# Patient Record
Sex: MALE | Race: WHITE | NOT HISPANIC OR LATINO
[De-identification: names, ages, dates, MRNs, and addresses within clinical notes are randomized per-mention and may not be internally consistent; named-entity substitution may affect disease eponyms.]

---

## 2018-02-16 ENCOUNTER — TRANSCRIPTION ENCOUNTER (OUTPATIENT)
Age: 28
End: 2018-02-16

## 2018-09-28 ENCOUNTER — TRANSCRIPTION ENCOUNTER (OUTPATIENT)
Age: 28
End: 2018-09-28

## 2019-04-01 ENCOUNTER — TRANSCRIPTION ENCOUNTER (OUTPATIENT)
Age: 29
End: 2019-04-01

## 2019-08-11 ENCOUNTER — TRANSCRIPTION ENCOUNTER (OUTPATIENT)
Age: 29
End: 2019-08-11

## 2020-01-12 ENCOUNTER — TRANSCRIPTION ENCOUNTER (OUTPATIENT)
Age: 30
End: 2020-01-12

## 2020-03-08 ENCOUNTER — TRANSCRIPTION ENCOUNTER (OUTPATIENT)
Age: 30
End: 2020-03-08

## 2020-03-25 ENCOUNTER — OUTPATIENT (OUTPATIENT)
Dept: OUTPATIENT SERVICES | Facility: HOSPITAL | Age: 30
LOS: 1 days | End: 2020-03-25
Payer: COMMERCIAL

## 2020-03-25 ENCOUNTER — TRANSCRIPTION ENCOUNTER (OUTPATIENT)
Age: 30
End: 2020-03-25

## 2020-03-25 ENCOUNTER — APPOINTMENT (OUTPATIENT)
Dept: CT IMAGING | Facility: CLINIC | Age: 30
End: 2020-03-25
Payer: COMMERCIAL

## 2020-03-25 DIAGNOSIS — S06.0X1A CONCUSSION WITH LOSS OF CONSCIOUSNESS OF 30 MINUTES OR LESS, INITIAL ENCOUNTER: ICD-10-CM

## 2020-03-25 DIAGNOSIS — S09.90XA UNSPECIFIED INJURY OF HEAD, INITIAL ENCOUNTER: ICD-10-CM

## 2020-03-25 PROCEDURE — 70450 CT HEAD/BRAIN W/O DYE: CPT | Mod: 26

## 2020-03-25 PROCEDURE — 70450 CT HEAD/BRAIN W/O DYE: CPT

## 2020-05-12 ENCOUNTER — TRANSCRIPTION ENCOUNTER (OUTPATIENT)
Age: 30
End: 2020-05-12

## 2020-09-27 ENCOUNTER — TRANSCRIPTION ENCOUNTER (OUTPATIENT)
Age: 30
End: 2020-09-27

## 2021-01-05 ENCOUNTER — INPATIENT (INPATIENT)
Facility: HOSPITAL | Age: 31
LOS: 1 days | Discharge: HOME | DRG: 419 | End: 2021-01-07
Attending: SURGERY | Admitting: SURGERY
Payer: COMMERCIAL

## 2021-01-05 ENCOUNTER — TRANSCRIPTION ENCOUNTER (OUTPATIENT)
Age: 31
End: 2021-01-05

## 2021-01-05 VITALS — WEIGHT: 119.93 LBS | HEIGHT: 70 IN

## 2021-01-05 DIAGNOSIS — K81.0 ACUTE CHOLECYSTITIS: ICD-10-CM

## 2021-01-05 DIAGNOSIS — Z87.891 PERSONAL HISTORY OF NICOTINE DEPENDENCE: ICD-10-CM

## 2021-01-05 DIAGNOSIS — F12.90 CANNABIS USE, UNSPECIFIED, UNCOMPLICATED: ICD-10-CM

## 2021-01-05 LAB
ALBUMIN SERPL ELPH-MCNC: 3.9 G/DL — SIGNIFICANT CHANGE UP (ref 3.3–5)
ALP SERPL-CCNC: 56 U/L — SIGNIFICANT CHANGE UP (ref 40–120)
ALT FLD-CCNC: 44 U/L — SIGNIFICANT CHANGE UP (ref 12–78)
ANION GAP SERPL CALC-SCNC: 4 MMOL/L — LOW (ref 5–17)
AST SERPL-CCNC: 26 U/L — SIGNIFICANT CHANGE UP (ref 15–37)
BASOPHILS # BLD AUTO: 0.02 K/UL — SIGNIFICANT CHANGE UP (ref 0–0.2)
BASOPHILS NFR BLD AUTO: 0.2 % — SIGNIFICANT CHANGE UP (ref 0–2)
BILIRUB SERPL-MCNC: 0.4 MG/DL — SIGNIFICANT CHANGE UP (ref 0.2–1.2)
BUN SERPL-MCNC: 12 MG/DL — SIGNIFICANT CHANGE UP (ref 7–23)
CALCIUM SERPL-MCNC: 8.9 MG/DL — SIGNIFICANT CHANGE UP (ref 8.5–10.1)
CHLORIDE SERPL-SCNC: 104 MMOL/L — SIGNIFICANT CHANGE UP (ref 96–108)
CO2 SERPL-SCNC: 31 MMOL/L — SIGNIFICANT CHANGE UP (ref 22–31)
CREAT SERPL-MCNC: 0.99 MG/DL — SIGNIFICANT CHANGE UP (ref 0.5–1.3)
EOSINOPHIL # BLD AUTO: 0.05 K/UL — SIGNIFICANT CHANGE UP (ref 0–0.5)
EOSINOPHIL NFR BLD AUTO: 0.4 % — SIGNIFICANT CHANGE UP (ref 0–6)
GLUCOSE SERPL-MCNC: 103 MG/DL — HIGH (ref 70–99)
HCT VFR BLD CALC: 41 % — SIGNIFICANT CHANGE UP (ref 39–50)
HGB BLD-MCNC: 13.7 G/DL — SIGNIFICANT CHANGE UP (ref 13–17)
IMM GRANULOCYTES NFR BLD AUTO: 0.2 % — SIGNIFICANT CHANGE UP (ref 0–1.5)
LACTATE SERPL-SCNC: 1.1 MMOL/L — SIGNIFICANT CHANGE UP (ref 0.7–2)
LIDOCAIN IGE QN: 167 U/L — SIGNIFICANT CHANGE UP (ref 73–393)
LYMPHOCYTES # BLD AUTO: 1.89 K/UL — SIGNIFICANT CHANGE UP (ref 1–3.3)
LYMPHOCYTES # BLD AUTO: 15.3 % — SIGNIFICANT CHANGE UP (ref 13–44)
MCHC RBC-ENTMCNC: 28.5 PG — SIGNIFICANT CHANGE UP (ref 27–34)
MCHC RBC-ENTMCNC: 33.4 GM/DL — SIGNIFICANT CHANGE UP (ref 32–36)
MCV RBC AUTO: 85.4 FL — SIGNIFICANT CHANGE UP (ref 80–100)
MONOCYTES # BLD AUTO: 1.1 K/UL — HIGH (ref 0–0.9)
MONOCYTES NFR BLD AUTO: 8.9 % — SIGNIFICANT CHANGE UP (ref 2–14)
NEUTROPHILS # BLD AUTO: 9.3 K/UL — HIGH (ref 1.8–7.4)
NEUTROPHILS NFR BLD AUTO: 75 % — SIGNIFICANT CHANGE UP (ref 43–77)
PLATELET # BLD AUTO: 244 K/UL — SIGNIFICANT CHANGE UP (ref 150–400)
POTASSIUM SERPL-MCNC: 3.7 MMOL/L — SIGNIFICANT CHANGE UP (ref 3.5–5.3)
POTASSIUM SERPL-SCNC: 3.7 MMOL/L — SIGNIFICANT CHANGE UP (ref 3.5–5.3)
PROT SERPL-MCNC: 8.1 GM/DL — SIGNIFICANT CHANGE UP (ref 6–8.3)
RBC # BLD: 4.8 M/UL — SIGNIFICANT CHANGE UP (ref 4.2–5.8)
RBC # FLD: 13 % — SIGNIFICANT CHANGE UP (ref 10.3–14.5)
SODIUM SERPL-SCNC: 139 MMOL/L — SIGNIFICANT CHANGE UP (ref 135–145)
WBC # BLD: 12.38 K/UL — HIGH (ref 3.8–10.5)
WBC # FLD AUTO: 12.38 K/UL — HIGH (ref 3.8–10.5)

## 2021-01-05 PROCEDURE — 74177 CT ABD & PELVIS W/CONTRAST: CPT | Mod: 26

## 2021-01-05 PROCEDURE — 99222 1ST HOSP IP/OBS MODERATE 55: CPT

## 2021-01-05 PROCEDURE — 81003 URINALYSIS AUTO W/O SCOPE: CPT

## 2021-01-05 PROCEDURE — 85610 PROTHROMBIN TIME: CPT

## 2021-01-05 PROCEDURE — 80053 COMPREHEN METABOLIC PANEL: CPT

## 2021-01-05 PROCEDURE — 88304 TISSUE EXAM BY PATHOLOGIST: CPT

## 2021-01-05 PROCEDURE — 84100 ASSAY OF PHOSPHORUS: CPT

## 2021-01-05 PROCEDURE — 76705 ECHO EXAM OF ABDOMEN: CPT | Mod: 26

## 2021-01-05 PROCEDURE — 36415 COLL VENOUS BLD VENIPUNCTURE: CPT

## 2021-01-05 PROCEDURE — C1889: CPT

## 2021-01-05 PROCEDURE — C9113: CPT

## 2021-01-05 PROCEDURE — 85730 THROMBOPLASTIN TIME PARTIAL: CPT

## 2021-01-05 PROCEDURE — 82248 BILIRUBIN DIRECT: CPT

## 2021-01-05 PROCEDURE — 93005 ELECTROCARDIOGRAM TRACING: CPT

## 2021-01-05 PROCEDURE — 83690 ASSAY OF LIPASE: CPT

## 2021-01-05 PROCEDURE — 71045 X-RAY EXAM CHEST 1 VIEW: CPT | Mod: 26

## 2021-01-05 PROCEDURE — 86769 SARS-COV-2 COVID-19 ANTIBODY: CPT

## 2021-01-05 PROCEDURE — 83735 ASSAY OF MAGNESIUM: CPT

## 2021-01-05 PROCEDURE — 78226 HEPATOBILIARY SYSTEM IMAGING: CPT

## 2021-01-05 PROCEDURE — 85027 COMPLETE CBC AUTOMATED: CPT

## 2021-01-05 PROCEDURE — A9537: CPT

## 2021-01-05 RX ORDER — ONDANSETRON 8 MG/1
4 TABLET, FILM COATED ORAL ONCE
Refills: 0 | Status: COMPLETED | OUTPATIENT
Start: 2021-01-05 | End: 2021-01-05

## 2021-01-05 RX ORDER — HEPARIN SODIUM 5000 [USP'U]/ML
5000 INJECTION INTRAVENOUS; SUBCUTANEOUS EVERY 8 HOURS
Refills: 0 | Status: DISCONTINUED | OUTPATIENT
Start: 2021-01-05 | End: 2021-01-07

## 2021-01-05 RX ORDER — MORPHINE SULFATE 50 MG/1
4 CAPSULE, EXTENDED RELEASE ORAL ONCE
Refills: 0 | Status: DISCONTINUED | OUTPATIENT
Start: 2021-01-05 | End: 2021-01-05

## 2021-01-05 RX ORDER — PANTOPRAZOLE SODIUM 20 MG/1
40 TABLET, DELAYED RELEASE ORAL DAILY
Refills: 0 | Status: DISCONTINUED | OUTPATIENT
Start: 2021-01-05 | End: 2021-01-07

## 2021-01-05 RX ORDER — HYDROMORPHONE HYDROCHLORIDE 2 MG/ML
0.5 INJECTION INTRAMUSCULAR; INTRAVENOUS; SUBCUTANEOUS EVERY 4 HOURS
Refills: 0 | Status: DISCONTINUED | OUTPATIENT
Start: 2021-01-05 | End: 2021-01-07

## 2021-01-05 RX ORDER — HYDROMORPHONE HYDROCHLORIDE 2 MG/ML
0.5 INJECTION INTRAMUSCULAR; INTRAVENOUS; SUBCUTANEOUS ONCE
Refills: 0 | Status: DISCONTINUED | OUTPATIENT
Start: 2021-01-05 | End: 2021-01-05

## 2021-01-05 RX ORDER — PANTOPRAZOLE SODIUM 20 MG/1
40 TABLET, DELAYED RELEASE ORAL DAILY
Refills: 0 | Status: DISCONTINUED | OUTPATIENT
Start: 2021-01-05 | End: 2021-01-06

## 2021-01-05 RX ORDER — ACETAMINOPHEN 500 MG
650 TABLET ORAL EVERY 6 HOURS
Refills: 0 | Status: DISCONTINUED | OUTPATIENT
Start: 2021-01-05 | End: 2021-01-07

## 2021-01-05 RX ORDER — SODIUM CHLORIDE 9 MG/ML
1000 INJECTION INTRAMUSCULAR; INTRAVENOUS; SUBCUTANEOUS ONCE
Refills: 0 | Status: COMPLETED | OUTPATIENT
Start: 2021-01-05 | End: 2021-01-05

## 2021-01-05 RX ORDER — SODIUM CHLORIDE 9 MG/ML
1000 INJECTION, SOLUTION INTRAVENOUS
Refills: 0 | Status: DISCONTINUED | OUTPATIENT
Start: 2021-01-05 | End: 2021-01-06

## 2021-01-05 RX ORDER — SODIUM CHLORIDE 9 MG/ML
2000 INJECTION INTRAMUSCULAR; INTRAVENOUS; SUBCUTANEOUS ONCE
Refills: 0 | Status: COMPLETED | OUTPATIENT
Start: 2021-01-05 | End: 2021-01-05

## 2021-01-05 RX ORDER — HYDROMORPHONE HYDROCHLORIDE 2 MG/ML
1 INJECTION INTRAMUSCULAR; INTRAVENOUS; SUBCUTANEOUS EVERY 4 HOURS
Refills: 0 | Status: DISCONTINUED | OUTPATIENT
Start: 2021-01-05 | End: 2021-01-07

## 2021-01-05 RX ORDER — HYDROMORPHONE HYDROCHLORIDE 2 MG/ML
1 INJECTION INTRAMUSCULAR; INTRAVENOUS; SUBCUTANEOUS ONCE
Refills: 0 | Status: DISCONTINUED | OUTPATIENT
Start: 2021-01-05 | End: 2021-01-05

## 2021-01-05 RX ORDER — FAMOTIDINE 10 MG/ML
20 INJECTION INTRAVENOUS ONCE
Refills: 0 | Status: COMPLETED | OUTPATIENT
Start: 2021-01-05 | End: 2021-01-05

## 2021-01-05 RX ADMIN — MORPHINE SULFATE 4 MILLIGRAM(S): 50 CAPSULE, EXTENDED RELEASE ORAL at 20:48

## 2021-01-05 RX ADMIN — MORPHINE SULFATE 4 MILLIGRAM(S): 50 CAPSULE, EXTENDED RELEASE ORAL at 20:18

## 2021-01-05 RX ADMIN — HYDROMORPHONE HYDROCHLORIDE 1 MILLIGRAM(S): 2 INJECTION INTRAMUSCULAR; INTRAVENOUS; SUBCUTANEOUS at 23:17

## 2021-01-05 RX ADMIN — HYDROMORPHONE HYDROCHLORIDE 1 MILLIGRAM(S): 2 INJECTION INTRAMUSCULAR; INTRAVENOUS; SUBCUTANEOUS at 22:47

## 2021-01-05 RX ADMIN — SODIUM CHLORIDE 1000 MILLILITER(S): 9 INJECTION INTRAMUSCULAR; INTRAVENOUS; SUBCUTANEOUS at 23:46

## 2021-01-05 RX ADMIN — MORPHINE SULFATE 4 MILLIGRAM(S): 50 CAPSULE, EXTENDED RELEASE ORAL at 21:37

## 2021-01-05 RX ADMIN — FAMOTIDINE 20 MILLIGRAM(S): 10 INJECTION INTRAVENOUS at 20:18

## 2021-01-05 RX ADMIN — SODIUM CHLORIDE 2000 MILLILITER(S): 9 INJECTION INTRAMUSCULAR; INTRAVENOUS; SUBCUTANEOUS at 20:18

## 2021-01-05 RX ADMIN — SODIUM CHLORIDE 2000 MILLILITER(S): 9 INJECTION INTRAMUSCULAR; INTRAVENOUS; SUBCUTANEOUS at 21:18

## 2021-01-05 RX ADMIN — ONDANSETRON 4 MILLIGRAM(S): 8 TABLET, FILM COATED ORAL at 22:47

## 2021-01-05 RX ADMIN — ONDANSETRON 4 MILLIGRAM(S): 8 TABLET, FILM COATED ORAL at 20:18

## 2021-01-05 RX ADMIN — MORPHINE SULFATE 4 MILLIGRAM(S): 50 CAPSULE, EXTENDED RELEASE ORAL at 22:07

## 2021-01-05 RX ADMIN — SODIUM CHLORIDE 1000 MILLILITER(S): 9 INJECTION INTRAMUSCULAR; INTRAVENOUS; SUBCUTANEOUS at 22:46

## 2021-01-05 NOTE — H&P ADULT - HISTORY OF PRESENT ILLNESS
31yo M w/ hx of esophagitis/ulcer (2019), ADHD and anxiety presents with abdominal pain that started 2 days ago. Patient does not relate the pain to any inciting factor, and reports that it was random and sudden. First time for this much abdominal pain. Patient describes pain as sharp stabbing pain mostly at epigastric area to RUQ. Pain used to be intermittent, but today has been constant and severe, rating 10/10. Had nausea and vomited today. Passing flatus/BM. Patient initially went to urgent care and his GI physician (Dr. Snyder) and was told to come to ED. Denies fever/chills, shortness of breath, chest pain

## 2021-01-05 NOTE — ED STATDOCS - OBJECTIVE STATEMENT
Pertinent HPI/PMH/PSH/FHx/SHx and Review of Systems contained within  HPI:  Patient p/w CC epigastric-abd pain, x3 days, new onset. Pt states he had an ulcer x2 years ago. In August, pt had an endoscopy for poor acid production. Has not taken anything for pain; states this abd pain is unlike anything he had experienced before.   PMH/PSH relevant for: stomach ulcer (2019), esophagitis, ADHD, anxiety   ROS negative for: fever, Chest pain, SOB, Nausea, vomiting, diarrhea, dysuria    SocialHx: former smoker and marijuana user (last 1 month ago)  FamilyHx not otherwise contributory

## 2021-01-05 NOTE — H&P ADULT - NSHPPHYSICALEXAM_GEN_ALL_CORE
PHYSICAL EXAM:  GENERAL: NAD, AOx3, well developed, well nourished  HEAD:  Atraumatic, Normocephalic  EYES: EOMI, PERRLA, conjunctiva and sclera clear  ENT: Moist mucous membranes  NECK: Supple, No JVD  CHEST/LUNG: Clear to auscultation bilaterally; No rales, rhonchi, wheezing, or rubs. Unlabored respirations  HEART: Regular rate and rhythm; No murmurs, rubs, or gallops  ABDOMEN: soft, nondistended, moderate-severe tenderness to palpation at epigastric and RUQ area. no rebound tenderness. no peritoneal sign  EXTREMITIES:  2+ Peripheral Pulses, brisk capillary refill. No clubbing, cyanosis, or edema  NERVOUS SYSTEM:  Alert & Oriented X3, speech clear. No deficits   MSK: full ROM, no deformities  SKIN: warm to touch. No rashes or lesions

## 2021-01-05 NOTE — H&P ADULT - NSHPLABSRESULTS_GEN_ALL_CORE
Complete Blood Count + Automated Diff (01.05.21 @ 19:23)   WBC Count: 12.38 K/uL   RBC Count: 4.80 M/uL   Hemoglobin: 13.7 g/dL   Hematocrit: 41.0 %   Mean Cell Volume: 85.4 fl   Mean Cell Hemoglobin: 28.5 pg   Mean Cell Hemoglobin Conc: 33.4 gm/dL   Red Cell Distrib Width: 13.0 %   Platelet Count - Automated: 244 K/uL Comprehensive Metabolic Panel (01.05.21 @ 19:23)   Sodium, Serum: 139 mmol/L   Potassium, Serum: 3.7 mmol/L   Chloride, Serum: 104 mmol/L   Carbon Dioxide, Serum: 31 mmol/L   Anion Gap, Serum: 4 mmol/L   Blood Urea Nitrogen, Serum: 12 mg/dL   Creatinine, Serum: 0.99 mg/dL   Glucose, Serum: 103 mg/dL   Calcium, Total Serum: 8.9 mg/dL   Protein Total, Serum: 8.1 gm/dL   Albumin, Serum: 3.9 g/dL   Bilirubin Total, Serum: 0.4 mg/dL   Alkaline Phosphatase, Serum: 56 U/L   Aspartate Aminotransferase (AST/SGOT): 26 U/L   Alanine Aminotransferase (ALT/SGPT): 44 U/L   eGFR if Non : 102: Interpretative comment Lactate, Blood (01.05.21 @ 19:23)   Lactate, Blood: 1.1 mmol/L Lipase, Serum (01.05.21 @ 19:23)   Lipase, Serum: 167 U/L     < from: US Abdomen Limited (01.05.21 @ 21:55) >    EXAM:  US ABDOMEN LIMITED                            PROCEDURE DATE:  01/05/2021          INTERPRETATION:  CLINICAL INFORMATION: Abdominal pain and nausea    < end of copied text >    < from: US Abdomen Limited (01.05.21 @ 21:55) >    IMPRESSION:    Distended gallbladder without specific sonographic stigmata of acute cholecystitis. Consider either medicine hepatobiliary exam if clinically warranted.        < end of copied text >    < from: CT Abdomen and Pelvis w/ IV Cont (01.05.21 @ 20:05) >      EXAM:  CT ABDOMEN AND PELVIS IC                            PROCEDURE DATE:  01/05/2021        < end of copied text >    < from: CT Abdomen and Pelvis w/ IV Cont (01.05.21 @ 20:05) >    IMPRESSION:  Distended gallbladder; no CT evidence for gallstone, thickened gallbladder wall or pericholecystic fluid. Gallbladder ultrasound is recommended for further evaluation.    Nonspecific mild periportal edema.    Nonspecific mildly enlarged 1.2 cm left periaortic lymph node.    Small 5 mm subpleural right lower lobe lung nodule; if the patient is in the high risk category (i.e. smoker), follow-up chest CT may be pursued in 12 months to ensure stability.      < end of copied text >

## 2021-01-05 NOTE — H&P ADULT - NSICDXPASTMEDICALHX_GEN_ALL_CORE_FT
PAST MEDICAL HISTORY:  Anxiety     Attention deficit hyperkinetic disorder     H/O esophageal ulcer

## 2021-01-05 NOTE — H&P ADULT - ASSESSMENT
31yo M presents severe abdominal pain, US demonstrated cholelithiasis w/ distended gallbladder, but no signs of acute cholecystitis    Plan:  - pain control prn  - monitor VS  - diet: NPO after midnight  - antiemetic control prn  - HIDA in AM  - continue IVF  - continue IV ABx  - DVT/GI PPX  - encourage IS/OOB    Plan discussed with surgery team and attending, Dr. Aragon

## 2021-01-05 NOTE — ED STATDOCS - PHYSICAL EXAMINATION
*GEN: No acute distress, well appearing   *HEAD: Normocephalic, Atraumatic  *EYES/NOSE: b/l Pupils symmetric & Reactive to ligth, EOMI b/l  *THROAT: airway patent, moist mucous membranes  *NECK: Neck supple  *PULMONARY: No Respiratory distress, symmetric b/l chest rise  *CARDIAC: s1s2, regular rhythm   *ABDOMEN: +significant tenderness to upper abd, Non Distended, soft, no guarding, no rebound, no masses   *BACK: no CVA tenderness, No midline vertebral tenderness to palpation   *EXTREMITIES: symmetric pulses, 2+ DP & radial pulses, no cyanosis, no edema   *SKIN: no rash, no bruising   *NEUROLOGIC: alert,  full active & passive ROM in all 4 extremities,   *PSYCH: appropriate concern about symptoms, pleasant

## 2021-01-05 NOTE — ED STATDOCS - NS ED ROS FT
Review of Systems:  	•	CONSTITUTIONAL: no fever  	•	SKIN: no rash  	•	RESPIRATORY: no shortness of breath  	•	CARDIAC: no chest pain  	•	GI:  +abd pain, no nausea, no vomiting, no diarrhea  	•	GENITO-URINARY:  no dysuria  	•	MUSCULOSKELETAL:  no back pain  	•	NEUROLOGIC: no weakness  	•	ALLERGY: no rhinorrhea  	•	PSYSCHIATRIC: appropriate concern about symptoms

## 2021-01-05 NOTE — ED ADULT TRIAGE NOTE - CHIEF COMPLAINT QUOTE
Patient presents to ED complaining of abdominal pain with associated fever x 3-4 days. Pt also endorses nausea and vomiting.

## 2021-01-05 NOTE — ED STATDOCS - PROGRESS NOTE DETAILS
31 y/o Male with stomach ulcer (2019), esophagitis, ADHD, anxiety presents to ED with c/o epigastric-abd pain, x3 days, new onset. Pt states he had an ulcer x2 years ago. In August, pt had an endoscopy for poor acid production. Has not taken anything for pain; states this abd pain is unlike anything he had experienced before.  Will F?U Labs/ CT/ CXR and re-eval.  Karyna Mathews PA-C Pt reports he is still in pain.  Got relief for short period of time and pain returned.  (+_) Tender epigastrum and RUQ.  CT showed distended gallbladder.  Will get RUQ US.  Give additional pain meds.  No free air evident on CXR.  Lipase,LFTs  WNL.  CBC elevated to 12,000.  Karyna Mathews PA-C Pt reports he is still in pain.  Got relief for short period of time and pain returned.  (+_) Tender epigastrum and RUQ.  CT showed distended gallbladder.  Will get RUQ US.  Give additional pain meds.  No free air evident on CXR.  Lipase, LFTs  WNL.  CBC elevated to 12,000.  Will endorse to [facility] Gomez.  Karyna Mathews PA-C PA: Care received from RUSS Munoz. CT abd/pel suspicious for GB wall thickening, rec sono to r/o acute cholecystitis. SONO done, results pending. Medicated for pain. ~Jamaal Gomez PA-C PA: SONO results show Distended gallbladder without specific sonographic stigmata of acute cholecystitis. ~Jamaal Gomez PA-C   Spoke with surgical resident Dr. Valentin, patient likely needs HIDA. Dr. Valentin will contact Dr. Aragon and get back to me whether to admit or transfer. ~Jamaal Gomez PA-C PA: Patient seen by surg res Dr. Valentin, will admit patient under SERJIO Grimm in am. ~Jamaal Gomez PA-C

## 2021-01-05 NOTE — ED STATDOCS - CLINICAL SUMMARY MEDICAL DECISION MAKING FREE TEXT BOX
Significant upper abd pain, will evaluate for pancreatitis, perforated ulcer, reassess for dispo. Significant upper abd pain, will evaluate for pancreatitis, perforated ulcer, reassess for dispo.      PA: Patient presented with RUQ pain, n/v. CT suspicious for GB wall thickening. SONO results show Distended gallbladder without specific sonographic stigmata of acute cholecystitis. Spoke with surgical resident Dr. Valentin, patient likely needs HIDA. admitted under Dr. Aragon. ~Jamaal Gomez PA-C Significant upper abd pain, will evaluate for pancreatitis, perforated ulcer, cholecystitis reassess for dispo.      PA: Patient presented with RUQ pain, n/v. CT suspicious for GB wall thickening. SONO results show Distended gallbladder without specific sonographic stigmata of acute cholecystitis. Spoke with surgical resident Dr. Valentin, patient likely needs HIDA. admitted under Dr. Aragon. ~Jamaal Gomez PA-C

## 2021-01-05 NOTE — ED STATDOCS - NOTES
Spoke with surgical resident Dr. Valentin, patient likely needs HIDA. Dr. Valentin will contact Dr. Aragon and get back to me whether to admit or transfer. ~Jamaal Gomez PA-C

## 2021-01-05 NOTE — ED STATDOCS - ATTENDING CONTRIBUTION TO CARE
I, Lazaro Kline MD,  performed the initial face to face bedside interview with this patient regarding history of present illness, review of symptoms and relevant past medical, social and family history.  I completed an independent physical examination.  I was the initial provider who evaluated this patient. I have signed out the follow up of any pending tests (i.e. labs, radiological studies) to the ACP.  The ACP will complete the work up, interpret tests, administer medications if necessary and reassess the patient for an appropriate disposition.  If questions arise the ACP is expected to contact me for consultation. In the current work-flow I usually don't get to speak to nor reassess patients for final disposition once I sign the case out to the ACP (Unless otherwise specifically documented by me).

## 2021-01-05 NOTE — ED ADULT NURSE NOTE - NSIMPLEMENTINTERV_GEN_ALL_ED
Implemented All Universal Safety Interventions:  [place] to call system. Call bell, personal items and telephone within reach. Instruct patient to call for assistance. Room bathroom lighting operational. Non-slip footwear when patient is off stretcher. Physically safe environment: no spills, clutter or unnecessary equipment. Stretcher in lowest position, wheels locked, appropriate side rails in place.

## 2021-01-05 NOTE — ED ADULT NURSE NOTE - OBJECTIVE STATEMENT
Patient complains of  epigastric-abd pain, x3 days, new onset. Pt states he had an ulcer x2 years ago. In August, pt had an endoscopy for poor acid production. Has not taken anything for pain; states this abd pain is unlike anything he had experienced before.

## 2021-01-06 ENCOUNTER — RESULT REVIEW (OUTPATIENT)
Age: 31
End: 2021-01-06

## 2021-01-06 DIAGNOSIS — K81.0 ACUTE CHOLECYSTITIS: ICD-10-CM

## 2021-01-06 LAB
ALBUMIN SERPL ELPH-MCNC: 2.7 G/DL — LOW (ref 3.3–5)
ALP SERPL-CCNC: 79 U/L — SIGNIFICANT CHANGE UP (ref 40–120)
ALT FLD-CCNC: 157 U/L — HIGH (ref 12–78)
ANION GAP SERPL CALC-SCNC: 4 MMOL/L — LOW (ref 5–17)
APPEARANCE UR: CLEAR — SIGNIFICANT CHANGE UP
APTT BLD: 29.2 SEC — SIGNIFICANT CHANGE UP (ref 27.5–35.5)
AST SERPL-CCNC: 103 U/L — HIGH (ref 15–37)
BILIRUB SERPL-MCNC: 1.7 MG/DL — HIGH (ref 0.2–1.2)
BILIRUB UR-MCNC: NEGATIVE — SIGNIFICANT CHANGE UP
BUN SERPL-MCNC: 11 MG/DL — SIGNIFICANT CHANGE UP (ref 7–23)
CALCIUM SERPL-MCNC: 8.3 MG/DL — LOW (ref 8.5–10.1)
CHLORIDE SERPL-SCNC: 106 MMOL/L — SIGNIFICANT CHANGE UP (ref 96–108)
CO2 SERPL-SCNC: 27 MMOL/L — SIGNIFICANT CHANGE UP (ref 22–31)
COLOR SPEC: YELLOW — SIGNIFICANT CHANGE UP
CREAT SERPL-MCNC: 0.85 MG/DL — SIGNIFICANT CHANGE UP (ref 0.5–1.3)
DIFF PNL FLD: NEGATIVE — SIGNIFICANT CHANGE UP
GLUCOSE SERPL-MCNC: 102 MG/DL — HIGH (ref 70–99)
GLUCOSE UR QL: NEGATIVE MG/DL — SIGNIFICANT CHANGE UP
HCT VFR BLD CALC: 35.4 % — LOW (ref 39–50)
HGB BLD-MCNC: 12 G/DL — LOW (ref 13–17)
INR BLD: 1.25 RATIO — HIGH (ref 0.88–1.16)
KETONES UR-MCNC: ABNORMAL
LEUKOCYTE ESTERASE UR-ACNC: NEGATIVE — SIGNIFICANT CHANGE UP
LIDOCAIN IGE QN: 60 U/L — LOW (ref 73–393)
MCHC RBC-ENTMCNC: 28.8 PG — SIGNIFICANT CHANGE UP (ref 27–34)
MCHC RBC-ENTMCNC: 33.9 GM/DL — SIGNIFICANT CHANGE UP (ref 32–36)
MCV RBC AUTO: 85.1 FL — SIGNIFICANT CHANGE UP (ref 80–100)
NITRITE UR-MCNC: NEGATIVE — SIGNIFICANT CHANGE UP
PH UR: 8 — SIGNIFICANT CHANGE UP (ref 5–8)
PLATELET # BLD AUTO: 198 K/UL — SIGNIFICANT CHANGE UP (ref 150–400)
POTASSIUM SERPL-MCNC: 4.3 MMOL/L — SIGNIFICANT CHANGE UP (ref 3.5–5.3)
POTASSIUM SERPL-SCNC: 4.3 MMOL/L — SIGNIFICANT CHANGE UP (ref 3.5–5.3)
PROT SERPL-MCNC: 6.6 GM/DL — SIGNIFICANT CHANGE UP (ref 6–8.3)
PROT UR-MCNC: NEGATIVE MG/DL — SIGNIFICANT CHANGE UP
PROTHROM AB SERPL-ACNC: 14.5 SEC — HIGH (ref 10.6–13.6)
RBC # BLD: 4.16 M/UL — LOW (ref 4.2–5.8)
RBC # FLD: 12.9 % — SIGNIFICANT CHANGE UP (ref 10.3–14.5)
SARS-COV-2 RNA SPEC QL NAA+PROBE: SIGNIFICANT CHANGE UP
SODIUM SERPL-SCNC: 137 MMOL/L — SIGNIFICANT CHANGE UP (ref 135–145)
SP GR SPEC: 1.01 — SIGNIFICANT CHANGE UP (ref 1.01–1.02)
UROBILINOGEN FLD QL: NEGATIVE MG/DL — SIGNIFICANT CHANGE UP
WBC # BLD: 10.15 K/UL — SIGNIFICANT CHANGE UP (ref 3.8–10.5)
WBC # FLD AUTO: 10.15 K/UL — SIGNIFICANT CHANGE UP (ref 3.8–10.5)

## 2021-01-06 PROCEDURE — 78226 HEPATOBILIARY SYSTEM IMAGING: CPT | Mod: 26

## 2021-01-06 PROCEDURE — 93010 ELECTROCARDIOGRAM REPORT: CPT

## 2021-01-06 PROCEDURE — 47562 LAPAROSCOPIC CHOLECYSTECTOMY: CPT

## 2021-01-06 PROCEDURE — 88304 TISSUE EXAM BY PATHOLOGIST: CPT | Mod: 26

## 2021-01-06 RX ORDER — MEPERIDINE HYDROCHLORIDE 50 MG/ML
12.5 INJECTION INTRAMUSCULAR; INTRAVENOUS; SUBCUTANEOUS
Refills: 0 | Status: DISCONTINUED | OUTPATIENT
Start: 2021-01-06 | End: 2021-01-06

## 2021-01-06 RX ORDER — ONDANSETRON 8 MG/1
4 TABLET, FILM COATED ORAL ONCE
Refills: 0 | Status: DISCONTINUED | OUTPATIENT
Start: 2021-01-06 | End: 2021-01-06

## 2021-01-06 RX ORDER — KETOROLAC TROMETHAMINE 30 MG/ML
15 SYRINGE (ML) INJECTION EVERY 6 HOURS
Refills: 0 | Status: DISCONTINUED | OUTPATIENT
Start: 2021-01-06 | End: 2021-01-07

## 2021-01-06 RX ORDER — KETOROLAC TROMETHAMINE 30 MG/ML
30 SYRINGE (ML) INJECTION ONCE
Refills: 0 | Status: DISCONTINUED | OUTPATIENT
Start: 2021-01-06 | End: 2021-01-06

## 2021-01-06 RX ORDER — PIPERACILLIN AND TAZOBACTAM 4; .5 G/20ML; G/20ML
3.38 INJECTION, POWDER, LYOPHILIZED, FOR SOLUTION INTRAVENOUS ONCE
Refills: 0 | Status: COMPLETED | OUTPATIENT
Start: 2021-01-06 | End: 2021-01-06

## 2021-01-06 RX ORDER — OXYCODONE HYDROCHLORIDE 5 MG/1
10 TABLET ORAL ONCE
Refills: 0 | Status: DISCONTINUED | OUTPATIENT
Start: 2021-01-06 | End: 2021-01-06

## 2021-01-06 RX ORDER — SODIUM CHLORIDE 9 MG/ML
1000 INJECTION, SOLUTION INTRAVENOUS
Refills: 0 | Status: DISCONTINUED | OUTPATIENT
Start: 2021-01-06 | End: 2021-01-06

## 2021-01-06 RX ORDER — PIPERACILLIN AND TAZOBACTAM 4; .5 G/20ML; G/20ML
3.38 INJECTION, POWDER, LYOPHILIZED, FOR SOLUTION INTRAVENOUS EVERY 8 HOURS
Refills: 0 | Status: DISCONTINUED | OUTPATIENT
Start: 2021-01-06 | End: 2021-01-07

## 2021-01-06 RX ORDER — HYDROMORPHONE HYDROCHLORIDE 2 MG/ML
0.5 INJECTION INTRAMUSCULAR; INTRAVENOUS; SUBCUTANEOUS
Refills: 0 | Status: DISCONTINUED | OUTPATIENT
Start: 2021-01-06 | End: 2021-01-06

## 2021-01-06 RX ORDER — SODIUM CHLORIDE 9 MG/ML
1000 INJECTION INTRAMUSCULAR; INTRAVENOUS; SUBCUTANEOUS
Refills: 0 | Status: DISCONTINUED | OUTPATIENT
Start: 2021-01-06 | End: 2021-01-06

## 2021-01-06 RX ORDER — FENTANYL CITRATE 50 UG/ML
50 INJECTION INTRAVENOUS
Refills: 0 | Status: DISCONTINUED | OUTPATIENT
Start: 2021-01-06 | End: 2021-01-06

## 2021-01-06 RX ORDER — MORPHINE SULFATE 50 MG/1
2 CAPSULE, EXTENDED RELEASE ORAL ONCE
Refills: 0 | Status: DISCONTINUED | OUTPATIENT
Start: 2021-01-06 | End: 2021-01-06

## 2021-01-06 RX ADMIN — SODIUM CHLORIDE 120 MILLILITER(S): 9 INJECTION, SOLUTION INTRAVENOUS at 07:34

## 2021-01-06 RX ADMIN — HEPARIN SODIUM 5000 UNIT(S): 5000 INJECTION INTRAVENOUS; SUBCUTANEOUS at 05:19

## 2021-01-06 RX ADMIN — FENTANYL CITRATE 50 MICROGRAM(S): 50 INJECTION INTRAVENOUS at 21:45

## 2021-01-06 RX ADMIN — HYDROMORPHONE HYDROCHLORIDE 0.5 MILLIGRAM(S): 2 INJECTION INTRAMUSCULAR; INTRAVENOUS; SUBCUTANEOUS at 20:30

## 2021-01-06 RX ADMIN — HEPARIN SODIUM 5000 UNIT(S): 5000 INJECTION INTRAVENOUS; SUBCUTANEOUS at 22:40

## 2021-01-06 RX ADMIN — Medication 30 MILLIGRAM(S): at 03:08

## 2021-01-06 RX ADMIN — PIPERACILLIN AND TAZOBACTAM 25 GRAM(S): 4; .5 INJECTION, POWDER, LYOPHILIZED, FOR SOLUTION INTRAVENOUS at 22:28

## 2021-01-06 RX ADMIN — Medication 650 MILLIGRAM(S): at 12:53

## 2021-01-06 RX ADMIN — HYDROMORPHONE HYDROCHLORIDE 1 MILLIGRAM(S): 2 INJECTION INTRAMUSCULAR; INTRAVENOUS; SUBCUTANEOUS at 07:22

## 2021-01-06 RX ADMIN — HYDROMORPHONE HYDROCHLORIDE 1 MILLIGRAM(S): 2 INJECTION INTRAMUSCULAR; INTRAVENOUS; SUBCUTANEOUS at 14:07

## 2021-01-06 RX ADMIN — SODIUM CHLORIDE 75 MILLILITER(S): 9 INJECTION, SOLUTION INTRAVENOUS at 20:47

## 2021-01-06 RX ADMIN — PIPERACILLIN AND TAZOBACTAM 200 GRAM(S): 4; .5 INJECTION, POWDER, LYOPHILIZED, FOR SOLUTION INTRAVENOUS at 13:03

## 2021-01-06 RX ADMIN — HYDROMORPHONE HYDROCHLORIDE 0.5 MILLIGRAM(S): 2 INJECTION INTRAMUSCULAR; INTRAVENOUS; SUBCUTANEOUS at 00:48

## 2021-01-06 RX ADMIN — HYDROMORPHONE HYDROCHLORIDE 0.5 MILLIGRAM(S): 2 INJECTION INTRAMUSCULAR; INTRAVENOUS; SUBCUTANEOUS at 21:00

## 2021-01-06 RX ADMIN — FENTANYL CITRATE 50 MICROGRAM(S): 50 INJECTION INTRAVENOUS at 21:30

## 2021-01-06 RX ADMIN — Medication 30 MILLIGRAM(S): at 02:51

## 2021-01-06 RX ADMIN — FENTANYL CITRATE 50 MICROGRAM(S): 50 INJECTION INTRAVENOUS at 20:20

## 2021-01-06 RX ADMIN — SODIUM CHLORIDE 120 MILLILITER(S): 9 INJECTION INTRAMUSCULAR; INTRAVENOUS; SUBCUTANEOUS at 13:11

## 2021-01-06 RX ADMIN — MORPHINE SULFATE 2 MILLIGRAM(S): 50 CAPSULE, EXTENDED RELEASE ORAL at 11:15

## 2021-01-06 RX ADMIN — HEPARIN SODIUM 5000 UNIT(S): 5000 INJECTION INTRAVENOUS; SUBCUTANEOUS at 14:53

## 2021-01-06 RX ADMIN — OXYCODONE HYDROCHLORIDE 10 MILLIGRAM(S): 5 TABLET ORAL at 20:45

## 2021-01-06 RX ADMIN — HYDROMORPHONE HYDROCHLORIDE 0.5 MILLIGRAM(S): 2 INJECTION INTRAMUSCULAR; INTRAVENOUS; SUBCUTANEOUS at 20:00

## 2021-01-06 RX ADMIN — HYDROMORPHONE HYDROCHLORIDE 0.5 MILLIGRAM(S): 2 INJECTION INTRAMUSCULAR; INTRAVENOUS; SUBCUTANEOUS at 20:15

## 2021-01-06 RX ADMIN — FENTANYL CITRATE 50 MICROGRAM(S): 50 INJECTION INTRAVENOUS at 20:25

## 2021-01-06 RX ADMIN — Medication 15 MILLIGRAM(S): at 21:10

## 2021-01-06 RX ADMIN — SODIUM CHLORIDE 120 MILLILITER(S): 9 INJECTION, SOLUTION INTRAVENOUS at 02:16

## 2021-01-06 RX ADMIN — PANTOPRAZOLE SODIUM 40 MILLIGRAM(S): 20 TABLET, DELAYED RELEASE ORAL at 07:32

## 2021-01-06 RX ADMIN — Medication 15 MILLIGRAM(S): at 14:51

## 2021-01-06 NOTE — PROGRESS NOTE ADULT - ATTENDING COMMENTS
30 Y old male with acute coleocystitis, HIDA positive, febrile, now becoming tachycardiac  NPO  IV antibiotics  IV hydration   DVt prophylaxis  Added on for lap cholecystectomy possible open  D/W pt and Nursing staff. 30 Y old male with acute coleocystitis, HIDA positive, febrile, now becoming tachycardiac  NPO  IV antibiotics  IV hydration   DVt prophylaxis  Added on for lap cholecystectomy possible open  D/W pt and Nursing staff.  Pt explained the surgical procedures in both medical terminology and in lay terms that the patient understood, laparascopic possible open cholecystectomy. Pt explained in both medical terminology and in lay terms that the patient understood, the benefits and alternatives of surgery including non-operative management. Pt explained at length in both medical terminology and in lay terms that the patient understood, the associated risks of surgery including but not limited to infection, bleeding, nerve/organ injury, postoperative pain, poor wound healing, scar formation both internally and externally, risk of seroma/hematoma/abcess/biloma formation, risk of hernia development, risk of injury to liver and/or biliary tree/ductal system, risk of need for further GI/IR/Surgery intervention as required. Pt explained in both medical terminology and in lay terms that the patient understood, the associated pradeep and post operative risks of cardiac/cns/respiratory insult or injury, risk of death. Pt understood all of the above. All questions were answered. Pt gave informed consent for surgery.

## 2021-01-06 NOTE — PROVIDER CONTACT NOTE (MEDICATION) - ACTION/TREATMENT ORDERED:
Toradol 15mg IVP q6hrs PRN pain ordered. Please change IVF's to NS rate 120ml/hr per [facility]- Surgery.

## 2021-01-06 NOTE — CONSULT NOTE ADULT - ASSESSMENT
Patient was seen this morning  Epigastric and right upper quadrant pain.  Positive HIDA scan  Likely gallbladder disease.  Appropriate to consider patient for surgical evaluation for cholecystectomy.

## 2021-01-06 NOTE — PRE-OP CHECKLIST - SELECT TESTS ORDERED
CBC/CMP/PT/PTT/INR/Type and Screen/EKG/CXR/COVID CBC/CMP/PT/PTT/INR/Type and Screen/Urinalysis/EKG/CXR/COVID

## 2021-01-06 NOTE — BRIEF OPERATIVE NOTE - NSICDXBRIEFPROCEDURE_GEN_ALL_CORE_FT
PROCEDURES:  Laparoscopic cholecystectomy in patient older than 10 years of age 06-Jan-2021 20:08:42  CUAUHTEMOC CARRANZA

## 2021-01-06 NOTE — CONSULT NOTE ADULT - SUBJECTIVE AND OBJECTIVE BOX
Patient is a 30y old  Male who presents with a chief complaint of abdominal pain (06 Jan 2021 07:56)      HPI:  29yo M w/ hx of esophagitis/ulcer (2019), ADHD and anxiety presents with abdominal pain that started 2 days ago. Patient does not relate the pain to any inciting factor, and reports that it was random and sudden. First time for this much abdominal pain. Patient describes pain as sharp stabbing pain mostly at epigastric area to RUQ. Pain used to be intermittent, but today has been constant and severe, rating 10/10. Had nausea and vomited today. Passing flatus/BM. Patient initially went to urgent care and his GI physician (Dr. Snyder) and was told to come to ED. Denies fever/chills, shortness of breath, chest pain (05 Jan 2021 23:49)      Patient states that he had been feeling well up until around this weekend and then started having upper abdominal pain, predominantly right upper quadrant epigastric region that was very sharp and knifelike for him.  It did not change with eating but he did have nauseousness with it.  Denies any anti-inflammatories.  Does relate a history of gastric ulcer in the remote past.  Had seen his primary gastroenterologist and sent to the hospital    PAST MEDICAL & SURGICAL HISTORY:  Anxiety    Attention deficit hyperkinetic disorder    H/O esophageal ulcer    No significant past surgical history        MEDICATIONS  (STANDING):  heparin   Injectable 5000 Unit(s) SubCutaneous every 8 hours  pantoprazole  Injectable 40 milliGRAM(s) IV Push daily  piperacillin/tazobactam IVPB.. 3.375 Gram(s) IV Intermittent every 8 hours  sodium chloride 0.9%. 1000 milliLiter(s) (120 mL/Hr) IV Continuous <Continuous>    MEDICATIONS  (PRN):  acetaminophen   Tablet .. 650 milliGRAM(s) Oral every 6 hours PRN Temp greater or equal to 38C (100.4F), Mild Pain (1 - 3)  HYDROmorphone  Injectable 1 milliGRAM(s) IV Push every 4 hours PRN Severe Pain (7 - 10)  HYDROmorphone  Injectable 0.5 milliGRAM(s) IV Push every 4 hours PRN Moderate Pain (4 - 6)  ketorolac   Injectable 15 milliGRAM(s) IV Push every 6 hours PRN Mild Pain (1 - 3)      Allergies    Prozac (Vomiting)  Red wine allergy (Vomiting)    Intolerances        SOCIAL HISTORY:neg drugs    FAMILY HISTORY:NC      REVIEW OF SYSTEMS:    CONSTITUTIONAL: No weakness, fevers or chills  EYES/ENT: No visual changes;  No vertigo or throat pain   NECK: No pain or stiffness  RESPIRATORY: No cough, wheezing, hemoptysis; No shortness of breath  CARDIOVASCULAR: No chest pain or palpitations  GENITOURINARY: No dysuria, frequency or hematuria  NEUROLOGICAL: No numbness or weakness  SKIN: No itching, burning, rashes, or lesions   All other review of systems is negative unless indicated above.    Vital Signs Last 24 Hrs  T(C): 37.8 (06 Jan 2021 13:53), Max: 39.3 (06 Jan 2021 12:44)  T(F): 100.1 (06 Jan 2021 13:53), Max: 102.8 (06 Jan 2021 12:44)  HR: 76 (06 Jan 2021 16:07) (76 - 105)  BP: 112/58 (06 Jan 2021 16:07) (112/58 - 146/81)  BP(mean): 95 (06 Jan 2021 12:44) (94 - 104)  RR: 20 (06 Jan 2021 16:07) (18 - 28)  SpO2: 99% (06 Jan 2021 16:07) (97% - 100%)    PHYSICAL EXAM:    Constitutional: NAD, well-developed  HEENT: EOMI, throat clear  Neck: No LAD, supple  Respiratory: CTA and P  Cardiovascular: S1 and S2, RRR, no M  Gastrointestinal: BS+, soft, RUQ tend/ND, neg HSM,  Extremities: No peripheral edema, neg clubing, cyanosis  Vascular: 2+ peripheral pulses  Neurological: A/O x 3, no focal deficits  Psychiatric: Normal mood, normal affect  Skin: No rashes    LABS:  CBC Full  -  ( 06 Jan 2021 08:01 )  WBC Count : 10.15 K/uL  RBC Count : 4.16 M/uL  Hemoglobin : 12.0 g/dL  Hematocrit : 35.4 %  Platelet Count - Automated : 198 K/uL  Mean Cell Volume : 85.1 fl  Mean Cell Hemoglobin : 28.8 pg  Mean Cell Hemoglobin Concentration : 33.9 gm/dL  Auto Neutrophil # : x  Auto Lymphocyte # : x  Auto Monocyte # : x  Auto Eosinophil # : x  Auto Basophil # : x  Auto Neutrophil % : x  Auto Lymphocyte % : x  Auto Monocyte % : x  Auto Eosinophil % : x  Auto Basophil % : x    01-06    137  |  106  |  11  ----------------------------<  102<H>  4.3   |  27  |  0.85    Ca    8.3<L>      06 Jan 2021 08:01    TPro  6.6  /  Alb  2.7<L>  /  TBili  1.7<H>  /  DBili  x   /  AST  103<H>  /  ALT  157<H>  /  AlkPhos  79  01-06    PT/INR - ( 06 Jan 2021 08:01 )   PT: 14.5 sec;   INR: 1.25 ratio         PTT - ( 06 Jan 2021 08:01 )  PTT:29.2 sec        RADIOLOGY & ADDITIONAL STUDIES:  < from: NM Hepatobiliary Imaging (01.06.21 @ 12:32) >  EXAM:  NM HEPATOBILIARY IMG                            PROCEDURE DATE:  01/06/2021          INTERPRETATION:  CLINICAL INFORMATION: 30 year-old male with abdominal pain, nausea and distended gallbladder, referred to evaluate for acute cholecystitis.    RADIOPHARMACEUTICAL: 3.2 mCi and 3.0 mCi Tc-99m-Mebrofenin, I.V.; 2 doses    TECHNIQUE: Dynamic imaging of the anterior abdomen was performed for 2 hours following injection of radiotracer. Morphine 2 mg I.V. and a second dose of radiotracer were administered at 1 hour.  Dynamic imaging was continued for 1 hour followed by static images of the abdomen in the anterior, right lateral and right anterior oblique projections.    COMPARISON: No previous hepatobiliary scan for comparison    FINDINGS:There is prompt, homogeneous uptake of radiotracer by the hepatocytes. Activity is first seen in the bowel at 55 minutes. The gallbladder is not visualized at anytime during the study despite administration of Morphine. There is good clearance of activity from the liver at the end of the study.    IMPRESSION:  Abnormal morphine-augmented hepatobiliary scan compatible with acute cholecystitis.      Dr. Aragon was notified of these results by Janny via telephone on 1/6/2020 at 1:15 PM.              VU TRAVIS MD; Attending Nuclear Medicine  This document has been electronically signed. Jan 6 2021  1:23PM    < end of copied text >

## 2021-01-07 ENCOUNTER — TRANSCRIPTION ENCOUNTER (OUTPATIENT)
Age: 31
End: 2021-01-07

## 2021-01-07 VITALS
OXYGEN SATURATION: 100 % | TEMPERATURE: 98 F | DIASTOLIC BLOOD PRESSURE: 84 MMHG | HEART RATE: 93 BPM | SYSTOLIC BLOOD PRESSURE: 132 MMHG | RESPIRATION RATE: 20 BRPM

## 2021-01-07 LAB
ALBUMIN SERPL ELPH-MCNC: 2.7 G/DL — LOW (ref 3.3–5)
ALP SERPL-CCNC: 75 U/L — SIGNIFICANT CHANGE UP (ref 40–120)
ALT FLD-CCNC: 127 U/L — HIGH (ref 12–78)
ANION GAP SERPL CALC-SCNC: 4 MMOL/L — LOW (ref 5–17)
AST SERPL-CCNC: 53 U/L — HIGH (ref 15–37)
BILIRUB DIRECT SERPL-MCNC: 0.2 MG/DL — SIGNIFICANT CHANGE UP (ref 0–0.2)
BILIRUB SERPL-MCNC: 0.5 MG/DL — SIGNIFICANT CHANGE UP (ref 0.2–1.2)
BUN SERPL-MCNC: 10 MG/DL — SIGNIFICANT CHANGE UP (ref 7–23)
CALCIUM SERPL-MCNC: 8.6 MG/DL — SIGNIFICANT CHANGE UP (ref 8.5–10.1)
CHLORIDE SERPL-SCNC: 105 MMOL/L — SIGNIFICANT CHANGE UP (ref 96–108)
CO2 SERPL-SCNC: 29 MMOL/L — SIGNIFICANT CHANGE UP (ref 22–31)
CREAT SERPL-MCNC: 0.92 MG/DL — SIGNIFICANT CHANGE UP (ref 0.5–1.3)
GLUCOSE SERPL-MCNC: 127 MG/DL — HIGH (ref 70–99)
HCT VFR BLD CALC: 34.9 % — LOW (ref 39–50)
HGB BLD-MCNC: 11.5 G/DL — LOW (ref 13–17)
MAGNESIUM SERPL-MCNC: 2.1 MG/DL — SIGNIFICANT CHANGE UP (ref 1.6–2.6)
MCHC RBC-ENTMCNC: 28.5 PG — SIGNIFICANT CHANGE UP (ref 27–34)
MCHC RBC-ENTMCNC: 33 GM/DL — SIGNIFICANT CHANGE UP (ref 32–36)
MCV RBC AUTO: 86.4 FL — SIGNIFICANT CHANGE UP (ref 80–100)
PHOSPHATE SERPL-MCNC: 2.7 MG/DL — SIGNIFICANT CHANGE UP (ref 2.5–4.5)
PLATELET # BLD AUTO: 209 K/UL — SIGNIFICANT CHANGE UP (ref 150–400)
POTASSIUM SERPL-MCNC: 4.2 MMOL/L — SIGNIFICANT CHANGE UP (ref 3.5–5.3)
POTASSIUM SERPL-SCNC: 4.2 MMOL/L — SIGNIFICANT CHANGE UP (ref 3.5–5.3)
PROT SERPL-MCNC: 6.7 GM/DL — SIGNIFICANT CHANGE UP (ref 6–8.3)
RBC # BLD: 4.04 M/UL — LOW (ref 4.2–5.8)
RBC # FLD: 12.7 % — SIGNIFICANT CHANGE UP (ref 10.3–14.5)
SODIUM SERPL-SCNC: 138 MMOL/L — SIGNIFICANT CHANGE UP (ref 135–145)
WBC # BLD: 7.14 K/UL — SIGNIFICANT CHANGE UP (ref 3.8–10.5)
WBC # FLD AUTO: 7.14 K/UL — SIGNIFICANT CHANGE UP (ref 3.8–10.5)

## 2021-01-07 RX ORDER — OXYCODONE AND ACETAMINOPHEN 5; 325 MG/1; MG/1
1 TABLET ORAL EVERY 6 HOURS
Refills: 0 | Status: DISCONTINUED | OUTPATIENT
Start: 2021-01-07 | End: 2021-01-07

## 2021-01-07 RX ORDER — OXYCODONE HYDROCHLORIDE 5 MG/1
1 TABLET ORAL
Qty: 20 | Refills: 0
Start: 2021-01-07 | End: 2021-01-11

## 2021-01-07 RX ORDER — OXYCODONE HYDROCHLORIDE 5 MG/1
5 TABLET ORAL EVERY 6 HOURS
Refills: 0 | Status: DISCONTINUED | OUTPATIENT
Start: 2021-01-07 | End: 2021-01-07

## 2021-01-07 RX ADMIN — HEPARIN SODIUM 5000 UNIT(S): 5000 INJECTION INTRAVENOUS; SUBCUTANEOUS at 06:50

## 2021-01-07 RX ADMIN — OXYCODONE HYDROCHLORIDE 5 MILLIGRAM(S): 5 TABLET ORAL at 08:53

## 2021-01-07 RX ADMIN — HYDROMORPHONE HYDROCHLORIDE 1 MILLIGRAM(S): 2 INJECTION INTRAMUSCULAR; INTRAVENOUS; SUBCUTANEOUS at 01:40

## 2021-01-07 RX ADMIN — Medication 650 MILLIGRAM(S): at 00:23

## 2021-01-07 RX ADMIN — Medication 15 MILLIGRAM(S): at 06:51

## 2021-01-07 RX ADMIN — PIPERACILLIN AND TAZOBACTAM 25 GRAM(S): 4; .5 INJECTION, POWDER, LYOPHILIZED, FOR SOLUTION INTRAVENOUS at 06:46

## 2021-01-07 RX ADMIN — PANTOPRAZOLE SODIUM 40 MILLIGRAM(S): 20 TABLET, DELAYED RELEASE ORAL at 08:53

## 2021-01-07 NOTE — PROGRESS NOTE ADULT - ASSESSMENT
29yo M presents severe abdominal pain, US demonstrated cholelithiasis w/ distended gallbladder, but no signs of acute cholecystitis s/p laparoscopic cholecystectomy POD 1    Plan:  - pain control prn  - monitor VS  - diet: CLD, ADTA   - f/u am labs   - antiemetic control prn  - continue IVF  - continue IV ABx  - DVT/GI PPX  - encourage IS/OOB    Plan discussed with surgery team and attending, Dr. Aragon 
31yo M presents severe abdominal pain, US demonstrated cholelithiasis w/ distended gallbladder, but no signs of acute cholecystitis    Plan:  - pain control prn  - monitor VS  - diet: NPO  - antiemetic control prn  - HIDA in AM  - possible OR for laparoscopic cholecystectomy based on HIDA result  - continue IVF  - continue IV ABx  - DVT/GI PPX  - encourage IS/OOB    Plan discussed with surgery team and attending, Dr. Martinez
SP CCX and advance ditand consider DC      DW Dr Snyder

## 2021-01-07 NOTE — DISCHARGE NOTE PROVIDER - NSDCCPCAREPLAN_GEN_ALL_CORE_FT
PRINCIPAL DISCHARGE DIAGNOSIS  Diagnosis: Cholecystitis, acute  Assessment and Plan of Treatment:

## 2021-01-07 NOTE — DISCHARGE NOTE NURSING/CASE MANAGEMENT/SOCIAL WORK - PATIENT PORTAL LINK FT
You can access the FollowMyHealth Patient Portal offered by [facility] by registering at the following website: [email or web address]. By joining [facility]’s FollowMyHealth portal, you will also be able to view your health information using other applications (apps) compatible with our system.

## 2021-01-07 NOTE — DISCHARGE NOTE PROVIDER - NSDCMRMEDTOKEN_GEN_ALL_CORE_FT
Adderall 15 mg oral tablet: 15 milligram(s) orally once a day  KlonoPIN 1 mg oral tablet: 1 milligram(s) orally , As Needed   Adderall 15 mg oral tablet: 15 milligram(s) orally once a day  KlonoPIN 1 mg oral tablet: 1 milligram(s) orally , As Needed  oxyCODONE 5 mg oral tablet: 1 tab(s) orally every 6 hours, As Needed -for moderate pain MDD:4 tabs

## 2021-01-07 NOTE — PROGRESS NOTE ADULT - SUBJECTIVE AND OBJECTIVE BOX
CC:Patient is a 30y old  Male who presents with a chief complaint of abdominal pain (06 Jan 2021 18:22)      Subjective:  Pt seen and examined at bedside with chaperone. Pt is AAOx3, pt in no acute distress. Patient reports TTP around the incisions. Patient denies fever, chills, chest pain, SOB, abd pain, N/V/D, extremity pain or dysfunction, hemoptysis, hematemesis, hematuria, hematochexia, headache, diplopia, vertigo, dizzyness.     ROS:      Vital Signs Last 24 Hrs  T(C): 36.6 (07 Jan 2021 06:22), Max: 39.3 (06 Jan 2021 12:44)  T(F): 97.8 (07 Jan 2021 06:22), Max: 102.8 (06 Jan 2021 12:44)  HR: 84 (07 Jan 2021 06:22) (68 - 107)  BP: 127/81 (07 Jan 2021 06:22) (112/58 - 150/83)  BP(mean): 95 (06 Jan 2021 12:44) (95 - 95)  RR: 20 (07 Jan 2021 06:22) (12 - 20)  SpO2: 99% (07 Jan 2021 06:22) (95% - 100%)    Labs:                                12.0   10.15 )-----------( 198      ( 06 Jan 2021 08:01 )             35.4     CBC Full  -  ( 06 Jan 2021 08:01 )  WBC Count : 10.15 K/uL  RBC Count : 4.16 M/uL  Hemoglobin : 12.0 g/dL  Hematocrit : 35.4 %  Platelet Count - Automated : 198 K/uL  Mean Cell Volume : 85.1 fl  Mean Cell Hemoglobin : 28.8 pg  Mean Cell Hemoglobin Concentration : 33.9 gm/dL  Auto Neutrophil # : x  Auto Lymphocyte # : x  Auto Monocyte # : x  Auto Eosinophil # : x  Auto Basophil # : x  Auto Neutrophil % : x  Auto Lymphocyte % : x  Auto Monocyte % : x  Auto Eosinophil % : x  Auto Basophil % : x    01-06    137  |  106  |  11  ----------------------------<  102<H>  4.3   |  27  |  0.85    Ca    8.3<L>      06 Jan 2021 08:01    TPro  6.6  /  Alb  2.7<L>  /  TBili  1.7<H>  /  DBili  x   /  AST  103<H>  /  ALT  157<H>  /  AlkPhos  79  01-06    LIVER FUNCTIONS - ( 06 Jan 2021 08:01 )  Alb: 2.7 g/dL / Pro: 6.6 gm/dL / ALK PHOS: 79 U/L / ALT: 157 U/L / AST: 103 U/L / GGT: x           PT/INR - ( 06 Jan 2021 08:01 )   PT: 14.5 sec;   INR: 1.25 ratio         PTT - ( 06 Jan 2021 08:01 )  PTT:29.2 sec      Meds:  acetaminophen   Tablet .. 650 milliGRAM(s) Oral every 6 hours PRN  heparin   Injectable 5000 Unit(s) SubCutaneous every 8 hours  HYDROmorphone  Injectable 1 milliGRAM(s) IV Push every 4 hours PRN  HYDROmorphone  Injectable 0.5 milliGRAM(s) IV Push every 4 hours PRN  ketorolac   Injectable 15 milliGRAM(s) IV Push every 6 hours PRN  pantoprazole  Injectable 40 milliGRAM(s) IV Push daily  piperacillin/tazobactam IVPB.. 3.375 Gram(s) IV Intermittent every 8 hours      Radiology:      Physical exam:  Pt is aaox3  Pt in no acute distress  Psych: normal affect  Resp: CTAB  CVS: S1S2(+)  ABD: bowel sounds (+), soft, non distended, no rebound, no guarding, no rigidity, incisions c/d/i  EXT: no calf tenderness or edema to exam b/l, on VTE prophylaxis  Skin: no adverse skin changes to exam      
SURGERY DAILY PROGRESS NOTE:     Subjective:  Patient seen and examined this AM at bedside. Patient's pain has been consistently severe overnight at epigastric and RUQ area. No evidence of vomiting. Denies fever/chills, shortness of breath, chest pain. VS reviewed    Objective:    MEDICATIONS  (STANDING):  heparin   Injectable 5000 Unit(s) SubCutaneous every 8 hours  lactated ringers. 1000 milliLiter(s) (120 mL/Hr) IV Continuous <Continuous>  pantoprazole  Injectable 40 milliGRAM(s) IV Push daily    MEDICATIONS  (PRN):  acetaminophen   Tablet .. 650 milliGRAM(s) Oral every 6 hours PRN Temp greater or equal to 38C (100.4F), Mild Pain (1 - 3)  HYDROmorphone  Injectable 1 milliGRAM(s) IV Push every 4 hours PRN Severe Pain (7 - 10)  HYDROmorphone  Injectable 0.5 milliGRAM(s) IV Push every 4 hours PRN Moderate Pain (4 - 6)      Vital Signs Last 24 Hrs  T(C): 37.8 (06 Jan 2021 07:51), Max: 37.8 (06 Jan 2021 07:51)  T(F): 100.1 (06 Jan 2021 07:51), Max: 100.1 (06 Jan 2021 07:51)  HR: 104 (06 Jan 2021 07:51) (92 - 104)  BP: 126/72 (06 Jan 2021 07:51) (126/72 - 146/81)  BP(mean): 104 (06 Jan 2021 07:51) (94 - 104)  RR: 24 (06 Jan 2021 07:51) (18 - 28)  SpO2: 97% (06 Jan 2021 07:51) (97% - 100%)      PHYSICAL EXAM   GENERAL: NAD, AOx3, well developed, well nourished  HEAD:  Atraumatic, Normocephalic  EYES: EOMI, PERRLA, conjunctiva and sclera clear  ENT: Moist mucous membranes  NECK: Supple, No JVD  CHEST/LUNG: Clear to auscultation bilaterally; No rales, rhonchi, wheezing, or rubs. Unlabored respirations  HEART: Regular rate and rhythm; No murmurs, rubs, or gallops  ABDOMEN: soft, nondistended, moderate-severe tenderness to palpation at epigastric and RUQ area. no rebound tenderness. no peritoneal sign  EXTREMITIES:  2+ Peripheral Pulses, brisk capillary refill. No clubbing, cyanosis, or edema  NERVOUS SYSTEM:  Alert & Oriented X3, speech clear. No deficits   MSK: full ROM, no deformities  SKIN: warm to touch. No rashes or lesions      I&O's Detail    05 Jan 2021 07:01  -  06 Jan 2021 07:00  --------------------------------------------------------  IN:    Lactated Ringers: 600 mL  Total IN: 600 mL    OUT:    Voided (mL): 600 mL  Total OUT: 600 mL    Total NET: 0 mL      06 Jan 2021 07:01  -  06 Jan 2021 07:56  --------------------------------------------------------  IN:    Lactated Ringers: 120 mL  Total IN: 120 mL    OUT:  Total OUT: 0 mL    Total NET: 120 mL          Daily Height in cm: 177.8 (05 Jan 2021 18:23)    Daily     LABS:                        13.7   12.38 )-----------( 244      ( 05 Jan 2021 19:23 )             41.0     01-05    139  |  104  |  12  ----------------------------<  103<H>  3.7   |  31  |  0.99    Ca    8.9      05 Jan 2021 19:23    TPro  8.1  /  Alb  3.9  /  TBili  0.4  /  DBili  x   /  AST  26  /  ALT  44  /  AlkPhos  56  01-05          RADIOLOGY & ADDITIONAL STUDIES:        
Patient is a 30y old  Male who presents with a chief complaint of abdominal pain (07 Jan 2021 09:00)      HPI:  31yo M w/ hx of esophagitis/ulcer (2019), ADHD and anxiety presents with abdominal pain that started 2 days ago. Patient does not relate the pain to any inciting factor, and reports that it was random and sudden. First time for this much abdominal pain. Patient describes pain as sharp stabbing pain mostly at epigastric area to RUQ. Pain used to be intermittent, but today has been constant and severe, rating 10/10. Had nausea and vomited today. Passing flatus/BM. Patient initially went to urgent care and his GI physician (Dr. Snyder) and was told to come to ED. Denies fever/chills, shortness of breath, chest pain (05 Jan 2021 23:49)    Status post cholecystectomy.  Has some mild incisional pain but better than before      PAST MEDICAL & SURGICAL HISTORY:  Anxiety    Attention deficit hyperkinetic disorder    H/O esophageal ulcer    No significant past surgical history        MEDICATIONS  (STANDING):  heparin   Injectable 5000 Unit(s) SubCutaneous every 8 hours  pantoprazole  Injectable 40 milliGRAM(s) IV Push daily  piperacillin/tazobactam IVPB.. 3.375 Gram(s) IV Intermittent every 8 hours    MEDICATIONS  (PRN):  acetaminophen   Tablet .. 650 milliGRAM(s) Oral every 6 hours PRN Temp greater or equal to 38C (100.4F), Mild Pain (1 - 3)  ketorolac   Injectable 15 milliGRAM(s) IV Push every 6 hours PRN Mild Pain (1 - 3)  oxyCODONE    IR 5 milliGRAM(s) Oral every 6 hours PRN Severe Pain (7 - 10)      Allergies    Prozac (Vomiting)  Red wine allergy (Vomiting)    Intolerances        SOCIAL HISTORY:NC    FAMILY HISTORY:NC      REVIEW OF SYSTEMS:    CONSTITUTIONAL: No weakness, fevers or chills  EYES/ENT: No visual changes;  No vertigo or throat pain   NECK: No pain or stiffness  RESPIRATORY: No cough, wheezing, hemoptysis; No shortness of breath  CARDIOVASCULAR: No chest pain or palpitations  GENITOURINARY: No dysuria, frequency or hematuria  NEUROLOGICAL: No numbness or weakness  SKIN: No itching, burning, rashes, or lesions   All other review of systems is negative unless indicated above.    Vital Signs Last 24 Hrs  T(C): 36.7 (07 Jan 2021 12:34), Max: 37.2 (06 Jan 2021 19:55)  T(F): 98 (07 Jan 2021 12:34), Max: 98.9 (06 Jan 2021 19:55)  HR: 93 (07 Jan 2021 12:34) (68 - 107)  BP: 132/84 (07 Jan 2021 12:34) (126/67 - 150/83)  BP(mean): --  RR: 20 (07 Jan 2021 12:34) (12 - 20)  SpO2: 100% (07 Jan 2021 12:34) (95% - 100%)    PHYSICAL EXAM:    Constitutional: NAD, well-developed  HEENT: EOMI, throat clear  Neck: No LAD, supple  Respiratory: CTA and P  Cardiovascular: S1 and S2, RRR, no M  Gastrointestinal: BS+, soft, mild incisional tend/ND, neg HSM,  Extremities: No peripheral edema, neg clubing, cyanosis  Vascular: 2+ peripheral pulses  Neurological: A/O x 3, no focal deficits  Psychiatric: Normal mood, normal affect  Skin: No rashes    LABS:  CBC Full  -  ( 07 Jan 2021 07:33 )  WBC Count : 7.14 K/uL  RBC Count : 4.04 M/uL  Hemoglobin : 11.5 g/dL  Hematocrit : 34.9 %  Platelet Count - Automated : 209 K/uL  Mean Cell Volume : 86.4 fl  Mean Cell Hemoglobin : 28.5 pg  Mean Cell Hemoglobin Concentration : 33.0 gm/dL  Auto Neutrophil # : x  Auto Lymphocyte # : x  Auto Monocyte # : x  Auto Eosinophil # : x  Auto Basophil # : x  Auto Neutrophil % : x  Auto Lymphocyte % : x  Auto Monocyte % : x  Auto Eosinophil % : x  Auto Basophil % : x    01-07    138  |  105  |  10  ----------------------------<  127<H>  4.2   |  29  |  0.92    Ca    8.6      07 Jan 2021 07:33  Phos  2.7     01-07  Mg     2.1     01-07    TPro  6.7  /  Alb  2.7<L>  /  TBili  0.5  /  DBili  0.2  /  AST  53<H>  /  ALT  127<H>  /  AlkPhos  75  01-07    PT/INR - ( 06 Jan 2021 08:01 )   PT: 14.5 sec;   INR: 1.25 ratio         PTT - ( 06 Jan 2021 08:01 )  PTT:29.2 sec        RADIOLOGY & ADDITIONAL STUDIES:

## 2021-01-07 NOTE — DISCHARGE NOTE PROVIDER - NSDCFUADDINST_GEN_ALL_CORE_FT
Please seek immediate medical attention for fever>100.4, chest pain, shortness of breath, skin/eye color changes, worsening abdominal pain, any adverse changes to health

## 2021-01-07 NOTE — DISCHARGE NOTE PROVIDER - HOSPITAL COURSE
29yo M presented w/ acute cholecystitis confirmed with HIDA, s/p laparoscopic cholecystectomy POD#1. Pain has improved significantly, tolerating diet, passing BM/flatus. Denies fever/chills, shortness of breath, chest pain

## 2021-01-07 NOTE — DISCHARGE NOTE PROVIDER - CARE PROVIDER_API CALL
Leela Martinez  SURGERY  [address] [facility], [address] [place] [address]  Phone: [phone]  Fax: [phone]  Follow Up Time: 2 weeks

## 2021-01-11 LAB
CULTURE RESULTS: SIGNIFICANT CHANGE UP
SPECIMEN SOURCE: SIGNIFICANT CHANGE UP

## 2021-01-13 ENCOUNTER — TRANSCRIPTION ENCOUNTER (OUTPATIENT)
Age: 31
End: 2021-01-13

## 2021-01-13 PROBLEM — F90.9 ATTENTION-DEFICIT HYPERACTIVITY DISORDER, UNSPECIFIED TYPE: Chronic | Status: ACTIVE | Noted: 2021-01-05

## 2021-01-13 PROBLEM — F90.9 ATTENTION-DEFICIT HYPERACTIVITY DISORDER, UNSPECIFIED TYPE: Chronic | Status: ACTIVE | Noted: 2021-01-10

## 2021-01-13 PROBLEM — K25.9 GASTRIC ULCER, UNSPECIFIED AS ACUTE OR CHRONIC, WITHOUT HEMORRHAGE OR PERFORATION: Chronic | Status: ACTIVE | Noted: 2021-01-10

## 2021-01-13 PROBLEM — F41.9 ANXIETY DISORDER, UNSPECIFIED: Chronic | Status: ACTIVE | Noted: 2021-01-05

## 2021-01-13 PROBLEM — K20.90 ESOPHAGITIS, UNSPECIFIED WITHOUT BLEEDING: Chronic | Status: ACTIVE | Noted: 2021-01-10

## 2021-01-13 PROBLEM — Z87.19 PERSONAL HISTORY OF OTHER DISEASES OF THE DIGESTIVE SYSTEM: Chronic | Status: ACTIVE | Noted: 2021-01-05

## 2021-01-17 ENCOUNTER — INPATIENT (INPATIENT)
Facility: HOSPITAL | Age: 31
LOS: 0 days | Discharge: HOME | DRG: 446 | End: 2021-01-18
Attending: SURGERY | Admitting: SURGERY
Payer: COMMERCIAL

## 2021-01-17 VITALS
TEMPERATURE: 99 F | OXYGEN SATURATION: 98 % | DIASTOLIC BLOOD PRESSURE: 90 MMHG | SYSTOLIC BLOOD PRESSURE: 141 MMHG | HEART RATE: 114 BPM | RESPIRATION RATE: 20 BRPM

## 2021-01-17 DIAGNOSIS — Z90.49 ACQUIRED ABSENCE OF OTHER SPECIFIED PARTS OF DIGESTIVE TRACT: Chronic | ICD-10-CM

## 2021-01-17 DIAGNOSIS — R10.13 EPIGASTRIC PAIN: ICD-10-CM

## 2021-01-17 LAB
ALBUMIN SERPL ELPH-MCNC: 4.1 G/DL — SIGNIFICANT CHANGE UP (ref 3.3–5)
ALP SERPL-CCNC: 196 U/L — HIGH (ref 40–120)
ALT FLD-CCNC: 199 U/L — HIGH (ref 12–78)
ANION GAP SERPL CALC-SCNC: 4 MMOL/L — LOW (ref 5–17)
APPEARANCE UR: CLEAR — SIGNIFICANT CHANGE UP
APTT BLD: 32.6 SEC — SIGNIFICANT CHANGE UP (ref 27.5–35.5)
AST SERPL-CCNC: 124 U/L — HIGH (ref 15–37)
BACTERIA # UR AUTO: ABNORMAL
BASOPHILS # BLD AUTO: 0.05 K/UL — SIGNIFICANT CHANGE UP (ref 0–0.2)
BASOPHILS NFR BLD AUTO: 0.3 % — SIGNIFICANT CHANGE UP (ref 0–2)
BILIRUB SERPL-MCNC: 1.2 MG/DL — SIGNIFICANT CHANGE UP (ref 0.2–1.2)
BILIRUB UR-MCNC: ABNORMAL
BLD GP AB SCN SERPL QL: SIGNIFICANT CHANGE UP
BUN SERPL-MCNC: 18 MG/DL — SIGNIFICANT CHANGE UP (ref 7–23)
CALCIUM SERPL-MCNC: 9.8 MG/DL — SIGNIFICANT CHANGE UP (ref 8.5–10.1)
CHLORIDE SERPL-SCNC: 105 MMOL/L — SIGNIFICANT CHANGE UP (ref 96–108)
CO2 SERPL-SCNC: 29 MMOL/L — SIGNIFICANT CHANGE UP (ref 22–31)
COLOR SPEC: YELLOW — SIGNIFICANT CHANGE UP
COMMENT - URINE: SIGNIFICANT CHANGE UP
CREAT SERPL-MCNC: 1.2 MG/DL — SIGNIFICANT CHANGE UP (ref 0.5–1.3)
DIFF PNL FLD: ABNORMAL
EOSINOPHIL # BLD AUTO: 0.04 K/UL — SIGNIFICANT CHANGE UP (ref 0–0.5)
EOSINOPHIL NFR BLD AUTO: 0.3 % — SIGNIFICANT CHANGE UP (ref 0–6)
EPI CELLS # UR: NEGATIVE — SIGNIFICANT CHANGE UP
GLUCOSE SERPL-MCNC: 97 MG/DL — SIGNIFICANT CHANGE UP (ref 70–99)
GLUCOSE UR QL: NEGATIVE MG/DL — SIGNIFICANT CHANGE UP
HCT VFR BLD CALC: 42.5 % — SIGNIFICANT CHANGE UP (ref 39–50)
HGB BLD-MCNC: 14.2 G/DL — SIGNIFICANT CHANGE UP (ref 13–17)
IMM GRANULOCYTES NFR BLD AUTO: 0.3 % — SIGNIFICANT CHANGE UP (ref 0–1.5)
INR BLD: 1.17 RATIO — HIGH (ref 0.88–1.16)
KETONES UR-MCNC: ABNORMAL
LACTATE SERPL-SCNC: 1 MMOL/L — SIGNIFICANT CHANGE UP (ref 0.7–2)
LEUKOCYTE ESTERASE UR-ACNC: ABNORMAL
LIDOCAIN IGE QN: 59 U/L — LOW (ref 73–393)
LYMPHOCYTES # BLD AUTO: 1.68 K/UL — SIGNIFICANT CHANGE UP (ref 1–3.3)
LYMPHOCYTES # BLD AUTO: 11.4 % — LOW (ref 13–44)
MCHC RBC-ENTMCNC: 28.4 PG — SIGNIFICANT CHANGE UP (ref 27–34)
MCHC RBC-ENTMCNC: 33.4 GM/DL — SIGNIFICANT CHANGE UP (ref 32–36)
MCV RBC AUTO: 85 FL — SIGNIFICANT CHANGE UP (ref 80–100)
MONOCYTES # BLD AUTO: 0.71 K/UL — SIGNIFICANT CHANGE UP (ref 0–0.9)
MONOCYTES NFR BLD AUTO: 4.8 % — SIGNIFICANT CHANGE UP (ref 2–14)
NEUTROPHILS # BLD AUTO: 12.24 K/UL — HIGH (ref 1.8–7.4)
NEUTROPHILS NFR BLD AUTO: 82.9 % — HIGH (ref 43–77)
NITRITE UR-MCNC: POSITIVE
PH UR: 7 — SIGNIFICANT CHANGE UP (ref 5–8)
PLATELET # BLD AUTO: 482 K/UL — HIGH (ref 150–400)
POTASSIUM SERPL-MCNC: 4.1 MMOL/L — SIGNIFICANT CHANGE UP (ref 3.5–5.3)
POTASSIUM SERPL-SCNC: 4.1 MMOL/L — SIGNIFICANT CHANGE UP (ref 3.5–5.3)
PROT SERPL-MCNC: 8.6 GM/DL — HIGH (ref 6–8.3)
PROT UR-MCNC: 100 MG/DL
PROTHROM AB SERPL-ACNC: 13.6 SEC — SIGNIFICANT CHANGE UP (ref 10.6–13.6)
RBC # BLD: 5 M/UL — SIGNIFICANT CHANGE UP (ref 4.2–5.8)
RBC # FLD: 12.8 % — SIGNIFICANT CHANGE UP (ref 10.3–14.5)
RBC CASTS # UR COMP ASSIST: ABNORMAL /HPF (ref 0–4)
SARS-COV-2 RNA SPEC QL NAA+PROBE: SIGNIFICANT CHANGE UP
SODIUM SERPL-SCNC: 138 MMOL/L — SIGNIFICANT CHANGE UP (ref 135–145)
SP GR SPEC: 1.01 — SIGNIFICANT CHANGE UP (ref 1.01–1.02)
UROBILINOGEN FLD QL: 8 MG/DL
WBC # BLD: 14.77 K/UL — HIGH (ref 3.8–10.5)
WBC # FLD AUTO: 14.77 K/UL — HIGH (ref 3.8–10.5)
WBC UR QL: SIGNIFICANT CHANGE UP

## 2021-01-17 PROCEDURE — 85027 COMPLETE CBC AUTOMATED: CPT

## 2021-01-17 PROCEDURE — 36415 COLL VENOUS BLD VENIPUNCTURE: CPT

## 2021-01-17 PROCEDURE — A9579: CPT

## 2021-01-17 PROCEDURE — C1769: CPT

## 2021-01-17 PROCEDURE — 87040 BLOOD CULTURE FOR BACTERIA: CPT

## 2021-01-17 PROCEDURE — 74183 MRI ABD W/O CNTR FLWD CNTR: CPT

## 2021-01-17 PROCEDURE — 80053 COMPREHEN METABOLIC PANEL: CPT

## 2021-01-17 PROCEDURE — C9113: CPT

## 2021-01-17 PROCEDURE — 74177 CT ABD & PELVIS W/CONTRAST: CPT | Mod: 26

## 2021-01-17 PROCEDURE — 86769 SARS-COV-2 COVID-19 ANTIBODY: CPT

## 2021-01-17 RX ORDER — CEFTRIAXONE 500 MG/1
1000 INJECTION, POWDER, FOR SOLUTION INTRAMUSCULAR; INTRAVENOUS EVERY 12 HOURS
Refills: 0 | Status: DISCONTINUED | OUTPATIENT
Start: 2021-01-17 | End: 2021-01-17

## 2021-01-17 RX ORDER — KETOROLAC TROMETHAMINE 30 MG/ML
30 SYRINGE (ML) INJECTION ONCE
Refills: 0 | Status: DISCONTINUED | OUTPATIENT
Start: 2021-01-17 | End: 2021-01-17

## 2021-01-17 RX ORDER — ENOXAPARIN SODIUM 100 MG/ML
40 INJECTION SUBCUTANEOUS ONCE
Refills: 0 | Status: COMPLETED | OUTPATIENT
Start: 2021-01-17 | End: 2021-01-18

## 2021-01-17 RX ORDER — CEFTRIAXONE 500 MG/1
1000 INJECTION, POWDER, FOR SOLUTION INTRAMUSCULAR; INTRAVENOUS EVERY 12 HOURS
Refills: 0 | Status: DISCONTINUED | OUTPATIENT
Start: 2021-01-17 | End: 2021-01-18

## 2021-01-17 RX ORDER — ACETAMINOPHEN 500 MG
1000 TABLET ORAL ONCE
Refills: 0 | Status: COMPLETED | OUTPATIENT
Start: 2021-01-17 | End: 2021-01-18

## 2021-01-17 RX ORDER — METRONIDAZOLE 500 MG
500 TABLET ORAL EVERY 8 HOURS
Refills: 0 | Status: DISCONTINUED | OUTPATIENT
Start: 2021-01-17 | End: 2021-01-18

## 2021-01-17 RX ORDER — MORPHINE SULFATE 50 MG/1
4 CAPSULE, EXTENDED RELEASE ORAL ONCE
Refills: 0 | Status: DISCONTINUED | OUTPATIENT
Start: 2021-01-17 | End: 2021-01-17

## 2021-01-17 RX ORDER — SODIUM CHLORIDE 9 MG/ML
1000 INJECTION INTRAMUSCULAR; INTRAVENOUS; SUBCUTANEOUS ONCE
Refills: 0 | Status: COMPLETED | OUTPATIENT
Start: 2021-01-17 | End: 2021-01-17

## 2021-01-17 RX ORDER — HYDROMORPHONE HYDROCHLORIDE 2 MG/ML
1 INJECTION INTRAMUSCULAR; INTRAVENOUS; SUBCUTANEOUS EVERY 4 HOURS
Refills: 0 | Status: DISCONTINUED | OUTPATIENT
Start: 2021-01-17 | End: 2021-01-18

## 2021-01-17 RX ORDER — ONDANSETRON 8 MG/1
4 TABLET, FILM COATED ORAL ONCE
Refills: 0 | Status: COMPLETED | OUTPATIENT
Start: 2021-01-17 | End: 2021-01-17

## 2021-01-17 RX ORDER — NICOTINE POLACRILEX 2 MG
1 GUM BUCCAL DAILY
Refills: 0 | Status: DISCONTINUED | OUTPATIENT
Start: 2021-01-17 | End: 2021-01-18

## 2021-01-17 RX ORDER — SODIUM CHLORIDE 9 MG/ML
1000 INJECTION INTRAMUSCULAR; INTRAVENOUS; SUBCUTANEOUS
Refills: 0 | Status: DISCONTINUED | OUTPATIENT
Start: 2021-01-17 | End: 2021-01-18

## 2021-01-17 RX ADMIN — CEFTRIAXONE 1000 MILLIGRAM(S): 500 INJECTION, POWDER, FOR SOLUTION INTRAMUSCULAR; INTRAVENOUS at 22:58

## 2021-01-17 RX ADMIN — Medication 1 PATCH: at 20:55

## 2021-01-17 RX ADMIN — ONDANSETRON 4 MILLIGRAM(S): 8 TABLET, FILM COATED ORAL at 16:24

## 2021-01-17 RX ADMIN — MORPHINE SULFATE 4 MILLIGRAM(S): 50 CAPSULE, EXTENDED RELEASE ORAL at 18:24

## 2021-01-17 RX ADMIN — Medication 30 MILLIGRAM(S): at 21:00

## 2021-01-17 RX ADMIN — SODIUM CHLORIDE 1000 MILLILITER(S): 9 INJECTION INTRAMUSCULAR; INTRAVENOUS; SUBCUTANEOUS at 16:42

## 2021-01-17 RX ADMIN — SODIUM CHLORIDE 110 MILLILITER(S): 9 INJECTION INTRAMUSCULAR; INTRAVENOUS; SUBCUTANEOUS at 21:35

## 2021-01-17 RX ADMIN — Medication 100 MILLIGRAM(S): at 22:59

## 2021-01-17 RX ADMIN — MORPHINE SULFATE 4 MILLIGRAM(S): 50 CAPSULE, EXTENDED RELEASE ORAL at 16:24

## 2021-01-17 NOTE — ED STATDOCS - OBJECTIVE STATEMENT
29 y/o male with PMHx of ADHD, anxiety presents to the ED s/p emergent gallbladder removal 10 days go. Is now c/o severe stabbing abdominal pain, states he woke up from pain at about 2:30 in the morning, is now off medications. States pain is located in mid-abdominal region . Pain associated with nausea/vomiting/diarrhea. Has been passing gas. Denies fever. On Klonopin for sleep paralysis, Oxycodone. Was discharged from hospital last Wednesday and was feeling okay before today when he was on pain medications. 29 y/o male with PMHx of ADHD, anxiety presents to the ED s/p emergent gallbladder removal 10 days go. Is now c/o severe stabbing abdominal pain, states he woke up from pain at about 2:30 in the morning, is now off medications. States pain is located in mid-abdominal region . Pain associated with nausea/vomiting/diarrhea. Has been passing gas. Denies fever. On Klonopin for sleep paralysis. Also was prescribed Oxycodone. Was discharged from hospital last Wednesday and was feeling okay before today when he was on pain medications.

## 2021-01-17 NOTE — ED STATDOCS - ATTENDING CONTRIBUTION TO CARE
I, Korey Lafleur DO, personally saw the patient with ACP.  I have personally performed a face to face diagnostic evaluation on this patient and formulated the patient plan. The case was discussed with, and handed off to ACP who followed the case through to the re-evaluation and disposition.

## 2021-01-17 NOTE — H&P ADULT - NSHPLABSRESULTS_GEN_ALL_CORE
14.2   14.77 )-----------( 482      ( 17 Jan 2021 16:22 )             42.5     01-17    138  |  105  |  18  ----------------------------<  97  4.1   |  29  |  1.20    Ca    9.8      17 Jan 2021 16:22    TPro  8.6<H>  /  Alb  4.1  /  TBili  1.2  /  DBili  x   /  AST  124<H>  /  ALT  199<H>  /  AlkPhos  196<H>  01-17    Imaging:  < from: CT Abdomen and Pelvis w/ IV Cont (01.17.21 @ 17:07) >    IMPRESSION:  *  Increased mild intrahepatic and extrahepatic biliary dilatation. Correlate with LFTs to determine the clinical significance.  *  Diffuse bladder wall thickening. Correlate for UTI.    < end of copied text >

## 2021-01-17 NOTE — H&P ADULT - ASSESSMENT
29 yo male s/p laparoscopic cholecystectomy on 1/6/21 with elevated LFTs suspicious for retained stone and UTI    -Admission to surgical service  -Trend LFTs  -GI evaluation  -MRCP in AM  -Pain control PRN  -IV abx; Ceftriaxone, Flagyl  -DVT ppx    Discussed with surgical attending, Dr. Aragon

## 2021-01-17 NOTE — ED STATDOCS - PMH
ADHD    Anxiety    Attention deficit hyperkinetic disorder    Esophagitis    H/O esophageal ulcer    Stomach ulcer

## 2021-01-17 NOTE — ED STATDOCS - NS ED ROS FT
Constitutional: No fever.  Neurological: No headache.  Eyes: No vision changes.   Ears, Nose, Mouth, Throat: No congestion.  Cardiovascular: No chest pain.  Respiratory: No difficulty breathing.  Gastrointestinal: +abdominal pain, +nausea/vomiting/diarrhea   Genitourinary: No dysuria.  Musculoskeletal: No joint pain.  Integumentary (skin and/or breast): No rash.

## 2021-01-17 NOTE — H&P ADULT - NSICDXPASTMEDICALHX_GEN_ALL_CORE_FT
PAST MEDICAL HISTORY:  ADHD     Anxiety     Attention deficit hyperkinetic disorder     Esophagitis     H/O esophageal ulcer     Stomach ulcer

## 2021-01-17 NOTE — ED ADULT NURSE NOTE - OBJECTIVE STATEMENT
Patient is a 31 y/o male with  emergent gallbladder removal 10 days go. Is now c/o severe stabbing abdominal pain, states he woke up from pain at about 2:30 in the morning, is now off medications. States pain is located in mid-abdominal region . Pain associated with nausea/vomiting/diarrhea. Has been passing gas. Denies fever. On Klonopin for sleep paralysis, Oxycodone. Was discharged from hospital last Wednesday and was feeling okay before today when he was on pain medications. Patient is a 31 y/o male presents with stabbing abdominal pain that started at 2:30a this morning. Patient reported that the pain is located in mid-abdominal region . Reported N&V and  diarrhea.

## 2021-01-17 NOTE — ED STATDOCS - PHYSICAL EXAMINATION
Vital signs as available reviewed.  General:  Pt doubled over in pain.  Head:  Normocephalic, atraumatic.  Eyes:  Conjunctiva pink, no icterus.  Cardiovascular:  Regular rate, no obvious murmur.  Respiratory:  Clear to auscultation, good air entry bilaterally.  Abdomen:  Epigastric/ bilateral upper abdominal TTP  Musculoskeletal:  No deformity or calf tenderness.  Neurologic: Alert and oriented, moving all extremities.  Skin:  Warm and dry.

## 2021-01-17 NOTE — ED ADULT TRIAGE NOTE - CHIEF COMPLAINT QUOTE
pt c.o abdominal pain with NV with today. pt states he had his gallbladder removed on Wednesday. no fever s

## 2021-01-17 NOTE — ED STATDOCS - PROGRESS NOTE DETAILS
31 y/o male with PMHx of ADHD, anxiety presents to the ED s/p emergent gallbladder removal 10 days go. Is now c/o severe stabbing abdominal pain, states he woke up from pain at about 2:30 in the morning, is now off medications. States pain is located in mid-abdominal region . Pain associated with nausea/vomiting/diarrhea.   Pt bent over in wheelchair in Intake room.  Able to move to stretcher for exam.  Hypoactive Bs x4, Soft, (+) Epigastric tenderness.  Will F/U Labs, CT.  Karyna Mathews PA-C Ct findings of intra and extra-hepatic biliary dilatation.  LFTs are trending up.  , .  Discussed case with Surgery and Frank Chandra of surgery.  Surgery resident will come to adolfo pt.  GI recommended MRCP.  Dr. Tai will discuss with attending who can now perform ERCP on site too.  Karyna Mathews PA-C

## 2021-01-17 NOTE — ED STATDOCS - CLINICAL SUMMARY MEDICAL DECISION MAKING FREE TEXT BOX
Rule out pancreatitis, SBO less likely but differential may also represent gastritis or complication from gallbladder surgery.

## 2021-01-17 NOTE — H&P ADULT - HISTORY OF PRESENT ILLNESS
29 yo male s/p Laparoscopic cholecystectomy on 1/6/21. Patient has had substantial post operative pain taking multiple percocet per day. Pain has worsened over the past day. Patient additionally complains of burning with urination following surgery.  Denies fevers or chills. Admits to generalized malaise.

## 2021-01-17 NOTE — ED STATDOCS - CARE PLAN
Principal Discharge DX:	Epigastric pain  Secondary Diagnosis:	Non-intractable vomiting with nausea

## 2021-01-18 ENCOUNTER — TRANSCRIPTION ENCOUNTER (OUTPATIENT)
Age: 31
End: 2021-01-18

## 2021-01-18 VITALS
SYSTOLIC BLOOD PRESSURE: 106 MMHG | TEMPERATURE: 98 F | OXYGEN SATURATION: 100 % | HEART RATE: 64 BPM | DIASTOLIC BLOOD PRESSURE: 53 MMHG | RESPIRATION RATE: 16 BRPM

## 2021-01-18 LAB
ALBUMIN SERPL ELPH-MCNC: 3.2 G/DL — LOW (ref 3.3–5)
ALP SERPL-CCNC: 179 U/L — HIGH (ref 40–120)
ALT FLD-CCNC: 242 U/L — HIGH (ref 12–78)
ANION GAP SERPL CALC-SCNC: 7 MMOL/L — SIGNIFICANT CHANGE UP (ref 5–17)
AST SERPL-CCNC: 98 U/L — HIGH (ref 15–37)
BILIRUB SERPL-MCNC: 0.5 MG/DL — SIGNIFICANT CHANGE UP (ref 0.2–1.2)
BUN SERPL-MCNC: 23 MG/DL — SIGNIFICANT CHANGE UP (ref 7–23)
CALCIUM SERPL-MCNC: 9 MG/DL — SIGNIFICANT CHANGE UP (ref 8.5–10.1)
CHLORIDE SERPL-SCNC: 109 MMOL/L — HIGH (ref 96–108)
CO2 SERPL-SCNC: 25 MMOL/L — SIGNIFICANT CHANGE UP (ref 22–31)
CREAT SERPL-MCNC: 1.08 MG/DL — SIGNIFICANT CHANGE UP (ref 0.5–1.3)
GLUCOSE SERPL-MCNC: 78 MG/DL — SIGNIFICANT CHANGE UP (ref 70–99)
HCT VFR BLD CALC: 35.6 % — LOW (ref 39–50)
HGB BLD-MCNC: 11.6 G/DL — LOW (ref 13–17)
MCHC RBC-ENTMCNC: 28.6 PG — SIGNIFICANT CHANGE UP (ref 27–34)
MCHC RBC-ENTMCNC: 32.6 GM/DL — SIGNIFICANT CHANGE UP (ref 32–36)
MCV RBC AUTO: 87.7 FL — SIGNIFICANT CHANGE UP (ref 80–100)
PLATELET # BLD AUTO: 313 K/UL — SIGNIFICANT CHANGE UP (ref 150–400)
POTASSIUM SERPL-MCNC: 4.8 MMOL/L — SIGNIFICANT CHANGE UP (ref 3.5–5.3)
POTASSIUM SERPL-SCNC: 4.8 MMOL/L — SIGNIFICANT CHANGE UP (ref 3.5–5.3)
PROT SERPL-MCNC: 6.9 GM/DL — SIGNIFICANT CHANGE UP (ref 6–8.3)
RBC # BLD: 4.06 M/UL — LOW (ref 4.2–5.8)
RBC # FLD: 12.7 % — SIGNIFICANT CHANGE UP (ref 10.3–14.5)
SARS-COV-2 IGG SERPL QL IA: NEGATIVE — SIGNIFICANT CHANGE UP
SARS-COV-2 IGM SERPL IA-ACNC: 0.07 INDEX — SIGNIFICANT CHANGE UP
SODIUM SERPL-SCNC: 141 MMOL/L — SIGNIFICANT CHANGE UP (ref 135–145)
WBC # BLD: 5.7 K/UL — SIGNIFICANT CHANGE UP (ref 3.8–10.5)
WBC # FLD AUTO: 5.7 K/UL — SIGNIFICANT CHANGE UP (ref 3.8–10.5)

## 2021-01-18 PROCEDURE — 43259 EGD US EXAM DUODENUM/JEJUNUM: CPT

## 2021-01-18 PROCEDURE — 74183 MRI ABD W/O CNTR FLWD CNTR: CPT | Mod: 26

## 2021-01-18 RX ORDER — MEPERIDINE HYDROCHLORIDE 50 MG/ML
12.5 INJECTION INTRAMUSCULAR; INTRAVENOUS; SUBCUTANEOUS
Refills: 0 | Status: DISCONTINUED | OUTPATIENT
Start: 2021-01-18 | End: 2021-01-18

## 2021-01-18 RX ORDER — HYDROMORPHONE HYDROCHLORIDE 2 MG/ML
0.5 INJECTION INTRAMUSCULAR; INTRAVENOUS; SUBCUTANEOUS
Refills: 0 | Status: DISCONTINUED | OUTPATIENT
Start: 2021-01-18 | End: 2021-01-18

## 2021-01-18 RX ORDER — PANTOPRAZOLE SODIUM 20 MG/1
40 TABLET, DELAYED RELEASE ORAL DAILY
Refills: 0 | Status: DISCONTINUED | OUTPATIENT
Start: 2021-01-18 | End: 2021-01-18

## 2021-01-18 RX ORDER — ONDANSETRON 8 MG/1
4 TABLET, FILM COATED ORAL ONCE
Refills: 0 | Status: DISCONTINUED | OUTPATIENT
Start: 2021-01-18 | End: 2021-01-18

## 2021-01-18 RX ORDER — HEPARIN SODIUM 5000 [USP'U]/ML
5000 INJECTION INTRAVENOUS; SUBCUTANEOUS EVERY 8 HOURS
Refills: 0 | Status: DISCONTINUED | OUTPATIENT
Start: 2021-01-18 | End: 2021-01-18

## 2021-01-18 RX ORDER — CEFUROXIME AXETIL 250 MG
1 TABLET ORAL
Qty: 24 | Refills: 0
Start: 2021-01-18 | End: 2021-01-29

## 2021-01-18 RX ORDER — CLONAZEPAM 1 MG
1 TABLET ORAL EVERY 8 HOURS
Refills: 0 | Status: DISCONTINUED | OUTPATIENT
Start: 2021-01-18 | End: 2021-01-18

## 2021-01-18 RX ORDER — OXYCODONE HYDROCHLORIDE 5 MG/1
10 TABLET ORAL ONCE
Refills: 0 | Status: DISCONTINUED | OUTPATIENT
Start: 2021-01-18 | End: 2021-01-18

## 2021-01-18 RX ORDER — NICOTINE POLACRILEX 2 MG
1 GUM BUCCAL DAILY
Refills: 0 | Status: DISCONTINUED | OUTPATIENT
Start: 2021-01-18 | End: 2021-01-18

## 2021-01-18 RX ORDER — SODIUM CHLORIDE 9 MG/ML
1000 INJECTION, SOLUTION INTRAVENOUS
Refills: 0 | Status: DISCONTINUED | OUTPATIENT
Start: 2021-01-18 | End: 2021-01-18

## 2021-01-18 RX ORDER — FENTANYL CITRATE 50 UG/ML
50 INJECTION INTRAVENOUS
Refills: 0 | Status: DISCONTINUED | OUTPATIENT
Start: 2021-01-18 | End: 2021-01-18

## 2021-01-18 RX ORDER — ONDANSETRON 8 MG/1
4 TABLET, FILM COATED ORAL EVERY 6 HOURS
Refills: 0 | Status: DISCONTINUED | OUTPATIENT
Start: 2021-01-18 | End: 2021-01-18

## 2021-01-18 RX ORDER — ACETAMINOPHEN 500 MG
1000 TABLET ORAL EVERY 6 HOURS
Refills: 0 | Status: DISCONTINUED | OUTPATIENT
Start: 2021-01-18 | End: 2021-01-18

## 2021-01-18 RX ORDER — KETOROLAC TROMETHAMINE 30 MG/ML
30 SYRINGE (ML) INJECTION EVERY 6 HOURS
Refills: 0 | Status: DISCONTINUED | OUTPATIENT
Start: 2021-01-18 | End: 2021-01-18

## 2021-01-18 RX ADMIN — Medication 400 MILLIGRAM(S): at 12:13

## 2021-01-18 RX ADMIN — SODIUM CHLORIDE 110 MILLILITER(S): 9 INJECTION INTRAMUSCULAR; INTRAVENOUS; SUBCUTANEOUS at 20:13

## 2021-01-18 RX ADMIN — Medication 1 MILLIGRAM(S): at 20:12

## 2021-01-18 RX ADMIN — Medication 30 MILLIGRAM(S): at 14:38

## 2021-01-18 RX ADMIN — Medication 400 MILLIGRAM(S): at 00:23

## 2021-01-18 RX ADMIN — ENOXAPARIN SODIUM 40 MILLIGRAM(S): 100 INJECTION SUBCUTANEOUS at 00:45

## 2021-01-18 RX ADMIN — Medication 100 MILLIGRAM(S): at 05:49

## 2021-01-18 RX ADMIN — Medication 30 MILLIGRAM(S): at 09:14

## 2021-01-18 RX ADMIN — Medication 100 MILLIGRAM(S): at 14:37

## 2021-01-18 RX ADMIN — Medication 1 PATCH: at 09:25

## 2021-01-18 RX ADMIN — Medication 1 PATCH: at 10:47

## 2021-01-18 RX ADMIN — SODIUM CHLORIDE 110 MILLILITER(S): 9 INJECTION INTRAMUSCULAR; INTRAVENOUS; SUBCUTANEOUS at 05:49

## 2021-01-18 RX ADMIN — CEFTRIAXONE 1000 MILLIGRAM(S): 500 INJECTION, POWDER, FOR SOLUTION INTRAMUSCULAR; INTRAVENOUS at 09:27

## 2021-01-18 RX ADMIN — HYDROMORPHONE HYDROCHLORIDE 1 MILLIGRAM(S): 2 INJECTION INTRAMUSCULAR; INTRAVENOUS; SUBCUTANEOUS at 05:58

## 2021-01-18 RX ADMIN — HEPARIN SODIUM 5000 UNIT(S): 5000 INJECTION INTRAVENOUS; SUBCUTANEOUS at 14:38

## 2021-01-18 RX ADMIN — Medication 1 PATCH: at 10:46

## 2021-01-18 RX ADMIN — PANTOPRAZOLE SODIUM 40 MILLIGRAM(S): 20 TABLET, DELAYED RELEASE ORAL at 09:14

## 2021-01-18 RX ADMIN — Medication 400 MILLIGRAM(S): at 17:34

## 2021-01-18 RX ADMIN — Medication 1 PATCH: at 20:06

## 2021-01-18 NOTE — CONSULT NOTE ADULT - ASSESSMENT
30-year-old male status post cholecystectomy with abdominal pain and elevated LFTs  Rule out common bile duct stones.  MRCP    IV fluids, analgesics as tolerated.  Antibiotics for now, but would try to shorten the course if possible.

## 2021-01-18 NOTE — PATIENT PROFILE ADULT - LONGEST PERIOD TOBACCO-FREE
62y male PMH HTN, COPD, aflutter s/p ablation (2015) on eliquis, Prostate Ca (diag ~2013) s/p radiation seed implants, no chemo w/ ?mets to spine and pelvis, who presented to [facility] for elective epigastric hernia repair following an admission for SBO Post operatively patient was transferred to the floor and was hemodynamically stable. On POD1 patient was noted to be hypotensive, then became tachycardic and hypoxic on the floor. Labs were significant for hypoxemia and an elevated lactate to 5.3. Patient was given 2u LR bolus and SICU was consulted. He subsequently received a third liter of LR with improvement of his blood pressure. A nasogastric tube was placed for increased distension, pain and nausea. Cardiology consulted for elevated cardiac enzymes CK 1024 CKMB 54.51 Troponin 1.55. New Q waves and ST changes in anteroseptal leads. Patient diagnosed with NSTEMI, he was started on aspirin and heparin drip. He was started on high dose lipitor. Patient on Metroprolol 100 bid. Symptoms resolved. Heparin gtt transitioned to eliquis. Patient went for a nuclear stress test on 6/5 ...  At this time, pt is tolerating a regular diet, ambulating and voiding.  Pt has been deemed stable for discharge at this time. 62y male PMH HTN, COPD, aflutter s/p ablation (2015) on eliquis, Prostate Ca (diag ~2013) s/p radiation seed implants, no chemo w/ ?mets to spine and pelvis, who presented to [facility] for elective epigastric hernia repair following an admission for SBO Post operatively patient was transferred to the floor and was hemodynamically stable. On POD1 patient was noted to be hypotensive, then became tachycardic and hypoxic on the floor. Labs were significant for hypoxemia and an elevated lactate to 5.3. Patient was given 2u LR bolus and SICU was consulted. He subsequently received a third liter of LR with improvement of his blood pressure. A nasogastric tube was placed for increased distension, pain and nausea. Cardiology consulted for elevated cardiac enzymes CK 1024 CKMB 54.51 Troponin 1.55. New Q waves and ST changes in anteroseptal leads. Patient diagnosed with NSTEMI, he was started on aspirin and heparin drip. He was started on high dose lipitor. Patient on Metroprolol 100 bid. Symptoms resolved. Heparin gtt transitioned to eliquis. Patient went for a nuclear stress test on 6/5 Abnormal Study * The left ventricle was normal in size. There are medium sized, moderate to severe defects in inferior and inferoseptal walls that are fixed, suggestive of infarct. * Post-stress gated wall motion analysis was performed (LVEF = 47 %;LVEDV = 94 ml.), revealing mild overall hypokinesis, due to segmental wall motion abnormality. There was mild inferior / inferoseptal hypokinesis with severely diminished systolic thickening. * No clear evidence of ischemia. Cardiology recommended ?????  At this time, pt is tolerating a regular diet, ambulating and voiding.  Pt has been deemed stable for discharge at this time. 62y male PMH HTN, COPD, aflutter s/p ablation (2015) on eliquis, Prostate Ca (diag ~2013) s/p radiation seed implants, no chemo w/ ?mets to spine and pelvis, who presented to [facility] for elective epigastric hernia repair following an admission for SBO Post operatively patient was transferred to the floor and was hemodynamically stable. On POD1 patient was noted to be hypotensive, then became tachycardic and hypoxic on the floor. Labs were significant for hypoxemia and an elevated lactate to 5.3. Patient was given 2u LR bolus and SICU was consulted. He subsequently received a third liter of LR with improvement of his blood pressure. A nasogastric tube was placed for increased distension, pain and nausea. Cardiology consulted for elevated cardiac enzymes CK 1024 CKMB 54.51 Troponin 1.55. New Q waves and ST changes in anteroseptal leads. Patient diagnosed with NSTEMI, he was started on aspirin and heparin drip. He was started on high dose lipitor. Patient on Metroprolol 100 bid. Symptoms resolved. Heparin gtt transitioned to eliquis. Patient went for a nuclear stress test on 6/5 Abnormal Study * The left ventricle was normal in size. There are medium sized, moderate to severe defects in inferior and inferoseptal walls that are fixed, suggestive of infarct. * Post-stress gated wall motion analysis was performed (LVEF = 47 %;LVEDV = 94 ml.), revealing mild overall hypokinesis, due to segmental wall motion abnormality. There was mild inferior / inferoseptal hypokinesis with severely diminished systolic thickening. * No clear evidence of ischemia. Cardiology cleared for patient to be discharged.  At this time, pt is tolerating a regular diet, ambulating and voiding.  Pt has been deemed stable for discharge at this time. one year on and off

## 2021-01-18 NOTE — SBIRT NOTE ADULT - NSSBIRTDRGBRIEFINTDET_GEN_A_CORE
SW spoke with pt. about vape use, SW explored if this is something he does daily and asked if pt, was aware if he knows where he is getting products from, pt. reports yes, and he is mindful of use. he denies need for referral or resources, he states he stopped use prior to sx.

## 2021-01-18 NOTE — DISCHARGE NOTE PROVIDER - CARE PROVIDER_API CALL
Leela Martinez)  Surgery; Surgical Critical Care  [address] [facility], [address] [place] [address]  Phone: [phone]  Fax: [phone]  Follow Up Time:

## 2021-01-18 NOTE — DISCHARGE NOTE PROVIDER - HOSPITAL COURSE
31 yo male s/p Laparoscopic cholecystectomy on 1/6/21. Patient has had substantial post operative pain taking multiple percocet per day. Pain has worsened over the past day. Patient additionally complains of burning with urination following surgery.  Denies fevers or chills. Admits to generalized malaise. Found to have no stone within the biliary ducts but has extra and intrahepatic duct dilation. once stable to [place] home

## 2021-01-18 NOTE — DISCHARGE NOTE PROVIDER - NSDCMRMEDTOKEN_GEN_ALL_CORE_FT
Adderall 15 mg oral tablet: 15 milligram(s) orally once a day  cefuroxime 500 mg oral tablet: 1 tab(s) orally 2 times a day   KlonoPIN 1 mg oral tablet: 1 milligram(s) orally , As Needed  oxyCODONE 5 mg oral tablet: 1 tab(s) orally every 6 hours, As Needed -for moderate pain MDD:4 tabs

## 2021-01-18 NOTE — PATIENT PROFILE ADULT - SURGICAL SITE DESCRIPTION
Lap sites to abd x 4, right side abd, right middle abd, middle epigastric area and umbilicus with steri strip intact.  epigastric area with no steri strip, site clean and intact, no drainage

## 2021-01-18 NOTE — SBIRT NOTE ADULT - NSSBIRTALCPOSREINDET_GEN_A_CORE
Sw provided positive reinforcement about mindful consumption, pt. reports he "barely even drinks socially"

## 2021-01-18 NOTE — PROGRESS NOTE ADULT - ASSESSMENT
29 yo male s/p laparoscopic cholecystectomy on 1/6/21 with elevated LFTs suspicious for retained stone and UTI      -Trend LFTs  -GI evaluation  -MRCP in AM  -Pain control PRN  -IV abx; Ceftriaxone, Flagyl  -DVT ppx    Discussed with surgical attending

## 2021-01-18 NOTE — DISCHARGE NOTE PROVIDER - NSDCCPCAREPLAN_GEN_ALL_CORE_FT
PRINCIPAL DISCHARGE DIAGNOSIS  Diagnosis: Epigastric pain  Assessment and Plan of Treatment:       SECONDARY DISCHARGE DIAGNOSES  Diagnosis: Non-intractable vomiting with nausea  Assessment and Plan of Treatment:

## 2021-01-18 NOTE — PROGRESS NOTE ADULT - ATTENDING COMMENTS
Pt seen and examined, no fever, moderate pain, LFTS trending down, WBC normalized  NPO   Iv antibiotics  IV hydration  MRCP   DVT prophylaxis.  GI for possible ERCP

## 2021-01-18 NOTE — SBIRT NOTE ADULT - NSSBIRTDRGUSEDOTHTHAN_GEN_A_CORE
pt. states he vapes daily. he is aware where he gets products from and states no concerns or need for referral./Yes

## 2021-01-18 NOTE — CONSULT NOTE ADULT - SUBJECTIVE AND OBJECTIVE BOX
Patient is a 30y old  Male who presents with a chief complaint of post op elevation LFTs (18 Jan 2021 08:31)      HPI:  31 yo male s/p Laparoscopic cholecystectomy on 1/6/21. Patient has had substantial post operative pain taking multiple percocet per day. Pain has worsened over the past day. Patient additionally complains of burning with urination following surgery.  Denies fevers or chills. Admits to generalized malaise.  (17 Jan 2021 19:23)    Patient states that pain started 2 days prior to admission.  He had predominantly epigastric and mid abdominal pain associated with some nausea but no vomiting.  Analgesics would not hold him.  The pain was intermittent and at times, would get more severe right does not think that it was related to foods.  Positive bowel movements.  Negative vomiting  PAST MEDICAL & SURGICAL HISTORY:  Esophagitis    Stomach ulcer    ADHD    Anxiety    Attention deficit hyperkinetic disorder    H/O esophageal ulcer    S/P laparoscopic cholecystectomy        MEDICATIONS  (STANDING):  acetaminophen  IVPB .. 1000 milliGRAM(s) IV Intermittent every 6 hours  cefTRIAXone Injectable. 1000 milliGRAM(s) IV Push every 12 hours  metroNIDAZOLE  IVPB 500 milliGRAM(s) IV Intermittent every 8 hours  nicotine -  14 mG/24Hr(s) Patch 1 patch Transdermal daily  pantoprazole  Injectable 40 milliGRAM(s) IV Push daily  sodium chloride 0.9%. 1000 milliLiter(s) (110 mL/Hr) IV Continuous <Continuous>    MEDICATIONS  (PRN):  ketorolac   Injectable 30 milliGRAM(s) IV Push every 6 hours PRN Severe Pain (7 - 10)  ondansetron Injectable 4 milliGRAM(s) IV Push every 6 hours PRN Nausea and/or Vomiting      Allergies    Prozac (Vomiting)  Red wine allergy (Vomiting)    Intolerances        SOCIAL HISTORY:    FAMILY HISTORY:NC      REVIEW OF SYSTEMS:    CONSTITUTIONAL: No weakness, fevers or chills  EYES/ENT: No visual changes;  No vertigo or throat pain   NECK: No pain or stiffness  RESPIRATORY: No cough, wheezing, hemoptysis; No shortness of breath  CARDIOVASCULAR: No chest pain or palpitations  GENITOURINARY: No dysuria, frequency or hematuria  NEUROLOGICAL: No numbness or weakness  SKIN: No itching, burning, rashes, or lesions   All other review of systems is negative unless indicated above.    Vital Signs Last 24 Hrs  T(C): 36.3 (18 Jan 2021 09:25), Max: 37 (17 Jan 2021 15:41)  T(F): 97.4 (18 Jan 2021 09:25), Max: 98.6 (17 Jan 2021 15:41)  HR: 57 (18 Jan 2021 09:25) (57 - 114)  BP: 112/64 (18 Jan 2021 09:25) (112/64 - 141/90)  BP(mean): 77 (17 Jan 2021 23:02) (77 - 104)  RR: 18 (18 Jan 2021 09:25) (18 - 20)  SpO2: 100% (18 Jan 2021 09:25) (96% - 100%)    PHYSICAL EXAM:    Constitutional: NAD, well-developed  HEENT: EOMI, throat clear  Neck: No LAD, supple  Respiratory: CTA and P  Cardiovascular: S1 and S2, RRR, no M  Gastrointestinal: BS+, soft, epig and mid abd/ND, neg HSM,  Extremities: No peripheral edema, neg clubing, cyanosis  Vascular: 2+ peripheral pulses  Neurological: A/O x 3, no focal deficits  Psychiatric: Normal mood, normal affect  Skin: No rashes    LABS:  CBC Full  -  ( 17 Jan 2021 16:22 )  WBC Count : 14.77 K/uL  RBC Count : 5.00 M/uL  Hemoglobin : 14.2 g/dL  Hematocrit : 42.5 %  Platelet Count - Automated : 482 K/uL  Mean Cell Volume : 85.0 fl  Mean Cell Hemoglobin : 28.4 pg  Mean Cell Hemoglobin Concentration : 33.4 gm/dL  Auto Neutrophil # : 12.24 K/uL  Auto Lymphocyte # : 1.68 K/uL  Auto Monocyte # : 0.71 K/uL  Auto Eosinophil # : 0.04 K/uL  Auto Basophil # : 0.05 K/uL  Auto Neutrophil % : 82.9 %  Auto Lymphocyte % : 11.4 %  Auto Monocyte % : 4.8 %  Auto Eosinophil % : 0.3 %  Auto Basophil % : 0.3 %    01-17    138  |  105  |  18  ----------------------------<  97  4.1   |  29  |  1.20    Ca    9.8      17 Jan 2021 16:22    TPro  8.6<H>  /  Alb  4.1  /  TBili  1.2  /  DBili  x   /  AST  124<H>  /  ALT  199<H>  /  AlkPhos  196<H>  01-17    PT/INR - ( 17 Jan 2021 16:22 )   PT: 13.6 sec;   INR: 1.17 ratio         PTT - ( 17 Jan 2021 16:22 )  PTT:32.6 sec        RADIOLOGY & ADDITIONAL STUDIES:  < from: CT Abdomen and Pelvis w/ IV Cont (01.17.21 @ 17:07) >  EXAM:  CT ABDOMEN AND PELVIS IC                            PROCEDURE DATE:  01/17/2021          INTERPRETATION:  CT ABDOMEN AND PELVIS WITH IV CONTRAST    CLINICAL INFORMATION: severe abdominal pain in upper abdomen.    COMPARISON: CT abdomen and pelvis 1/5/2021    PROCEDURE:  CT of the Abdomen and Pelvis was performed with intravenous contrast.  Intravenous contrast: 90 ml Omnipaque 350. 10 ml discarded.  Oral contrast: None.  Sagittal and coronal reformats were performed.    FINDINGS:  LOWER CHEST: Clear.    LIVER: Normal.  BILE DUCTS: Mild intra and extrahepatic biliary dilatation.  GALLBLADDER: Interval cholecystectomy. Mild postoperative fluid in the gallbladder fossa without drainable collection.  SPLEEN: Normal.  PANCREAS: Normal.  ADRENALS: Normal.  KIDNEYS/URETERS: No hydronephrosis, or renal mass. Nonobstructing right renal calculi.    BLADDER: Diffuse wall thickening.  REPRODUCTIVE ORGANS: Nonenlarged.    BOWEL: No bowel-related abnormality. Specifically, no evidence of acutediverticulitis. Normal appendix and ileocecal region. No bowel obstruction or bowel inflammation.  PERITONEUM: No free air or ascites.  VESSELS:  Normal caliber aorta.  RETROPERITONEUM/LYMPH NODES: No adenopathy.  ABDOMINAL WALL: Normal.  BONES: No acute bony abnormality.    IMPRESSION:  *  Increased mild intrahepatic and extrahepatic biliary dilatation. Correlate with LFTs to determine the clinical significance.  *  Diffuse bladder wall thickening. Correlate for UTI.            MIGDALIA JIMENEZ MD; Attending Radiologist  This document has been electronically signed. Jan 17 2021  5:18PM    < end of copied text >

## 2021-01-18 NOTE — PROGRESS NOTE ADULT - ASSESSMENT
See previous GI and surgery notes.     MRI read as negative although he does have dilation and elevated LFT's post choly with RUQ pain. Will perform EUS, if positive will perform ERCP. If negative will not as we will then have two negative imaging modalities despite high clinical suspicion.

## 2021-01-18 NOTE — PATIENT PROFILE ADULT - STATED REASON FOR ADMISSION
Pt had gallbladder removed on 1/6/21 and went home on Thursday am.  Started having pain to abd 2 days ago and  severe pain today and came to ER.

## 2021-01-19 LAB
CULTURE RESULTS: NO GROWTH — SIGNIFICANT CHANGE UP
SPECIMEN SOURCE: SIGNIFICANT CHANGE UP

## 2021-01-21 ENCOUNTER — APPOINTMENT (OUTPATIENT)
Dept: SURGERY | Facility: CLINIC | Age: 31
End: 2021-01-21

## 2021-01-21 VITALS
SYSTOLIC BLOOD PRESSURE: 128 MMHG | HEIGHT: 69 IN | OXYGEN SATURATION: 99 % | DIASTOLIC BLOOD PRESSURE: 84 MMHG | BODY MASS INDEX: 25.92 KG/M2 | HEART RATE: 82 BPM | TEMPERATURE: 97.7 F | WEIGHT: 175 LBS

## 2021-01-21 DIAGNOSIS — Z98.890 OTHER SPECIFIED POSTPROCEDURAL STATES: ICD-10-CM

## 2021-01-21 NOTE — ASSESSMENT
[FreeTextEntry1] : S/P lap cholecystectomy, EUS R/o CBD stones, doing well\par Follow up LFTS\par Resume activity\par Path discussed\par Follow up with GI.

## 2021-01-21 NOTE — HISTORY OF PRESENT ILLNESS
[de-identified] : Doing well, pain well controlled, re admitted for UTI, elevated LFTS, EUS negative, no CBD stone, anomalous. CBD insertion, no obstruction. no fever, tolerating diet.

## 2021-01-23 LAB
CULTURE RESULTS: SIGNIFICANT CHANGE UP
CULTURE RESULTS: SIGNIFICANT CHANGE UP
SPECIMEN SOURCE: SIGNIFICANT CHANGE UP
SPECIMEN SOURCE: SIGNIFICANT CHANGE UP

## 2021-01-25 DIAGNOSIS — F41.9 ANXIETY DISORDER, UNSPECIFIED: ICD-10-CM

## 2021-01-25 DIAGNOSIS — R79.89 OTHER SPECIFIED ABNORMAL FINDINGS OF BLOOD CHEMISTRY: ICD-10-CM

## 2021-01-25 DIAGNOSIS — K83.8 OTHER SPECIFIED DISEASES OF BILIARY TRACT: ICD-10-CM

## 2021-01-25 DIAGNOSIS — F90.9 ATTENTION-DEFICIT HYPERACTIVITY DISORDER, UNSPECIFIED TYPE: ICD-10-CM

## 2021-08-13 ENCOUNTER — EMERGENCY (EMERGENCY)
Facility: HOSPITAL | Age: 31
LOS: 0 days | Discharge: HOME | End: 2021-08-13
Attending: EMERGENCY MEDICINE
Payer: COMMERCIAL

## 2021-08-13 VITALS
OXYGEN SATURATION: 100 % | SYSTOLIC BLOOD PRESSURE: 144 MMHG | TEMPERATURE: 98 F | DIASTOLIC BLOOD PRESSURE: 84 MMHG | RESPIRATION RATE: 18 BRPM | HEART RATE: 77 BPM

## 2021-08-13 VITALS — HEIGHT: 70 IN | WEIGHT: 190.04 LBS

## 2021-08-13 DIAGNOSIS — F41.9 ANXIETY DISORDER, UNSPECIFIED: ICD-10-CM

## 2021-08-13 DIAGNOSIS — Z90.49 ACQUIRED ABSENCE OF OTHER SPECIFIED PARTS OF DIGESTIVE TRACT: Chronic | ICD-10-CM

## 2021-08-13 DIAGNOSIS — R11.2 NAUSEA WITH VOMITING, UNSPECIFIED: ICD-10-CM

## 2021-08-13 DIAGNOSIS — N39.0 URINARY TRACT INFECTION, SITE NOT SPECIFIED: ICD-10-CM

## 2021-08-13 DIAGNOSIS — Z20.822 CONTACT WITH AND (SUSPECTED) EXPOSURE TO COVID-19: ICD-10-CM

## 2021-08-13 DIAGNOSIS — R10.9 UNSPECIFIED ABDOMINAL PAIN: ICD-10-CM

## 2021-08-13 DIAGNOSIS — Z90.49 ACQUIRED ABSENCE OF OTHER SPECIFIED PARTS OF DIGESTIVE TRACT: ICD-10-CM

## 2021-08-13 DIAGNOSIS — Z87.19 PERSONAL HISTORY OF OTHER DISEASES OF THE DIGESTIVE SYSTEM: ICD-10-CM

## 2021-08-13 DIAGNOSIS — Z87.11 PERSONAL HISTORY OF PEPTIC ULCER DISEASE: ICD-10-CM

## 2021-08-13 DIAGNOSIS — Z91.018 ALLERGY TO OTHER FOODS: ICD-10-CM

## 2021-08-13 DIAGNOSIS — F90.9 ATTENTION-DEFICIT HYPERACTIVITY DISORDER, UNSPECIFIED TYPE: ICD-10-CM

## 2021-08-13 DIAGNOSIS — Z88.8 ALLERGY STATUS TO OTHER DRUGS, MEDICAMENTS AND BIOLOGICAL SUBSTANCES STATUS: ICD-10-CM

## 2021-08-13 LAB
ALBUMIN SERPL ELPH-MCNC: 3.8 G/DL — SIGNIFICANT CHANGE UP (ref 3.3–5)
ALP SERPL-CCNC: 66 U/L — SIGNIFICANT CHANGE UP (ref 40–120)
ALT FLD-CCNC: 54 U/L — SIGNIFICANT CHANGE UP (ref 12–78)
ANION GAP SERPL CALC-SCNC: 2 MMOL/L — LOW (ref 5–17)
APPEARANCE UR: CLEAR — SIGNIFICANT CHANGE UP
APTT BLD: 28.6 SEC — SIGNIFICANT CHANGE UP (ref 27.5–35.5)
AST SERPL-CCNC: 100 U/L — HIGH (ref 15–37)
BASOPHILS # BLD AUTO: 0.02 K/UL — SIGNIFICANT CHANGE UP (ref 0–0.2)
BASOPHILS NFR BLD AUTO: 0.2 % — SIGNIFICANT CHANGE UP (ref 0–2)
BILIRUB SERPL-MCNC: 0.3 MG/DL — SIGNIFICANT CHANGE UP (ref 0.2–1.2)
BILIRUB UR-MCNC: NEGATIVE — SIGNIFICANT CHANGE UP
BUN SERPL-MCNC: 29 MG/DL — HIGH (ref 7–23)
CALCIUM SERPL-MCNC: 8.1 MG/DL — LOW (ref 8.5–10.1)
CHLORIDE SERPL-SCNC: 108 MMOL/L — SIGNIFICANT CHANGE UP (ref 96–108)
CO2 SERPL-SCNC: 29 MMOL/L — SIGNIFICANT CHANGE UP (ref 22–31)
COLOR SPEC: YELLOW — SIGNIFICANT CHANGE UP
CREAT SERPL-MCNC: 1 MG/DL — SIGNIFICANT CHANGE UP (ref 0.5–1.3)
DIFF PNL FLD: ABNORMAL
EOSINOPHIL # BLD AUTO: 0.07 K/UL — SIGNIFICANT CHANGE UP (ref 0–0.5)
EOSINOPHIL NFR BLD AUTO: 0.7 % — SIGNIFICANT CHANGE UP (ref 0–6)
GLUCOSE SERPL-MCNC: 92 MG/DL — SIGNIFICANT CHANGE UP (ref 70–99)
GLUCOSE UR QL: NEGATIVE MG/DL — SIGNIFICANT CHANGE UP
HCT VFR BLD CALC: 45.6 % — SIGNIFICANT CHANGE UP (ref 39–50)
HGB BLD-MCNC: 15 G/DL — SIGNIFICANT CHANGE UP (ref 13–17)
IMM GRANULOCYTES NFR BLD AUTO: 0.4 % — SIGNIFICANT CHANGE UP (ref 0–1.5)
INR BLD: 1.11 RATIO — SIGNIFICANT CHANGE UP (ref 0.88–1.16)
KETONES UR-MCNC: NEGATIVE — SIGNIFICANT CHANGE UP
LEUKOCYTE ESTERASE UR-ACNC: ABNORMAL
LIDOCAIN IGE QN: 106 U/L — SIGNIFICANT CHANGE UP (ref 73–393)
LYMPHOCYTES # BLD AUTO: 2.16 K/UL — SIGNIFICANT CHANGE UP (ref 1–3.3)
LYMPHOCYTES # BLD AUTO: 20.5 % — SIGNIFICANT CHANGE UP (ref 13–44)
MCHC RBC-ENTMCNC: 28.1 PG — SIGNIFICANT CHANGE UP (ref 27–34)
MCHC RBC-ENTMCNC: 32.9 GM/DL — SIGNIFICANT CHANGE UP (ref 32–36)
MCV RBC AUTO: 85.6 FL — SIGNIFICANT CHANGE UP (ref 80–100)
MONOCYTES # BLD AUTO: 0.93 K/UL — HIGH (ref 0–0.9)
MONOCYTES NFR BLD AUTO: 8.8 % — SIGNIFICANT CHANGE UP (ref 2–14)
NEUTROPHILS # BLD AUTO: 7.32 K/UL — SIGNIFICANT CHANGE UP (ref 1.8–7.4)
NEUTROPHILS NFR BLD AUTO: 69.4 % — SIGNIFICANT CHANGE UP (ref 43–77)
NITRITE UR-MCNC: POSITIVE
PH UR: 7 — SIGNIFICANT CHANGE UP (ref 5–8)
PLATELET # BLD AUTO: 334 K/UL — SIGNIFICANT CHANGE UP (ref 150–400)
POTASSIUM SERPL-MCNC: 3.8 MMOL/L — SIGNIFICANT CHANGE UP (ref 3.5–5.3)
POTASSIUM SERPL-SCNC: 3.8 MMOL/L — SIGNIFICANT CHANGE UP (ref 3.5–5.3)
PROT SERPL-MCNC: 7.6 GM/DL — SIGNIFICANT CHANGE UP (ref 6–8.3)
PROT UR-MCNC: 30 MG/DL
PROTHROM AB SERPL-ACNC: 12.8 SEC — SIGNIFICANT CHANGE UP (ref 10.6–13.6)
RBC # BLD: 5.33 M/UL — SIGNIFICANT CHANGE UP (ref 4.2–5.8)
RBC # FLD: 12.9 % — SIGNIFICANT CHANGE UP (ref 10.3–14.5)
SARS-COV-2 RNA SPEC QL NAA+PROBE: SIGNIFICANT CHANGE UP
SODIUM SERPL-SCNC: 139 MMOL/L — SIGNIFICANT CHANGE UP (ref 135–145)
SP GR SPEC: 1.01 — SIGNIFICANT CHANGE UP (ref 1.01–1.02)
UROBILINOGEN FLD QL: NEGATIVE MG/DL — SIGNIFICANT CHANGE UP
WBC # BLD: 10.54 K/UL — HIGH (ref 3.8–10.5)
WBC # FLD AUTO: 10.54 K/UL — HIGH (ref 3.8–10.5)

## 2021-08-13 PROCEDURE — 36415 COLL VENOUS BLD VENIPUNCTURE: CPT

## 2021-08-13 PROCEDURE — 87591 N.GONORRHOEAE DNA AMP PROB: CPT

## 2021-08-13 PROCEDURE — 99284 EMERGENCY DEPT VISIT MOD MDM: CPT | Mod: 25

## 2021-08-13 PROCEDURE — 80053 COMPREHEN METABOLIC PANEL: CPT

## 2021-08-13 PROCEDURE — 85610 PROTHROMBIN TIME: CPT

## 2021-08-13 PROCEDURE — 81001 URINALYSIS AUTO W/SCOPE: CPT

## 2021-08-13 PROCEDURE — 74177 CT ABD & PELVIS W/CONTRAST: CPT | Mod: 26,MA

## 2021-08-13 PROCEDURE — 96374 THER/PROPH/DIAG INJ IV PUSH: CPT | Mod: XU

## 2021-08-13 PROCEDURE — 85730 THROMBOPLASTIN TIME PARTIAL: CPT

## 2021-08-13 PROCEDURE — 99285 EMERGENCY DEPT VISIT HI MDM: CPT

## 2021-08-13 PROCEDURE — 83690 ASSAY OF LIPASE: CPT

## 2021-08-13 PROCEDURE — 86901 BLOOD TYPING SEROLOGIC RH(D): CPT

## 2021-08-13 PROCEDURE — 85025 COMPLETE CBC W/AUTO DIFF WBC: CPT

## 2021-08-13 PROCEDURE — 86850 RBC ANTIBODY SCREEN: CPT

## 2021-08-13 PROCEDURE — 74177 CT ABD & PELVIS W/CONTRAST: CPT

## 2021-08-13 PROCEDURE — 96375 TX/PRO/DX INJ NEW DRUG ADDON: CPT

## 2021-08-13 PROCEDURE — U0005: CPT

## 2021-08-13 PROCEDURE — 87086 URINE CULTURE/COLONY COUNT: CPT

## 2021-08-13 PROCEDURE — U0003: CPT

## 2021-08-13 PROCEDURE — 87491 CHLMYD TRACH DNA AMP PROBE: CPT

## 2021-08-13 PROCEDURE — 86900 BLOOD TYPING SEROLOGIC ABO: CPT

## 2021-08-13 RX ORDER — FAMOTIDINE 10 MG/ML
20 INJECTION INTRAVENOUS ONCE
Refills: 0 | Status: COMPLETED | OUTPATIENT
Start: 2021-08-13 | End: 2021-08-13

## 2021-08-13 RX ORDER — ONDANSETRON 8 MG/1
4 TABLET, FILM COATED ORAL ONCE
Refills: 0 | Status: COMPLETED | OUTPATIENT
Start: 2021-08-13 | End: 2021-08-13

## 2021-08-13 RX ORDER — CEFTRIAXONE 500 MG/1
1000 INJECTION, POWDER, FOR SOLUTION INTRAMUSCULAR; INTRAVENOUS ONCE
Refills: 0 | Status: COMPLETED | OUTPATIENT
Start: 2021-08-13 | End: 2021-08-13

## 2021-08-13 RX ORDER — KETOROLAC TROMETHAMINE 30 MG/ML
30 SYRINGE (ML) INJECTION ONCE
Refills: 0 | Status: DISCONTINUED | OUTPATIENT
Start: 2021-08-13 | End: 2021-08-13

## 2021-08-13 RX ORDER — MORPHINE SULFATE 50 MG/1
4 CAPSULE, EXTENDED RELEASE ORAL ONCE
Refills: 0 | Status: DISCONTINUED | OUTPATIENT
Start: 2021-08-13 | End: 2021-08-13

## 2021-08-13 RX ORDER — SODIUM CHLORIDE 9 MG/ML
1000 INJECTION INTRAMUSCULAR; INTRAVENOUS; SUBCUTANEOUS ONCE
Refills: 0 | Status: COMPLETED | OUTPATIENT
Start: 2021-08-13 | End: 2021-08-13

## 2021-08-13 RX ORDER — CEPHALEXIN 500 MG
1 CAPSULE ORAL
Qty: 14 | Refills: 0
Start: 2021-08-13 | End: 2021-08-19

## 2021-08-13 RX ADMIN — FAMOTIDINE 20 MILLIGRAM(S): 10 INJECTION INTRAVENOUS at 12:23

## 2021-08-13 RX ADMIN — ONDANSETRON 4 MILLIGRAM(S): 8 TABLET, FILM COATED ORAL at 12:23

## 2021-08-13 RX ADMIN — MORPHINE SULFATE 4 MILLIGRAM(S): 50 CAPSULE, EXTENDED RELEASE ORAL at 12:23

## 2021-08-13 RX ADMIN — CEFTRIAXONE 1000 MILLIGRAM(S): 500 INJECTION, POWDER, FOR SOLUTION INTRAMUSCULAR; INTRAVENOUS at 15:05

## 2021-08-13 RX ADMIN — Medication 30 MILLIGRAM(S): at 13:47

## 2021-08-13 RX ADMIN — SODIUM CHLORIDE 1000 MILLILITER(S): 9 INJECTION INTRAMUSCULAR; INTRAVENOUS; SUBCUTANEOUS at 12:23

## 2021-08-13 NOTE — ED ADULT TRIAGE NOTE - CHIEF COMPLAINT QUOTE
Patient presents to ED with abdominal pain x 24 hours. Denies fever, N/V/D. Pt reports having gallbladder out several months ago and compares this pain to that. Aleve taken PTA with no relief.

## 2021-08-13 NOTE — ED ADULT NURSE NOTE - NS ED NURSE DC PT EDUCATION RESOURCES
ALN: doing ok.  Having sinus pressure, allergy s/sx.  OTC meds reviewed.  Denies bleeding or cramping.  Normal pregnancy changes reviewed.      Reviewed 5Ps; pt and family have hx of drug/EtOH abuse.  Pt is clean/sober for 11 years now.  Has support-sponsor/meeting; declines additional services.      Due to see YURY for level2 US in 4 weeks.  Declines genetic testing. [place] [facility] [place].     Yes

## 2021-08-13 NOTE — ED STATDOCS - OBJECTIVE STATEMENT
31 y/o M with a PMHx of esophagitis, stomach ulcer, ADHD, anxiety, s/p rafa presents to the ED c/o constant abd pain x 1 day. Pt reports he had a cholecystectomy several months ago. Took Alleve PTA with no relief. Denies diarrhea. Reports he has some nausea and vomiting secondary to the abd pain. Denies hematuria.

## 2021-08-13 NOTE — ED STATDOCS - PROGRESS NOTE DETAILS
29 y/o M with PMH of esophagitis, peptic ulcer, cholecystectomy presetns with abdominal pain x 24 hours. no change with aleve at home. Describes pain as stabbing. +nausea w/out vomiting. Denies fever, chills, diarrhea, urinary symptoms. PE: Uncomfortable appearing. Cardiac: s1s2, RRR. lungs: CTAB. Abdomen: NBS x4, soft, +periumbilical and epigastric tenderness to palpation. A/P; r/o pancreatitis, appendicitis. Plan for labs, analgesia, IVF, CTAP, reassess. - Murali Keating Pa-C CT reports concern for cystitis. UA pending. Pt states he has low suspicion for possible STDs, is agreeable to testing to r/o possibility. - Murali Keating PA-C

## 2021-08-13 NOTE — ED STATDOCS - PATIENT PORTAL LINK FT
You can access the FollowMyHealth Patient Portal offered by [facility] by registering at the following website: [email or web address]. By joining [facility]’s FollowMyHealth portal, you will also be able to view your health information using other applications (apps) compatible with our system.

## 2021-08-13 NOTE — ED STATDOCS - CARE PROVIDER_API CALL
Young Daniel)  Urology  [address], [address] [place] [address]  Phone: [phone]  Fax: [phone]  Follow Up Time:

## 2021-08-15 LAB
CULTURE RESULTS: SIGNIFICANT CHANGE UP
SPECIMEN SOURCE: SIGNIFICANT CHANGE UP

## 2021-08-25 ENCOUNTER — APPOINTMENT (OUTPATIENT)
Dept: UROLOGY | Facility: CLINIC | Age: 31
End: 2021-08-25
Payer: COMMERCIAL

## 2021-08-25 VITALS
SYSTOLIC BLOOD PRESSURE: 130 MMHG | HEART RATE: 70 BPM | WEIGHT: 190 LBS | BODY MASS INDEX: 28.14 KG/M2 | DIASTOLIC BLOOD PRESSURE: 88 MMHG | HEIGHT: 69 IN | OXYGEN SATURATION: 99 %

## 2021-08-25 DIAGNOSIS — Z72.0 TOBACCO USE: ICD-10-CM

## 2021-08-25 DIAGNOSIS — Z78.9 OTHER SPECIFIED HEALTH STATUS: ICD-10-CM

## 2021-08-25 LAB
BILIRUB UR QL STRIP: NORMAL
GLUCOSE UR-MCNC: NORMAL
HCG UR QL: 0.2 EU/DL
HGB UR QL STRIP.AUTO: NORMAL
KETONES UR-MCNC: NORMAL
LEUKOCYTE ESTERASE UR QL STRIP: NORMAL
NITRITE UR QL STRIP: NORMAL
PH UR STRIP: 6
PROT UR STRIP-MCNC: NORMAL
SP GR UR STRIP: >=1.03

## 2021-08-25 PROCEDURE — 99203 OFFICE O/P NEW LOW 30 MIN: CPT

## 2021-08-25 NOTE — HISTORY OF PRESENT ILLNESS
[FreeTextEntry1] : This patient was seen recently in the emergency room with some lower tract symptoms.  CAT scan showed what appeared to be cystitis and he was placed on antibiotics which is one reason other he did not take.  He notes irritative symptoms.  On questioning he had a complicated course with a cholecystectomy approximately 9 months ago.  He had a catheter at that time and states much of his urinary complaints emanates from that period of time.

## 2021-08-25 NOTE — PHYSICAL EXAM
[General Appearance - Well Developed] : well developed [General Appearance - Well Nourished] : well nourished [Normal Appearance] : normal appearance [Well Groomed] : well groomed [General Appearance - In No Acute Distress] : no acute distress [Edema] : no peripheral edema [Respiration, Rhythm And Depth] : normal respiratory rhythm and effort [Exaggerated Use Of Accessory Muscles For Inspiration] : no accessory muscle use [Abdomen Soft] : soft [Abdomen Tenderness] : non-tender [Costovertebral Angle Tenderness] : no ~M costovertebral angle tenderness [Urethral Meatus] : meatus normal [Urinary Bladder Findings] : the bladder was normal on palpation [Scrotum] : the scrotum was normal [Testes Mass (___cm)] : there were no testicular masses [No Prostate Nodules] : no prostate nodules [FreeTextEntry1] : The prostate is palpably benign and moderate in size [Normal Station and Gait] : the gait and station were normal for the patient's age [] : no rash [No Focal Deficits] : no focal deficits [Oriented To Time, Place, And Person] : oriented to person, place, and time [Affect] : the affect was normal [Mood] : the mood was normal [Not Anxious] : not anxious [No Palpable Adenopathy] : no palpable adenopathy

## 2021-08-25 NOTE — REVIEW OF SYSTEMS
[Heartburn] : heartburn [Urine Infection (bladder/kidney)] : bladder/kidney infection [see HPI] : see HPI [Negative] : Gastrointestinal [FreeTextEntry2] : Nausea

## 2021-08-25 NOTE — END OF VISIT
[FreeTextEntry3] : A 24-hour urine for protein was ordered and he will take a course of doxycycline.  Urine is sent for an STD testing as well.  He will follow-up in 2 weeks with cystoscopy

## 2021-08-25 NOTE — ASSESSMENT
[FreeTextEntry1] : Voiding issues which seem to relate to his catheterization some months ago.  It is possible he has developed a stricture.  Proteinuria was also noted on his analysis today

## 2021-08-26 LAB
APPEARANCE: CLEAR
BACTERIA: NEGATIVE
BILIRUBIN URINE: NEGATIVE
BLOOD URINE: NEGATIVE
C TRACH RRNA SPEC QL NAA+PROBE: NOT DETECTED
CALCIUM OXALATE CRYSTALS: ABNORMAL
COLOR: YELLOW
GLUCOSE QUALITATIVE U: NEGATIVE
HYALINE CASTS: 0 /LPF
KETONES URINE: NEGATIVE
LEUKOCYTE ESTERASE URINE: NEGATIVE
MICROSCOPIC-UA: NORMAL
N GONORRHOEA RRNA SPEC QL NAA+PROBE: NOT DETECTED
NITRITE URINE: NEGATIVE
PH URINE: 6
PROTEIN URINE: ABNORMAL
RED BLOOD CELLS URINE: 5 /HPF
SOURCE AMPLIFICATION: NORMAL
SPECIFIC GRAVITY URINE: 1.04
SQUAMOUS EPITHELIAL CELLS: 1 /HPF
UROBILINOGEN URINE: NORMAL
WHITE BLOOD CELLS URINE: 3 /HPF

## 2021-08-27 DIAGNOSIS — R80.9 PROTEINURIA, UNSPECIFIED: ICD-10-CM

## 2021-08-27 LAB
BACTERIA UR CULT: NORMAL
URINE CYTOLOGY: NORMAL

## 2021-08-31 ENCOUNTER — NON-APPOINTMENT (OUTPATIENT)
Age: 31
End: 2021-08-31

## 2021-09-09 ENCOUNTER — NON-APPOINTMENT (OUTPATIENT)
Age: 31
End: 2021-09-09

## 2021-09-13 ENCOUNTER — APPOINTMENT (OUTPATIENT)
Dept: UROLOGY | Facility: CLINIC | Age: 31
End: 2021-09-13
Payer: COMMERCIAL

## 2021-09-13 ENCOUNTER — NON-APPOINTMENT (OUTPATIENT)
Age: 31
End: 2021-09-13

## 2021-09-13 PROCEDURE — 52000 CYSTOURETHROSCOPY: CPT

## 2021-09-14 ENCOUNTER — NON-APPOINTMENT (OUTPATIENT)
Age: 31
End: 2021-09-14

## 2021-09-28 ENCOUNTER — NON-APPOINTMENT (OUTPATIENT)
Age: 31
End: 2021-09-28

## 2021-10-05 ENCOUNTER — RX CHANGE (OUTPATIENT)
Age: 31
End: 2021-10-05

## 2021-10-07 ENCOUNTER — RESULT REVIEW (OUTPATIENT)
Age: 31
End: 2021-10-07

## 2021-10-07 ENCOUNTER — OUTPATIENT (OUTPATIENT)
Dept: OUTPATIENT SERVICES | Facility: HOSPITAL | Age: 31
LOS: 1 days | End: 2021-10-07
Payer: COMMERCIAL

## 2021-10-07 VITALS
HEIGHT: 70 IN | OXYGEN SATURATION: 100 % | TEMPERATURE: 98 F | SYSTOLIC BLOOD PRESSURE: 138 MMHG | WEIGHT: 186.95 LBS | DIASTOLIC BLOOD PRESSURE: 70 MMHG | HEART RATE: 63 BPM | RESPIRATION RATE: 16 BRPM

## 2021-10-07 DIAGNOSIS — Z90.49 ACQUIRED ABSENCE OF OTHER SPECIFIED PARTS OF DIGESTIVE TRACT: Chronic | ICD-10-CM

## 2021-10-07 DIAGNOSIS — Z98.890 OTHER SPECIFIED POSTPROCEDURAL STATES: Chronic | ICD-10-CM

## 2021-10-07 DIAGNOSIS — R31.29 OTHER MICROSCOPIC HEMATURIA: ICD-10-CM

## 2021-10-07 DIAGNOSIS — Z01.818 ENCOUNTER FOR OTHER PREPROCEDURAL EXAMINATION: ICD-10-CM

## 2021-10-07 LAB
ANION GAP SERPL CALC-SCNC: 6 MMOL/L — SIGNIFICANT CHANGE UP (ref 5–17)
APPEARANCE UR: CLEAR — SIGNIFICANT CHANGE UP
APTT BLD: 30.2 SEC — SIGNIFICANT CHANGE UP (ref 27.5–35.5)
BASOPHILS # BLD AUTO: 0.02 K/UL — SIGNIFICANT CHANGE UP (ref 0–0.2)
BASOPHILS NFR BLD AUTO: 0.3 % — SIGNIFICANT CHANGE UP (ref 0–2)
BILIRUB UR-MCNC: NEGATIVE — SIGNIFICANT CHANGE UP
BUN SERPL-MCNC: 16 MG/DL — SIGNIFICANT CHANGE UP (ref 7–23)
CALCIUM SERPL-MCNC: 8.7 MG/DL — SIGNIFICANT CHANGE UP (ref 8.5–10.1)
CHLORIDE SERPL-SCNC: 107 MMOL/L — SIGNIFICANT CHANGE UP (ref 96–108)
CO2 SERPL-SCNC: 27 MMOL/L — SIGNIFICANT CHANGE UP (ref 22–31)
COLOR SPEC: YELLOW — SIGNIFICANT CHANGE UP
CREAT SERPL-MCNC: 1.08 MG/DL — SIGNIFICANT CHANGE UP (ref 0.5–1.3)
DIFF PNL FLD: NEGATIVE — SIGNIFICANT CHANGE UP
EOSINOPHIL # BLD AUTO: 0.15 K/UL — SIGNIFICANT CHANGE UP (ref 0–0.5)
EOSINOPHIL NFR BLD AUTO: 2 % — SIGNIFICANT CHANGE UP (ref 0–6)
GLUCOSE SERPL-MCNC: 122 MG/DL — HIGH (ref 70–99)
GLUCOSE UR QL: NEGATIVE MG/DL — SIGNIFICANT CHANGE UP
HCT VFR BLD CALC: 37.6 % — LOW (ref 39–50)
HGB BLD-MCNC: 12.3 G/DL — LOW (ref 13–17)
IMM GRANULOCYTES NFR BLD AUTO: 0.3 % — SIGNIFICANT CHANGE UP (ref 0–1.5)
INR BLD: 1.05 RATIO — SIGNIFICANT CHANGE UP (ref 0.88–1.16)
KETONES UR-MCNC: ABNORMAL
LEUKOCYTE ESTERASE UR-ACNC: NEGATIVE — SIGNIFICANT CHANGE UP
LYMPHOCYTES # BLD AUTO: 2.15 K/UL — SIGNIFICANT CHANGE UP (ref 1–3.3)
LYMPHOCYTES # BLD AUTO: 28.6 % — SIGNIFICANT CHANGE UP (ref 13–44)
MCHC RBC-ENTMCNC: 27.9 PG — SIGNIFICANT CHANGE UP (ref 27–34)
MCHC RBC-ENTMCNC: 32.7 GM/DL — SIGNIFICANT CHANGE UP (ref 32–36)
MCV RBC AUTO: 85.3 FL — SIGNIFICANT CHANGE UP (ref 80–100)
MONOCYTES # BLD AUTO: 0.57 K/UL — SIGNIFICANT CHANGE UP (ref 0–0.9)
MONOCYTES NFR BLD AUTO: 7.6 % — SIGNIFICANT CHANGE UP (ref 2–14)
NEUTROPHILS # BLD AUTO: 4.6 K/UL — SIGNIFICANT CHANGE UP (ref 1.8–7.4)
NEUTROPHILS NFR BLD AUTO: 61.2 % — SIGNIFICANT CHANGE UP (ref 43–77)
NITRITE UR-MCNC: NEGATIVE — SIGNIFICANT CHANGE UP
PH UR: 5 — SIGNIFICANT CHANGE UP (ref 5–8)
PLATELET # BLD AUTO: 367 K/UL — SIGNIFICANT CHANGE UP (ref 150–400)
POTASSIUM SERPL-MCNC: 3.8 MMOL/L — SIGNIFICANT CHANGE UP (ref 3.5–5.3)
POTASSIUM SERPL-SCNC: 3.8 MMOL/L — SIGNIFICANT CHANGE UP (ref 3.5–5.3)
PROT UR-MCNC: 30 MG/DL
PROTHROM AB SERPL-ACNC: 12.1 SEC — SIGNIFICANT CHANGE UP (ref 10.6–13.6)
RBC # BLD: 4.41 M/UL — SIGNIFICANT CHANGE UP (ref 4.2–5.8)
RBC # FLD: 14.3 % — SIGNIFICANT CHANGE UP (ref 10.3–14.5)
SODIUM SERPL-SCNC: 140 MMOL/L — SIGNIFICANT CHANGE UP (ref 135–145)
SP GR SPEC: 1.02 — SIGNIFICANT CHANGE UP (ref 1.01–1.02)
UROBILINOGEN FLD QL: 1 MG/DL
WBC # BLD: 7.51 K/UL — SIGNIFICANT CHANGE UP (ref 3.8–10.5)
WBC # FLD AUTO: 7.51 K/UL — SIGNIFICANT CHANGE UP (ref 3.8–10.5)

## 2021-10-07 PROCEDURE — 85025 COMPLETE CBC W/AUTO DIFF WBC: CPT

## 2021-10-07 PROCEDURE — 93005 ELECTROCARDIOGRAM TRACING: CPT

## 2021-10-07 PROCEDURE — 86901 BLOOD TYPING SEROLOGIC RH(D): CPT

## 2021-10-07 PROCEDURE — 71046 X-RAY EXAM CHEST 2 VIEWS: CPT

## 2021-10-07 PROCEDURE — 93010 ELECTROCARDIOGRAM REPORT: CPT

## 2021-10-07 PROCEDURE — 87086 URINE CULTURE/COLONY COUNT: CPT

## 2021-10-07 PROCEDURE — 86900 BLOOD TYPING SEROLOGIC ABO: CPT

## 2021-10-07 PROCEDURE — 85730 THROMBOPLASTIN TIME PARTIAL: CPT

## 2021-10-07 PROCEDURE — 80048 BASIC METABOLIC PNL TOTAL CA: CPT

## 2021-10-07 PROCEDURE — 81001 URINALYSIS AUTO W/SCOPE: CPT

## 2021-10-07 PROCEDURE — G0463: CPT | Mod: 25

## 2021-10-07 PROCEDURE — 71046 X-RAY EXAM CHEST 2 VIEWS: CPT | Mod: 26

## 2021-10-07 PROCEDURE — 86850 RBC ANTIBODY SCREEN: CPT

## 2021-10-07 PROCEDURE — 36415 COLL VENOUS BLD VENIPUNCTURE: CPT

## 2021-10-07 PROCEDURE — 85610 PROTHROMBIN TIME: CPT

## 2021-10-07 NOTE — H&P PST ADULT - NSICDXPASTMEDICALHX_GEN_ALL_CORE_FT
PAST MEDICAL HISTORY:  ADHD     Adult acne     Anxiety     Attention deficit hyperkinetic disorder     COVID-19 vaccine series completed Pfizer. 2nd dose "May or June, 2021"    Esophagitis     H/O esophageal ulcer     Hematuria microscopic    Insomnia     Stomach ulcer

## 2021-10-07 NOTE — H&P PST ADULT - HISTORY OF PRESENT ILLNESS
30 years old male with hematuria. UTI, bladder infection since cholecystectomy with catheter 1/ 2021. Several antibiotic courses since. Had a CT scan which indicated cystitis. Dysuria. Urinary frequency and urgency. Planned cystoscopy, transurethral resection of bladder tumor.

## 2021-10-07 NOTE — H&P PST ADULT - NSICDXFAMILYHX_GEN_ALL_CORE_FT
FAMILY HISTORY:  Father  Still living? No  Family history of renal cancer, Age at diagnosis: Age Unknown

## 2021-10-07 NOTE — H&P PST ADULT - NSANTHOSAYNRD_GEN_A_CORE
No. MORENO screening performed.  STOP BANG Legend: 0-2 = LOW Risk; 3-4 = INTERMEDIATE Risk; 5-8 = HIGH Risk

## 2021-10-07 NOTE — H&P PST ADULT - ASSESSMENT
30 years old male present to PST prior to cystoscopy, transurethral resection of bladder tumor with Dr. Collier.    Plan   1. NPO after midnight  2. Take the following medications with sips of water on the day of procedure: Clonazepam  3. Drink a quart of extra  fluids the day before your surgery.  4. CBC, BMP, PT/ INR and PTT, Urinalysis, Urine Culture, Type and Screen sent to lab  5. EKG and Chest x- ray done  6. To Schedule Covid swab for 10/15/2021

## 2021-10-08 DIAGNOSIS — Z01.818 ENCOUNTER FOR OTHER PREPROCEDURAL EXAMINATION: ICD-10-CM

## 2021-10-08 DIAGNOSIS — R31.29 OTHER MICROSCOPIC HEMATURIA: ICD-10-CM

## 2021-10-08 LAB
CULTURE RESULTS: SIGNIFICANT CHANGE UP
SPECIMEN SOURCE: SIGNIFICANT CHANGE UP

## 2021-10-15 ENCOUNTER — LABORATORY RESULT (OUTPATIENT)
Age: 31
End: 2021-10-15

## 2021-10-15 ENCOUNTER — APPOINTMENT (OUTPATIENT)
Dept: DISASTER EMERGENCY | Facility: CLINIC | Age: 31
End: 2021-10-15

## 2021-10-15 PROBLEM — R31.9 HEMATURIA, UNSPECIFIED: Chronic | Status: ACTIVE | Noted: 2021-10-07

## 2021-10-15 PROBLEM — Z92.29 PERSONAL HISTORY OF OTHER DRUG THERAPY: Chronic | Status: ACTIVE | Noted: 2021-10-07

## 2021-10-15 PROBLEM — L70.9 ACNE, UNSPECIFIED: Chronic | Status: ACTIVE | Noted: 2021-10-07

## 2021-10-15 PROBLEM — K21.9 GASTRO-ESOPHAGEAL REFLUX DISEASE WITHOUT ESOPHAGITIS: Chronic | Status: ACTIVE | Noted: 2021-10-07

## 2021-10-15 PROBLEM — G47.00 INSOMNIA, UNSPECIFIED: Chronic | Status: ACTIVE | Noted: 2021-10-07

## 2021-10-18 ENCOUNTER — RESULT REVIEW (OUTPATIENT)
Age: 31
End: 2021-10-18

## 2021-10-18 ENCOUNTER — APPOINTMENT (OUTPATIENT)
Dept: UROLOGY | Facility: HOSPITAL | Age: 31
End: 2021-10-18

## 2021-10-18 ENCOUNTER — OUTPATIENT (OUTPATIENT)
Dept: INPATIENT UNIT | Facility: HOSPITAL | Age: 31
LOS: 1 days | Discharge: HOME | End: 2021-10-18
Payer: COMMERCIAL

## 2021-10-18 VITALS
HEART RATE: 54 BPM | DIASTOLIC BLOOD PRESSURE: 68 MMHG | OXYGEN SATURATION: 100 % | SYSTOLIC BLOOD PRESSURE: 116 MMHG | RESPIRATION RATE: 16 BRPM | TEMPERATURE: 97 F

## 2021-10-18 VITALS
HEART RATE: 78 BPM | RESPIRATION RATE: 16 BRPM | DIASTOLIC BLOOD PRESSURE: 66 MMHG | WEIGHT: 186.95 LBS | TEMPERATURE: 98 F | OXYGEN SATURATION: 100 % | HEIGHT: 70 IN | SYSTOLIC BLOOD PRESSURE: 108 MMHG

## 2021-10-18 DIAGNOSIS — Z98.890 OTHER SPECIFIED POSTPROCEDURAL STATES: Chronic | ICD-10-CM

## 2021-10-18 DIAGNOSIS — Z90.49 ACQUIRED ABSENCE OF OTHER SPECIFIED PARTS OF DIGESTIVE TRACT: Chronic | ICD-10-CM

## 2021-10-18 DIAGNOSIS — R31.29 OTHER MICROSCOPIC HEMATURIA: ICD-10-CM

## 2021-10-18 PROCEDURE — 88305 TISSUE EXAM BY PATHOLOGIST: CPT | Mod: 26

## 2021-10-18 PROCEDURE — 52235 CYSTOSCOPY AND TREATMENT: CPT

## 2021-10-18 PROCEDURE — 88305 TISSUE EXAM BY PATHOLOGIST: CPT

## 2021-10-18 RX ORDER — ONDANSETRON 8 MG/1
4 TABLET, FILM COATED ORAL ONCE
Refills: 0 | Status: DISCONTINUED | OUTPATIENT
Start: 2021-10-18 | End: 2021-10-18

## 2021-10-18 RX ORDER — PHENAZOPYRIDINE HCL 100 MG
1 TABLET ORAL
Qty: 9 | Refills: 0
Start: 2021-10-18 | End: 2021-10-20

## 2021-10-18 RX ORDER — HYDROMORPHONE HYDROCHLORIDE 2 MG/ML
0.5 INJECTION INTRAMUSCULAR; INTRAVENOUS; SUBCUTANEOUS
Refills: 0 | Status: DISCONTINUED | OUTPATIENT
Start: 2021-10-18 | End: 2021-10-18

## 2021-10-18 RX ORDER — FENTANYL CITRATE 50 UG/ML
50 INJECTION INTRAVENOUS
Refills: 0 | Status: DISCONTINUED | OUTPATIENT
Start: 2021-10-18 | End: 2021-10-18

## 2021-10-18 RX ORDER — ACETAMINOPHEN 500 MG
1000 TABLET ORAL ONCE
Refills: 0 | Status: COMPLETED | OUTPATIENT
Start: 2021-10-18 | End: 2021-10-18

## 2021-10-18 RX ORDER — OXYCODONE HYDROCHLORIDE 5 MG/1
5 TABLET ORAL ONCE
Refills: 0 | Status: DISCONTINUED | OUTPATIENT
Start: 2021-10-18 | End: 2021-10-18

## 2021-10-18 RX ORDER — SODIUM CHLORIDE 9 MG/ML
1000 INJECTION, SOLUTION INTRAVENOUS
Refills: 0 | Status: DISCONTINUED | OUTPATIENT
Start: 2021-10-18 | End: 2021-10-18

## 2021-10-18 RX ORDER — CLONAZEPAM 1 MG
1 TABLET ORAL
Qty: 0 | Refills: 0 | DISCHARGE

## 2021-10-18 RX ORDER — PHENAZOPYRIDINE HCL 100 MG
200 TABLET ORAL ONCE
Refills: 0 | Status: COMPLETED | OUTPATIENT
Start: 2021-10-18 | End: 2021-10-18

## 2021-10-18 RX ADMIN — Medication 1000 MILLIGRAM(S): at 14:40

## 2021-10-18 RX ADMIN — Medication 200 MILLIGRAM(S): at 14:35

## 2021-10-18 RX ADMIN — Medication 400 MILLIGRAM(S): at 14:35

## 2021-10-18 NOTE — BRIEF OPERATIVE NOTE - OPERATION/FINDINGS
1. High bladder neck   2. Generalized erythema of bladder mucosa, more pronounced along posterior bladder wall.

## 2021-10-18 NOTE — BRIEF OPERATIVE NOTE - NSICDXBRIEFPOSTOP_GEN_ALL_CORE_FT
POST-OP DIAGNOSIS:  Gross hematuria 18-Oct-2021 14:14:30  Ray Collier  Lesion of bladder 18-Oct-2021 14:14:51  Ray Collier

## 2021-10-18 NOTE — ASU DISCHARGE PLAN (ADULT/PEDIATRIC) - CARE PROVIDER_API CALL
Ray Coliler)  Urology  [address], [address] [place] [address]  Phone: [phone]  Fax: [phone]  Scheduled Appointment: 10/22/2021 09:10 AM

## 2021-10-18 NOTE — BRIEF OPERATIVE NOTE - NSICDXBRIEFPREOP_GEN_ALL_CORE_FT
Physical Therapy    Facility/Department: [facility]      NAME: Marlee Mendez  : 1947  MRN: [id number]    Date of Service: 2021    Evaluating Therapist: Kassandra Green DPT    ROOM: 6160/8056-D  DIAGNOSIS: History of revision of total knee arthroplasty   PMH: Pt had elective surgery for revision of left total knee arthroplasty with explantation of previous femoral and tibial component and revision of both the femoral and entire tibial component on 21. PMH includes DJD, HLD, Essential HTN, OA, Anxiety, Chronic Back Pain, Liver Disease, Obesity, Tricuspid Regurgitation    PRECAUTIONS: TTWB LLE, L Hinged Knee Brace (0-30 AROM per VO (Dr. Alicia Close 21)    Social:  Pt lives with spouse in a [facility] home with 3 stairs to enter with single rail (R HR). Equipment Owned: SPC and [employer]. Pt ambulated with no device independently PTA. Initial Evaluation  22 A. M. Short Term Goals Long Term Goals    Was pt agreeable to Eval/treatment? Yes yes      Does pt have pain?  L knee pain 6/10 L knee pain 5/10      Bed Mobility  Rolling: SBA  Supine to sit: Rachel (LLE support)  Sit to supine: Rachel (LLE support)  Scooting: SBA N/T  Sup Mod I   Transfers Sit to stand: Rachel  Stand to sit: cgA  Stand pivot: cgA/Rachel with bariatric ww Sit to stand CGA  Stand to sit CGA  Stand pivot CG/Rachel with bariatric ww  Sup with AAD Mod I with AAD   Ambulation   15 feet with bariatric ww with cgA/Rachel 12 feet x2 reps and 22 feet 1 rep with bariatric ww CGA  50 feet with AAD with Sup 150 feet with AAD with Mod I   Walking 10 feet on uneven surface NT N/T      Wheel Chair Mobility 15 feet with B UE and SBA 45 feet x1 with B UEs SBA  >150 feet with B UE with Sup  >300 feet with B UE with Mod I   Car Transfers NT N/T  Sup with AAD Mod I with AAD   Stair negotiation: ascended and descended                     NT N/T  2 steps with B rail with Sup 4 steps with 1 rail and AAD with Mod I   Curb Step:   ascended and descended 2 inch step with // bars and cg/Rachel (ascending BW) N/T  4 inch step with AAD and Sup 4 inch step with AAD and Mod I   Picking up object off the floor NT       BLE ROM WFL       BLE Strength R LE 4/5  L LE: grossly  3-/5 (hip & knee),   3+/5 (ankle) (limited by pain)       Balance  Sitting: Independent  Standing: cgA with ww Standing CGA      Date Family Teach Completed TBD       Is additional Family Teaching Needed? Y or N Y       Hindering Progress L LE weakness, Balance, TTWB, Stairs Balance weakness      PT recommended ELOS 2 weeks       Team's Discharge Plan        Therapist at Team Meeting          Pt is alert and Oriented x4  Sensation: Pt denies any numbness or tingling  Edema: None  Endurance: Fair +  Skin was inspected: Ace wrap in place, noted SHEA dressing       ASSESSMENT  Pt displays functional ability as noted in the objective portion of this evaluation. Comments:  Pt was sitting in w/c with L LE on leg rest upon entering room and agreed to tx session. Pt performed amb with bariatric ww and CGA. Pt able to maintain TTWB throughout. Working on increasing amb distance as pt fatigues quickly during amb. Pt's pulse ox on RA during rest and activities 94%. Pt agreed to cont to sit up in chair at end of tx session. Therapeutic Exercises:  4# R LE 2x15 LAQs, 3x10 hip flexion 4#. Patient education  Pt educated on TTWB, hinge brace 0-30, safety with mobility, transfers, exercises to promote blood flow/DVT prevention, and role of PT    Patient response to education:   Pt verbalized understanding Pt demonstrated skill Pt requires further education in this area   Yes Yes Yes     Rehab potential is Good for reaching above PT goals. PLAN  PT care will be provided in accordance with the objectives noted above. Whenever appropriate, clear delegation orders will be provided for nursing staff.   Exercises and functional mobility practice will be used as well as appropriate assistive devices or modalities to obtain goals. Patient and family education will also be administered as needed. Frequency of treatments will be 2x/day M-F and 1x/day Sat or Sun x  14 days.     Time in: 8:20  Time out: 8:50    Carla [id number] PRE-OP DIAGNOSIS:  Gross hematuria 18-Oct-2021 14:14:23  Ray Collier

## 2021-10-18 NOTE — BRIEF OPERATIVE NOTE - NSICDXBRIEFPROCEDURE_GEN_ALL_CORE_FT
PROCEDURES:  Cystoscopy, with bladder neoplasm excision, 2.0 cm to 5.0 cm in diameter 18-Oct-2021 14:15:11  Ray Collier S

## 2021-10-22 ENCOUNTER — APPOINTMENT (OUTPATIENT)
Dept: UROLOGY | Facility: CLINIC | Age: 31
End: 2021-10-22
Payer: COMMERCIAL

## 2021-10-22 PROCEDURE — 99213 OFFICE O/P EST LOW 20 MIN: CPT

## 2021-10-25 DIAGNOSIS — F41.9 ANXIETY DISORDER, UNSPECIFIED: ICD-10-CM

## 2021-10-25 DIAGNOSIS — R31.0 GROSS HEMATURIA: ICD-10-CM

## 2021-10-25 DIAGNOSIS — Z90.49 ACQUIRED ABSENCE OF OTHER SPECIFIED PARTS OF DIGESTIVE TRACT: ICD-10-CM

## 2021-10-25 DIAGNOSIS — F90.9 ATTENTION-DEFICIT HYPERACTIVITY DISORDER, UNSPECIFIED TYPE: ICD-10-CM

## 2021-10-25 DIAGNOSIS — N30.91 CYSTITIS, UNSPECIFIED WITH HEMATURIA: ICD-10-CM

## 2021-10-25 DIAGNOSIS — F17.210 NICOTINE DEPENDENCE, CIGARETTES, UNCOMPLICATED: ICD-10-CM

## 2021-11-01 NOTE — HISTORY OF PRESENT ILLNESS
[FreeTextEntry1] : 31 year old male presents for follow up. \par Initially had bother with urination now back to baseline. \par Still bothered by dysuria, urgency and frequency. \par \par Status post Cystoscopy and bladder biopsy with fulguration done on 10/18/21 at [facility]. \par \par Mentions was fine before Jan 2021 hospitalization. \par Has lot of stress.

## 2021-11-01 NOTE — ASSESSMENT
[FreeTextEntry1] : Dysuria:\par Hematuria:\par Discussed Pathology. Discussed concern for Interstitial cystitis. \par \par Recommended to see Dr Tinoco.

## 2022-02-08 NOTE — PROGRESS NOTE ADULT - SUBJECTIVE AND OBJECTIVE BOX
CC:Patient is a 30y old  Male who presents with a chief complaint of post op elevation LFTs (17 Jan 2021 19:23)      Subjective:  Pt seen and examined at bedside. patient reports pain control.  Patient denies  fever, chills, chest pain, SOB, abd pain, N/V/D, extremity pain or dysfunction, hemoptysis, hematemesis, hematuria, hematochexia, headache, diplopia, vertigo, dizzyness.    ROS:      Vital Signs Last 24 Hrs  T(C): 36.6 (17 Jan 2021 23:44), Max: 37 (17 Jan 2021 15:41)  T(F): 97.9 (17 Jan 2021 23:44), Max: 98.6 (17 Jan 2021 15:41)  HR: 74 (17 Jan 2021 23:44) (74 - 114)  BP: 115/76 (17 Jan 2021 23:44) (115/76 - 141/90)  BP(mean): 77 (17 Jan 2021 23:02) (77 - 104)  RR: 18 (17 Jan 2021 23:44) (18 - 20)  SpO2: 100% (17 Jan 2021 23:44) (96% - 100%)    Labs:                                14.2   14.77 )-----------( 482      ( 17 Jan 2021 16:22 )             42.5     CBC Full  -  ( 17 Jan 2021 16:22 )  WBC Count : 14.77 K/uL  RBC Count : 5.00 M/uL  Hemoglobin : 14.2 g/dL  Hematocrit : 42.5 %  Platelet Count - Automated : 482 K/uL  Mean Cell Volume : 85.0 fl  Mean Cell Hemoglobin : 28.4 pg  Mean Cell Hemoglobin Concentration : 33.4 gm/dL  Auto Neutrophil # : 12.24 K/uL  Auto Lymphocyte # : 1.68 K/uL  Auto Monocyte # : 0.71 K/uL  Auto Eosinophil # : 0.04 K/uL  Auto Basophil # : 0.05 K/uL  Auto Neutrophil % : 82.9 %  Auto Lymphocyte % : 11.4 %  Auto Monocyte % : 4.8 %  Auto Eosinophil % : 0.3 %  Auto Basophil % : 0.3 %    01-17    138  |  105  |  18  ----------------------------<  97  4.1   |  29  |  1.20    Ca    9.8      17 Jan 2021 16:22    TPro  8.6<H>  /  Alb  4.1  /  TBili  1.2  /  DBili  x   /  AST  124<H>  /  ALT  199<H>  /  AlkPhos  196<H>  01-17    LIVER FUNCTIONS - ( 17 Jan 2021 16:22 )  Alb: 4.1 g/dL / Pro: 8.6 gm/dL / ALK PHOS: 196 U/L / ALT: 199 U/L / AST: 124 U/L / GGT: x           PT/INR - ( 17 Jan 2021 16:22 )   PT: 13.6 sec;   INR: 1.17 ratio         PTT - ( 17 Jan 2021 16:22 )  PTT:32.6 sec      Meds:  cefTRIAXone Injectable. 1000 milliGRAM(s) IV Push every 12 hours  HYDROmorphone  Injectable 1 milliGRAM(s) IV Push every 4 hours PRN  metroNIDAZOLE  IVPB 500 milliGRAM(s) IV Intermittent every 8 hours  nicotine -   7 mG/24Hr(s) Patch 1 patch Transdermal daily  sodium chloride 0.9%. 1000 milliLiter(s) IV Continuous <Continuous>      Radiology:      Physical exam:  Pt is aaox3  Pt in no acute distress  Psych: normal affect  Resp: CTAB  CVS: S1S2(+)  ABD: bowel sounds (+), soft, non distended, no rebound, no guarding, no rigidity TTP  EXT: no calf tenderness or edema to exam b/l, on VTE prophylaxis  Skin: no adverse skin changes to exam       (2) cough or sneeze

## 2022-04-06 ENCOUNTER — APPOINTMENT (OUTPATIENT)
Dept: ORTHOPEDIC SURGERY | Facility: CLINIC | Age: 32
End: 2022-04-06

## 2022-06-08 NOTE — ASU DISCHARGE PLAN (ADULT/PEDIATRIC) - FREQUENT HAND WASHING PREVENTS THE SPREAD OF INFECTION.
Statement Selected
I have personally seen, examined and participated in the care of this patient. I have reviewed all pertinent clinical information, including history, physical exam, plan and the Medical/PA/NP Student’s note and agree except as noted.

## 2022-08-24 ENCOUNTER — EMERGENCY (EMERGENCY)
Facility: HOSPITAL | Age: 32
LOS: 0 days | Discharge: HOME | End: 2022-08-24
Attending: EMERGENCY MEDICINE
Payer: COMMERCIAL

## 2022-08-24 VITALS
RESPIRATION RATE: 18 BRPM | HEIGHT: 70 IN | DIASTOLIC BLOOD PRESSURE: 82 MMHG | OXYGEN SATURATION: 98 % | TEMPERATURE: 99 F | HEART RATE: 87 BPM | WEIGHT: 195.99 LBS | SYSTOLIC BLOOD PRESSURE: 137 MMHG

## 2022-08-24 VITALS
HEART RATE: 66 BPM | SYSTOLIC BLOOD PRESSURE: 139 MMHG | DIASTOLIC BLOOD PRESSURE: 70 MMHG | TEMPERATURE: 98 F | OXYGEN SATURATION: 99 % | RESPIRATION RATE: 16 BRPM

## 2022-08-24 DIAGNOSIS — Z90.49 ACQUIRED ABSENCE OF OTHER SPECIFIED PARTS OF DIGESTIVE TRACT: ICD-10-CM

## 2022-08-24 DIAGNOSIS — Z90.49 ACQUIRED ABSENCE OF OTHER SPECIFIED PARTS OF DIGESTIVE TRACT: Chronic | ICD-10-CM

## 2022-08-24 DIAGNOSIS — Z20.822 CONTACT WITH AND (SUSPECTED) EXPOSURE TO COVID-19: ICD-10-CM

## 2022-08-24 DIAGNOSIS — R19.7 DIARRHEA, UNSPECIFIED: ICD-10-CM

## 2022-08-24 DIAGNOSIS — G47.00 INSOMNIA, UNSPECIFIED: ICD-10-CM

## 2022-08-24 DIAGNOSIS — R10.10 UPPER ABDOMINAL PAIN, UNSPECIFIED: ICD-10-CM

## 2022-08-24 DIAGNOSIS — Z87.11 PERSONAL HISTORY OF PEPTIC ULCER DISEASE: ICD-10-CM

## 2022-08-24 DIAGNOSIS — R11.0 NAUSEA: ICD-10-CM

## 2022-08-24 DIAGNOSIS — Z88.8 ALLERGY STATUS TO OTHER DRUGS, MEDICAMENTS AND BIOLOGICAL SUBSTANCES STATUS: ICD-10-CM

## 2022-08-24 DIAGNOSIS — Z87.19 PERSONAL HISTORY OF OTHER DISEASES OF THE DIGESTIVE SYSTEM: ICD-10-CM

## 2022-08-24 DIAGNOSIS — Z98.890 OTHER SPECIFIED POSTPROCEDURAL STATES: Chronic | ICD-10-CM

## 2022-08-24 DIAGNOSIS — K21.9 GASTRO-ESOPHAGEAL REFLUX DISEASE WITHOUT ESOPHAGITIS: ICD-10-CM

## 2022-08-24 DIAGNOSIS — F90.9 ATTENTION-DEFICIT HYPERACTIVITY DISORDER, UNSPECIFIED TYPE: ICD-10-CM

## 2022-08-24 LAB
ALBUMIN SERPL ELPH-MCNC: 3.8 G/DL — SIGNIFICANT CHANGE UP (ref 3.3–5)
ALP SERPL-CCNC: 65 U/L — SIGNIFICANT CHANGE UP (ref 40–120)
ALT FLD-CCNC: 38 U/L — SIGNIFICANT CHANGE UP (ref 12–78)
ANION GAP SERPL CALC-SCNC: 5 MMOL/L — SIGNIFICANT CHANGE UP (ref 5–17)
APPEARANCE UR: CLEAR — SIGNIFICANT CHANGE UP
APTT BLD: 29.2 SEC — SIGNIFICANT CHANGE UP (ref 27.5–35.5)
AST SERPL-CCNC: 19 U/L — SIGNIFICANT CHANGE UP (ref 15–37)
BASOPHILS # BLD AUTO: 0.02 K/UL — SIGNIFICANT CHANGE UP (ref 0–0.2)
BASOPHILS NFR BLD AUTO: 0.2 % — SIGNIFICANT CHANGE UP (ref 0–2)
BILIRUB SERPL-MCNC: 0.3 MG/DL — SIGNIFICANT CHANGE UP (ref 0.2–1.2)
BILIRUB UR-MCNC: NEGATIVE — SIGNIFICANT CHANGE UP
BUN SERPL-MCNC: 16 MG/DL — SIGNIFICANT CHANGE UP (ref 7–23)
CALCIUM SERPL-MCNC: 8.8 MG/DL — SIGNIFICANT CHANGE UP (ref 8.5–10.1)
CHLORIDE SERPL-SCNC: 108 MMOL/L — SIGNIFICANT CHANGE UP (ref 96–108)
CO2 SERPL-SCNC: 25 MMOL/L — SIGNIFICANT CHANGE UP (ref 22–31)
COLOR SPEC: YELLOW — SIGNIFICANT CHANGE UP
CREAT SERPL-MCNC: 0.94 MG/DL — SIGNIFICANT CHANGE UP (ref 0.5–1.3)
DIFF PNL FLD: ABNORMAL
EGFR: 112 ML/MIN/1.73M2 — SIGNIFICANT CHANGE UP
EOSINOPHIL # BLD AUTO: 0.03 K/UL — SIGNIFICANT CHANGE UP (ref 0–0.5)
EOSINOPHIL NFR BLD AUTO: 0.3 % — SIGNIFICANT CHANGE UP (ref 0–6)
FLUAV AG NPH QL: SIGNIFICANT CHANGE UP
FLUBV AG NPH QL: SIGNIFICANT CHANGE UP
GLUCOSE SERPL-MCNC: 93 MG/DL — SIGNIFICANT CHANGE UP (ref 70–99)
GLUCOSE UR QL: NEGATIVE — SIGNIFICANT CHANGE UP
HCT VFR BLD CALC: 37 % — LOW (ref 39–50)
HGB BLD-MCNC: 12.5 G/DL — LOW (ref 13–17)
IMM GRANULOCYTES NFR BLD AUTO: 0.3 % — SIGNIFICANT CHANGE UP (ref 0–1.5)
INR BLD: 1.21 RATIO — HIGH (ref 0.88–1.16)
KETONES UR-MCNC: ABNORMAL
LEUKOCYTE ESTERASE UR-ACNC: ABNORMAL
LIDOCAIN IGE QN: 139 U/L — SIGNIFICANT CHANGE UP (ref 73–393)
LYMPHOCYTES # BLD AUTO: 2.39 K/UL — SIGNIFICANT CHANGE UP (ref 1–3.3)
LYMPHOCYTES # BLD AUTO: 25.5 % — SIGNIFICANT CHANGE UP (ref 13–44)
MCHC RBC-ENTMCNC: 28.5 PG — SIGNIFICANT CHANGE UP (ref 27–34)
MCHC RBC-ENTMCNC: 33.8 GM/DL — SIGNIFICANT CHANGE UP (ref 32–36)
MCV RBC AUTO: 84.3 FL — SIGNIFICANT CHANGE UP (ref 80–100)
MONOCYTES # BLD AUTO: 0.63 K/UL — SIGNIFICANT CHANGE UP (ref 0–0.9)
MONOCYTES NFR BLD AUTO: 6.7 % — SIGNIFICANT CHANGE UP (ref 2–14)
NEUTROPHILS # BLD AUTO: 6.28 K/UL — SIGNIFICANT CHANGE UP (ref 1.8–7.4)
NEUTROPHILS NFR BLD AUTO: 67 % — SIGNIFICANT CHANGE UP (ref 43–77)
NITRITE UR-MCNC: NEGATIVE — SIGNIFICANT CHANGE UP
PH UR: 5 — SIGNIFICANT CHANGE UP (ref 5–8)
PLATELET # BLD AUTO: 431 K/UL — HIGH (ref 150–400)
POTASSIUM SERPL-MCNC: 4.1 MMOL/L — SIGNIFICANT CHANGE UP (ref 3.5–5.3)
POTASSIUM SERPL-SCNC: 4.1 MMOL/L — SIGNIFICANT CHANGE UP (ref 3.5–5.3)
PROT SERPL-MCNC: 7.9 GM/DL — SIGNIFICANT CHANGE UP (ref 6–8.3)
PROT UR-MCNC: 100
PROTHROM AB SERPL-ACNC: 14.1 SEC — HIGH (ref 10.5–13.4)
RBC # BLD: 4.39 M/UL — SIGNIFICANT CHANGE UP (ref 4.2–5.8)
RBC # FLD: 13.9 % — SIGNIFICANT CHANGE UP (ref 10.3–14.5)
RSV RNA NPH QL NAA+NON-PROBE: SIGNIFICANT CHANGE UP
SARS-COV-2 RNA SPEC QL NAA+PROBE: SIGNIFICANT CHANGE UP
SODIUM SERPL-SCNC: 138 MMOL/L — SIGNIFICANT CHANGE UP (ref 135–145)
SP GR SPEC: 1.02 — SIGNIFICANT CHANGE UP (ref 1.01–1.02)
UROBILINOGEN FLD QL: 1
WBC # BLD: 9.38 K/UL — SIGNIFICANT CHANGE UP (ref 3.8–10.5)
WBC # FLD AUTO: 9.38 K/UL — SIGNIFICANT CHANGE UP (ref 3.8–10.5)

## 2022-08-24 PROCEDURE — 85025 COMPLETE CBC W/AUTO DIFF WBC: CPT

## 2022-08-24 PROCEDURE — 85730 THROMBOPLASTIN TIME PARTIAL: CPT

## 2022-08-24 PROCEDURE — 99284 EMERGENCY DEPT VISIT MOD MDM: CPT | Mod: 25

## 2022-08-24 PROCEDURE — 74177 CT ABD & PELVIS W/CONTRAST: CPT | Mod: 26,MA

## 2022-08-24 PROCEDURE — 71045 X-RAY EXAM CHEST 1 VIEW: CPT

## 2022-08-24 PROCEDURE — 99285 EMERGENCY DEPT VISIT HI MDM: CPT

## 2022-08-24 PROCEDURE — 83690 ASSAY OF LIPASE: CPT

## 2022-08-24 PROCEDURE — 86900 BLOOD TYPING SEROLOGIC ABO: CPT

## 2022-08-24 PROCEDURE — 87086 URINE CULTURE/COLONY COUNT: CPT

## 2022-08-24 PROCEDURE — 85610 PROTHROMBIN TIME: CPT

## 2022-08-24 PROCEDURE — 96375 TX/PRO/DX INJ NEW DRUG ADDON: CPT

## 2022-08-24 PROCEDURE — 71045 X-RAY EXAM CHEST 1 VIEW: CPT | Mod: 26

## 2022-08-24 PROCEDURE — 74177 CT ABD & PELVIS W/CONTRAST: CPT | Mod: MA

## 2022-08-24 PROCEDURE — 0241U: CPT

## 2022-08-24 PROCEDURE — 36415 COLL VENOUS BLD VENIPUNCTURE: CPT

## 2022-08-24 PROCEDURE — 81001 URINALYSIS AUTO W/SCOPE: CPT

## 2022-08-24 PROCEDURE — 86850 RBC ANTIBODY SCREEN: CPT

## 2022-08-24 PROCEDURE — 80053 COMPREHEN METABOLIC PANEL: CPT

## 2022-08-24 PROCEDURE — 86901 BLOOD TYPING SEROLOGIC RH(D): CPT

## 2022-08-24 PROCEDURE — 96374 THER/PROPH/DIAG INJ IV PUSH: CPT | Mod: XU

## 2022-08-24 RX ORDER — FAMOTIDINE 10 MG/ML
20 INJECTION INTRAVENOUS ONCE
Refills: 0 | Status: COMPLETED | OUTPATIENT
Start: 2022-08-24 | End: 2022-08-24

## 2022-08-24 RX ORDER — MORPHINE SULFATE 50 MG/1
4 CAPSULE, EXTENDED RELEASE ORAL ONCE
Refills: 0 | Status: DISCONTINUED | OUTPATIENT
Start: 2022-08-24 | End: 2022-08-24

## 2022-08-24 RX ORDER — KETOROLAC TROMETHAMINE 30 MG/ML
15 SYRINGE (ML) INJECTION ONCE
Refills: 0 | Status: DISCONTINUED | OUTPATIENT
Start: 2022-08-24 | End: 2022-08-24

## 2022-08-24 RX ORDER — HALOPERIDOL DECANOATE 100 MG/ML
2.5 INJECTION INTRAMUSCULAR ONCE
Refills: 0 | Status: DISCONTINUED | OUTPATIENT
Start: 2022-08-24 | End: 2022-08-24

## 2022-08-24 RX ORDER — ONDANSETRON 8 MG/1
4 TABLET, FILM COATED ORAL ONCE
Refills: 0 | Status: COMPLETED | OUTPATIENT
Start: 2022-08-24 | End: 2022-08-24

## 2022-08-24 RX ORDER — SODIUM CHLORIDE 9 MG/ML
1000 INJECTION INTRAMUSCULAR; INTRAVENOUS; SUBCUTANEOUS ONCE
Refills: 0 | Status: COMPLETED | OUTPATIENT
Start: 2022-08-24 | End: 2022-08-24

## 2022-08-24 RX ADMIN — SODIUM CHLORIDE 2000 MILLILITER(S): 9 INJECTION INTRAMUSCULAR; INTRAVENOUS; SUBCUTANEOUS at 09:47

## 2022-08-24 RX ADMIN — Medication 15 MILLIGRAM(S): at 11:58

## 2022-08-24 RX ADMIN — MORPHINE SULFATE 4 MILLIGRAM(S): 50 CAPSULE, EXTENDED RELEASE ORAL at 09:48

## 2022-08-24 RX ADMIN — ONDANSETRON 4 MILLIGRAM(S): 8 TABLET, FILM COATED ORAL at 09:48

## 2022-08-24 RX ADMIN — Medication 30 MILLILITER(S): at 10:44

## 2022-08-24 RX ADMIN — FAMOTIDINE 20 MILLIGRAM(S): 10 INJECTION INTRAVENOUS at 10:44

## 2022-08-24 NOTE — ED ADULT NURSE NOTE - OBJECTIVE STATEMENT
pt presents to the ED c/o abdominal pain x 4 days with mild nausea and diarrhea. Pt denies fevers, chills, CP, SOB. Pt is awake and alert, following commands appropriately with equal unlabored respirations. Decreased po intake noted. No other complaints.

## 2022-08-24 NOTE — ED ADULT NURSE REASSESSMENT NOTE - NS ED NURSE REASSESS COMMENT FT1
pt states he feels comfortable with d/c at this time. Family member picking up pt. no questions or concerns.

## 2022-08-24 NOTE — ED PROVIDER NOTE - PATIENT PORTAL LINK FT
You can access the FollowMyHealth Patient Portal offered by [facility] by registering at the following website: [email or web address]. By joining [facility]’s FollowMyHealth portal, you will also be able to view your health information using other applications (apps) compatible with our system.

## 2022-08-24 NOTE — ED PROVIDER NOTE - CLINICAL SUMMARY MEDICAL DECISION MAKING FREE TEXT BOX
Pt well appearing.  Notes upper abd pain with prior perforated rafa and c/b ?infection? - not documented as such here.  Would ct to evaluate for abscess formation.  Dispo pending labs, imaging, meds, reassessment.

## 2022-08-24 NOTE — ED PROVIDER NOTE - CARE PROVIDER_API CALL
Be Moore (MD)  Gastroenterology; Internal Medicine  [address] [facility], [address] [place] [address]  Phone: [phone]  Fax: [phone]  Follow Up Time: 1-3 Days

## 2022-08-24 NOTE — ED ADULT NURSE NOTE - CINV DISCH MEDS REVIEWED YN
Call to patient in reference to her mammogram performed on the [facility] on January 11, 2021. Instructed patient that the radiologist has recommended some additional breast imaging, in order to make a final determination/result. Patient requests to go to [facility]- for additional imaging. Advised her results will be faxed to ordering physician, and that office will refer her to [facility] [address]. Verbalizes understanding and is agreeable to proceed.        Klaudia Arce, RN, BSN, [address]
Yes

## 2022-08-24 NOTE — ED PROVIDER NOTE - NSICDXPASTMEDICALHX_GEN_ALL_CORE_FT
PAST MEDICAL HISTORY:  ADHD     Adult acne     Anxiety     Attention deficit hyperkinetic disorder     COVID-19 vaccine series completed Pfizer. 2nd dose "May or June, 2021"    Esophagitis     GERD (gastroesophageal reflux disease)     H/O esophageal ulcer     Hematuria microscopic    Insomnia     Stomach ulcer

## 2022-08-24 NOTE — ED PROVIDER NOTE - OBJECTIVE STATEMENT
31 y M pmh of cholecystitis s/p cholecystectomy 1/2021 presenting with upper abd pain for 4 days similar to prior rafa.  Denies vomiting, but endorses mild nausea and decreased po intake.  No f/c/cough/dysuria/hematuria.  Notes diarrhea for 4 days as well, non-bloody.  no recent travel or atypical food intake.

## 2022-08-24 NOTE — ED PROVIDER NOTE - PHYSICAL EXAMINATION
Constitutional: NAD, well appearing  HEENT: no rhinorrhea  CVS:  RRR, no m/r/g  Resp:  CTAB  GI: soft, moderate TTP to b/l upper quadrants  MSK:  no restriction to rom, full ROM to all extremities  Neuro:  A&Ox3, moving all extremities equally  Skin: no rash  psych: clear thought content  Heme/lymph:  No LAD

## 2022-08-25 LAB
CULTURE RESULTS: SIGNIFICANT CHANGE UP
SPECIMEN SOURCE: SIGNIFICANT CHANGE UP

## 2022-09-02 ENCOUNTER — APPOINTMENT (OUTPATIENT)
Dept: GASTROENTEROLOGY | Facility: CLINIC | Age: 32
End: 2022-09-02

## 2022-09-02 VITALS
WEIGHT: 190 LBS | HEART RATE: 105 BPM | SYSTOLIC BLOOD PRESSURE: 158 MMHG | DIASTOLIC BLOOD PRESSURE: 92 MMHG | HEIGHT: 69 IN | BODY MASS INDEX: 28.14 KG/M2

## 2022-09-02 DIAGNOSIS — R10.13 EPIGASTRIC PAIN: ICD-10-CM

## 2022-09-02 DIAGNOSIS — R11.0 NAUSEA: ICD-10-CM

## 2022-09-02 PROCEDURE — 99203 OFFICE O/P NEW LOW 30 MIN: CPT

## 2022-09-06 PROBLEM — R11.0 NAUSEA: Status: ACTIVE | Noted: 2022-09-06

## 2022-09-06 NOTE — HISTORY OF PRESENT ILLNESS
[de-identified] : Mario Patricio is a 31 year old male presenting today for initial evaluation post hospitalization. Was seen at  for severe epigastric discomfort, states "it feels similar to when I had my gallbladder removed". Had normal CT abdomen and pelvis ruling out abscess, but reports pain persisted. States he also had one day of diarrhea,  but as since resolved. Denies blood in stool, or unintentional weight loss. Reports previous EGD with diagnosis of stomach  ulcer? unsure of when, documentation unavailable. Denies [facility] of colon or gastric cancer.

## 2022-09-06 NOTE — ASSESSMENT
[FreeTextEntry1] : Plan:\par Pt noted to be uncomfortable during exam and interview, unable to stay still repeatedly standing up and sitting down. Pt requesting medications for abdominal pain, recommend hyoscyamine 0.125mg BID, asking for something more effective explained the importance of treating underlying cause for pain rather than just addressing pain. For severe epigastric discomfort and nausea recommend omeprazole 40MG QD and sucralfate 4 times daily for relief of discomfort possibly a/w reflux or PUD. Will also perform EGD to further investigate possible PUD. Risks versus benefits as well as instructions reviewed, pt agrees to planned procedure, discussed with Dr. Dinero. I have spent 40 minutes on this encounter.

## 2022-09-13 ENCOUNTER — APPOINTMENT (OUTPATIENT)
Dept: GASTROENTEROLOGY | Facility: AMBULATORY MEDICAL SERVICES | Age: 32
End: 2022-09-13

## 2022-11-04 ENCOUNTER — APPOINTMENT (OUTPATIENT)
Dept: UROLOGY | Facility: CLINIC | Age: 32
End: 2022-11-04
Payer: COMMERCIAL

## 2022-11-04 VITALS
BODY MASS INDEX: 26.66 KG/M2 | WEIGHT: 180 LBS | HEIGHT: 69 IN | OXYGEN SATURATION: 100 % | SYSTOLIC BLOOD PRESSURE: 142 MMHG | DIASTOLIC BLOOD PRESSURE: 83 MMHG | HEART RATE: 93 BPM

## 2022-11-04 DIAGNOSIS — N20.0 CALCULUS OF KIDNEY: ICD-10-CM

## 2022-11-04 PROCEDURE — 99214 OFFICE O/P EST MOD 30 MIN: CPT

## 2022-11-06 LAB
APPEARANCE: CLEAR
BACTERIA: NEGATIVE
BILIRUBIN URINE: NEGATIVE
BLOOD URINE: ABNORMAL
COLOR: NORMAL
GLUCOSE QUALITATIVE U: NEGATIVE
HYALINE CASTS: 1 /LPF
KETONES URINE: NEGATIVE
LEUKOCYTE ESTERASE URINE: NEGATIVE
MICROSCOPIC-UA: NORMAL
NITRITE URINE: NEGATIVE
PH URINE: 6.5
PROTEIN URINE: ABNORMAL
RED BLOOD CELLS URINE: 34 /HPF
SPECIFIC GRAVITY URINE: 1.02
SQUAMOUS EPITHELIAL CELLS: 0 /HPF
UROBILINOGEN URINE: NORMAL
WHITE BLOOD CELLS URINE: 7 /HPF

## 2022-11-07 NOTE — END OF VISIT
[FreeTextEntry3] : He will take a course of Doxycycline. Urine is sent for culture analysis and cytology. A US of the bladder and kidneys is ordered as well with plans to follow.

## 2022-11-07 NOTE — PHYSICAL EXAM
[General Appearance - Well Developed] : well developed [General Appearance - Well Nourished] : well nourished [Normal Appearance] : normal appearance [Well Groomed] : well groomed [General Appearance - In No Acute Distress] : no acute distress [Abdomen Soft] : soft [Abdomen Tenderness] : non-tender [Costovertebral Angle Tenderness] : no ~M costovertebral angle tenderness [Urethral Meatus] : meatus normal [Urinary Bladder Findings] : the bladder was normal on palpation [Scrotum] : the scrotum was normal [Testes Mass (___cm)] : there were no testicular masses [No Prostate Nodules] : no prostate nodules [Edema] : no peripheral edema [] : no respiratory distress [Respiration, Rhythm And Depth] : normal respiratory rhythm and effort [Exaggerated Use Of Accessory Muscles For Inspiration] : no accessory muscle use [Oriented To Time, Place, And Person] : oriented to person, place, and time [Affect] : the affect was normal [Mood] : the mood was normal [Not Anxious] : not anxious [Normal Station and Gait] : the gait and station were normal for the patient's age [No Focal Deficits] : no focal deficits [No Palpable Adenopathy] : no palpable adenopathy [FreeTextEntry1] : Genital area is normal.

## 2022-11-07 NOTE — HISTORY OF PRESENT ILLNESS
[FreeTextEntry1] : 32M presents today with a CC of urethral and perineal discomfort. Pt has had discomfort in this area before and now notes a recurrence. He has occasional dysuria as well.

## 2022-11-08 LAB
BACTERIA UR CULT: NORMAL
URINE CYTOLOGY: NORMAL

## 2022-11-11 ENCOUNTER — NON-APPOINTMENT (OUTPATIENT)
Age: 32
End: 2022-11-11

## 2022-11-16 ENCOUNTER — OUTPATIENT (OUTPATIENT)
Dept: OUTPATIENT SERVICES | Facility: HOSPITAL | Age: 32
LOS: 1 days | End: 2022-11-16
Payer: COMMERCIAL

## 2022-11-16 ENCOUNTER — APPOINTMENT (OUTPATIENT)
Dept: ULTRASOUND IMAGING | Facility: CLINIC | Age: 32
End: 2022-11-16

## 2022-11-16 DIAGNOSIS — N50.89 OTHER SPECIFIED DISORDERS OF THE MALE GENITAL ORGANS: ICD-10-CM

## 2022-11-16 DIAGNOSIS — Z90.49 ACQUIRED ABSENCE OF OTHER SPECIFIED PARTS OF DIGESTIVE TRACT: Chronic | ICD-10-CM

## 2022-11-16 DIAGNOSIS — Z98.890 OTHER SPECIFIED POSTPROCEDURAL STATES: Chronic | ICD-10-CM

## 2022-11-16 PROCEDURE — 76770 US EXAM ABDO BACK WALL COMP: CPT | Mod: 26

## 2022-11-16 PROCEDURE — 76770 US EXAM ABDO BACK WALL COMP: CPT

## 2022-11-18 ENCOUNTER — NON-APPOINTMENT (OUTPATIENT)
Age: 32
End: 2022-11-18

## 2022-11-18 DIAGNOSIS — N20.0 CALCULUS OF KIDNEY: ICD-10-CM

## 2022-12-20 ENCOUNTER — EMERGENCY (EMERGENCY)
Facility: HOSPITAL | Age: 32
LOS: 0 days | Discharge: HOME | End: 2022-12-21
Attending: EMERGENCY MEDICINE
Payer: COMMERCIAL

## 2022-12-20 VITALS — HEIGHT: 69 IN | WEIGHT: 184.97 LBS

## 2022-12-20 DIAGNOSIS — G47.00 INSOMNIA, UNSPECIFIED: ICD-10-CM

## 2022-12-20 DIAGNOSIS — Z86.19 PERSONAL HISTORY OF OTHER INFECTIOUS AND PARASITIC DISEASES: ICD-10-CM

## 2022-12-20 DIAGNOSIS — R20.2 PARESTHESIA OF SKIN: ICD-10-CM

## 2022-12-20 DIAGNOSIS — Z87.19 PERSONAL HISTORY OF OTHER DISEASES OF THE DIGESTIVE SYSTEM: ICD-10-CM

## 2022-12-20 DIAGNOSIS — Z91.018 ALLERGY TO OTHER FOODS: ICD-10-CM

## 2022-12-20 DIAGNOSIS — F17.200 NICOTINE DEPENDENCE, UNSPECIFIED, UNCOMPLICATED: ICD-10-CM

## 2022-12-20 DIAGNOSIS — F41.9 ANXIETY DISORDER, UNSPECIFIED: ICD-10-CM

## 2022-12-20 DIAGNOSIS — M79.81 NONTRAUMATIC HEMATOMA OF SOFT TISSUE: ICD-10-CM

## 2022-12-20 DIAGNOSIS — Z90.49 ACQUIRED ABSENCE OF OTHER SPECIFIED PARTS OF DIGESTIVE TRACT: ICD-10-CM

## 2022-12-20 DIAGNOSIS — F98.8 OTHER SPECIFIED BEHAVIORAL AND EMOTIONAL DISORDERS WITH ONSET USUALLY OCCURRING IN CHILDHOOD AND ADOLESCENCE: ICD-10-CM

## 2022-12-20 DIAGNOSIS — F90.9 ATTENTION-DEFICIT HYPERACTIVITY DISORDER, UNSPECIFIED TYPE: ICD-10-CM

## 2022-12-20 DIAGNOSIS — Z98.890 OTHER SPECIFIED POSTPROCEDURAL STATES: Chronic | ICD-10-CM

## 2022-12-20 DIAGNOSIS — R22.43 LOCALIZED SWELLING, MASS AND LUMP, LOWER LIMB, BILATERAL: ICD-10-CM

## 2022-12-20 DIAGNOSIS — Z90.49 ACQUIRED ABSENCE OF OTHER SPECIFIED PARTS OF DIGESTIVE TRACT: Chronic | ICD-10-CM

## 2022-12-20 DIAGNOSIS — Z88.8 ALLERGY STATUS TO OTHER DRUGS, MEDICAMENTS AND BIOLOGICAL SUBSTANCES STATUS: ICD-10-CM

## 2022-12-20 PROCEDURE — 99284 EMERGENCY DEPT VISIT MOD MDM: CPT

## 2022-12-20 PROCEDURE — 76882 US LMTD JT/FCL EVL NVASC XTR: CPT | Mod: 26,RT

## 2022-12-20 PROCEDURE — 99284 EMERGENCY DEPT VISIT MOD MDM: CPT | Mod: 25

## 2022-12-20 PROCEDURE — 76882 US LMTD JT/FCL EVL NVASC XTR: CPT | Mod: 50

## 2022-12-20 NOTE — ED STATDOCS - SKIN, MLM
skin normal color for race, warm, dry and intact.  No tactile warmth.  + Mild ecchymotic discoloration L ant mid-thigh.

## 2022-12-20 NOTE — ED STATDOCS - MUSCULOSKELETAL, MLM
B/L ant thighs + focal swelling, somewhat firm but NT, suggestive of + hematoma.  No e/o SQ abscess.  Intact active quads function/ active exension w/o pain but 4+/5 motor B/L quads.   Intact hamstring function.  No post LE swelling/tender/mass.  B/L lower LEs normal motor/sensory exam.

## 2022-12-20 NOTE — ED STATDOCS - OBJECTIVE STATEMENT
32-year-old male, history of sepsis January 2021 due to acute cholecystitis, with subsequent lap rafa, complicated by retained CBD stone requiring ERCP removal, currently on Doxycycline p.o. for acne, ambulatory to ED complaining of 1 month bilateral anterior thigh mild swelling and "numbness sensation".  Patient does not recall specific acute trauma.  Admits that he used to workout but not recently.  Patient denies fever chills cough SOB chest pain.  Bilateral legs feel slightly weak but patient self ambulatory with steady gait. 32-year-old male, history of sepsis January 2021 due to acute cholecystitis, with subsequent lap rafa, complicated by retained CBD stone requiring ERCP removal, currently on Doxycycline p.o. for acne, ambulatory to ED complaining of 1 month bilateral anterior thigh mild swelling and "numbness sensation".  Patient does not recall specific acute trauma.  Admits that he used to workout but not recently.  Patient denies fever chills cough SOB chest pain.  Bilateral legs feel slightly weak but patient reports + self ambulatory with steady gait.

## 2022-12-20 NOTE — ED STATDOCS - PROGRESS NOTE DETAILS
LAYTON Cota MD:  I observed pt self-ambulatory in ED w/ steady gait. C MD Beata:  Pt informed tht u/s studies confirm B/L anterior thigh hematomas.  Pt stable for DC home, outpt Ortho f/u. LAYTON Cota MD:  Pt informed that u/s studies confirm B/L anterior thigh hematomas.  Pt stable for DC home, outpt Ortho f/u.

## 2022-12-20 NOTE — ED ADULT TRIAGE NOTE - CHIEF COMPLAINT QUOTE
pt ambulatory to triage c/o b/l thigh swelling/numbness x1 month. hx of gallbladder removal in 01/2021. allergic to Prozac

## 2022-12-20 NOTE — ED STATDOCS - CARE PROVIDER_API CALL
Prabhakar Alfredo)  Orthopaedic Surgery; Surgery of the Hand  [address], [address]  [place] [address]  Phone: [phone]  Fax: [phone]  Follow Up Time:

## 2022-12-20 NOTE — ED STATDOCS - NSFOLLOWUPINSTRUCTIONS_ED_ALL_ED_FT
Sono studies appear to confirm + hematomas of bilateral anterior thighs.  Avoid heavy lifting.  Avoid running, physical overexertion.  Tylenol 325 mg 2 tabs every 6 hours as needed for pain/discomfort.  Follow up with orthopedics, Dr. Alfredo as per below.        Hematoma      A hematoma is a collection of blood. A hematoma can happen:  •Under the skin.      •In an organ.      •In a body space.      •In a joint space.      •In other tissues.      The blood can thicken (clot) to form a lump that you can see and feel. The lump is often hard and may become sore and tender. The lump can be very small or very big. Most hematomas get better in a few days to weeks. However, some hematomas may be serious and need medical care.      What are the causes?    This condition is caused by:  •An injury.      •Blood that leaks under the skin.      •Problems from surgeries.      •Medical conditions that cause bleeding or bruising.        What increases the risk?    You are more likely to develop this condition if:  •You are an older adult.      •You use medicines that thin your blood.        What are the signs or symptoms?  Comparison of a normal ankle and an ankle that is swollen and bruised.   Symptoms depend on where the hematoma is in your body.•If the hematoma is under the skin, there is:  •A firm lump on the body.       •Pain and tenderness in the area.       •Bruising. The skin above the lump may be blue, dark blue, purple-red, or yellowish.      •If the hematoma is deep in the tissues or body spaces, there may be:  •Blood in the stomach. This may cause pain in the belly (abdomen), weakness, passing out (fainting), and shortness of breath.      •Blood in the head. This may cause a headache, weakness, trouble speaking or understanding speech, or passing out.          How is this diagnosed?    This condition is diagnosed based on:  •Your medical history.       •A physical exam.       •Imaging tests, such as ultrasound or CT scan.      •Blood tests.        How is this treated?    Treatment depends on the cause, size, and location of the hematoma. Treatment may include:  •Doing nothing. Many hematomas go away on their own without treatment.      •Surgery or close monitoring. This may be needed for large hematomas or hematomas that affect the body's organs.      •Medicines. These may be given if a medical condition caused the hematoma.        Follow these instructions at home:      Managing pain, stiffness, and swelling   Bag of ice on a towel on the skin. •If told, put ice on the area.  •Put ice in a plastic bag.      •Place a towel between your skin and the bag.      •Leave the ice on for 20 minutes, 2–3 times a day for the first two days.      •If told, put heat on the affected area after putting ice on the area for two days. Use the heat source that your doctor tells you to use. This could be a moist heat pack or a heating pad. To do this:  •Place a towel between your skin and the heat source.      •Leave the heat on for 20–30 minutes.      •Remove the heat if your skin turns bright red. This is very important if you are unable to feel pain, heat, or cold. You may have a greater risk of getting burned.        •Raise (elevate) the affected area above the level of your heart while you are sitting or lying down.      •Wrap the affected area with an elastic bandage, if told by your doctor. Do not wrap the bandage too tightly.      •If your hematoma is on a leg or foot and is painful, your doctor may give you crutches. Use them as told by your doctor.      General instructions     •Take over-the-counter and prescription medicines only as told by your doctor.      •Keep all follow-up visits as told by your doctor. This is important.        Contact a doctor if:    •You have a fever.      •The swelling or bruising gets worse.      •You start to get more hematomas.        Get help right away if:    •Your pain gets worse.      •Your pain is not getting better with medicine.      •Your skin over the hematoma breaks or starts to bleed.    •Your hematoma is in your chest or belly and you:  •Pass out.      •Feel weak.      •Become short of breath.      •You have a hematoma on your scalp that is caused by a fall or injury, and you:  •Have a headache that gets worse.      •Have trouble speaking or understanding speech.      •Become less alert or you pass out.          Summary    •A hematoma is a collection of blood in any part of your body.      •Most hematomas get better on their own in a few days to weeks. Some may need medical care.      •Follow instructions from your doctor about how to care for your hematoma.      •Contact a doctor if the swelling or bruising gets worse, or if you are short of breath.      This information is not intended to replace advice given to you by your health care provider. Make sure you discuss any questions you have with your health care provider.

## 2022-12-20 NOTE — ED STATDOCS - CLINICAL SUMMARY MEDICAL DECISION MAKING FREE TEXT BOX
33 yo M ambulatory to ED complaining of 1 month bilateral anterior thigh mild swelling and "numbness sensation "w/o known  specific acute trauma. VSS.  On exam, B/L ant thighs + focal swelling, somewhat firm but NT, suggestive of + hematoma.  No e/o SQ abscess.  Intact active quads function/ active extension w/o pain but 4+/5 motor B/L quads.   Intact hamstring function.  No post LE swelling/tender/mass.  B/L lower LEs normal motor/sensory exam.  Plan: Nonvascular u/s B/L anterior thighs, r/o hematoma, m. tear.

## 2022-12-20 NOTE — ED STATDOCS - PATIENT PORTAL LINK FT
You can access the FollowMyHealth Patient Portal offered by [facility] by registering at the following website: [email or web address]. By joining [employer]’s FollowMyHealth portal, you will also be able to view your health information using other applications (apps) compatible with our system.

## 2022-12-21 VITALS
DIASTOLIC BLOOD PRESSURE: 86 MMHG | RESPIRATION RATE: 16 BRPM | OXYGEN SATURATION: 100 % | HEART RATE: 81 BPM | TEMPERATURE: 100 F | SYSTOLIC BLOOD PRESSURE: 149 MMHG

## 2022-12-21 NOTE — ED ADULT NURSE NOTE - NSFALLRSKOUTCOME_ED_ALL_ED
Unable to reach patient by phone.  Letter is re-printed for reduced work hours and mailed to patient.   Universal Safety Interventions

## 2022-12-21 NOTE — ED ADULT NURSE NOTE - NSIMPLEMENTINTERV_GEN_ALL_ED
Implemented All Universal Safety Interventions:  [place] to call system. Call bell, personal items and telephone within reach. Instruct patient to call for assistance. Room bathroom lighting operational. Non-slip footwear when patient is off stretcher. Physically safe environment: no spills, clutter or unnecessary equipment. Stretcher in lowest position, wheels locked, appropriate side rails in place.

## 2023-01-11 NOTE — ASU PREOP CHECKLIST - RESPIRATORY RATE (BREATHS/MIN)
[Well groomed] : well groomed [Average] : average [Cooperative] : cooperative [Euthymic] : euthymic [Full] : full [Clear] : clear [Linear/Goal Directed] : linear/goal directed [None] : none 16 [None Reported] : none reported [WNL] : within normal limits

## 2023-01-13 ENCOUNTER — APPOINTMENT (OUTPATIENT)
Dept: UROLOGY | Facility: CLINIC | Age: 33
End: 2023-01-13
Payer: COMMERCIAL

## 2023-01-13 PROCEDURE — 99214 OFFICE O/P EST MOD 30 MIN: CPT

## 2023-01-17 LAB
APPEARANCE: ABNORMAL
BACTERIA UR CULT: NORMAL
BACTERIA: NEGATIVE
BILIRUBIN URINE: NEGATIVE
BLOOD URINE: ABNORMAL
CALCIUM OXALATE CRYSTALS: ABNORMAL
COLOR: YELLOW
GLUCOSE QUALITATIVE U: NEGATIVE
HYALINE CASTS: 1 /LPF
KETONES URINE: NEGATIVE
LEUKOCYTE ESTERASE URINE: NEGATIVE
MICROSCOPIC-UA: NORMAL
NITRITE URINE: NEGATIVE
PH URINE: 6
PROTEIN URINE: ABNORMAL
RED BLOOD CELLS URINE: 1 /HPF
SPECIFIC GRAVITY URINE: >=1.03
SQUAMOUS EPITHELIAL CELLS: 0 /HPF
UROBILINOGEN URINE: NORMAL
WHITE BLOOD CELLS URINE: 4 /HPF

## 2023-01-22 NOTE — HISTORY OF PRESENT ILLNESS
[FreeTextEntry1] : 32 year old male presents for dysuria. \par Saw Dr Bradford for dysuria and frequency. \par Had ultrasound. \par Continues to have dysuria, frequency and gross hematuria with clots.\par Also complaining of pain after sexual activity. \par \par Status post Cystoscopy and bladder biopsy with fulguration on 10/18/21 at [facility]. \par Pathology: Inflammation, granulation and reactive changes. \par

## 2023-01-22 NOTE — PHYSICAL EXAM
[Urethral Meatus] : meatus normal [Penis Abnormality] : normal circumcised penis [Scrotum] : the scrotum was normal [Epididymis] : the epididymides were normal [Testes Tenderness] : no tenderness of the testes [Testes Mass (___cm)] : there were no testicular masses [No Prostate Nodules] : no prostate nodules [Prostate Size ___ (0-4)] : prostate size [unfilled] (scale: 0-4) [Prostate Tenderness] : was tender [Normal Appearance] : normal appearance [General Appearance - In No Acute Distress] : no acute distress [Abdomen Soft] : soft [Abdomen Tenderness] : non-tender [] : no respiratory distress [Oriented To Time, Place, And Person] : oriented to person, place, and time [Normal Station and Gait] : the gait and station were normal for the patient's age

## 2023-01-22 NOTE — ASSESSMENT
[FreeTextEntry1] : Reviewed records.\par Discussed labs and imaging. \par \par Prostatitis:\par Will get Urinalysis and Urine culture.\par Start Ciprofloxacin. Discussed FDA black box warning. \par Recommended to abstain from sex for 2 weeks. \par \par Gross hematuria:\par Kidney stone:\par 7 mm non obstructing right kidney stone. \par Discussed treatment options. Will consider ESWL. \par Recommended to do X ray of Kidney, Ureter and Bladder after 2 days of Milk of Magnesia. \par \par Return to office in 4 weeks or sooner if any issues.

## 2023-01-24 ENCOUNTER — EMERGENCY (EMERGENCY)
Facility: HOSPITAL | Age: 33
LOS: 0 days | Discharge: HOME | End: 2023-01-24
Attending: EMERGENCY MEDICINE
Payer: COMMERCIAL

## 2023-01-24 VITALS — HEIGHT: 70 IN | WEIGHT: 184.97 LBS

## 2023-01-24 VITALS
RESPIRATION RATE: 16 BRPM | HEART RATE: 88 BPM | TEMPERATURE: 98 F | DIASTOLIC BLOOD PRESSURE: 88 MMHG | SYSTOLIC BLOOD PRESSURE: 120 MMHG | OXYGEN SATURATION: 98 %

## 2023-01-24 DIAGNOSIS — Z98.890 OTHER SPECIFIED POSTPROCEDURAL STATES: Chronic | ICD-10-CM

## 2023-01-24 DIAGNOSIS — Z90.49 ACQUIRED ABSENCE OF OTHER SPECIFIED PARTS OF DIGESTIVE TRACT: Chronic | ICD-10-CM

## 2023-01-24 LAB
ALBUMIN SERPL ELPH-MCNC: 4 G/DL — SIGNIFICANT CHANGE UP (ref 3.3–5)
ALP SERPL-CCNC: 60 U/L — SIGNIFICANT CHANGE UP (ref 40–120)
ALT FLD-CCNC: 22 U/L — SIGNIFICANT CHANGE UP (ref 12–78)
ANION GAP SERPL CALC-SCNC: 3 MMOL/L — LOW (ref 5–17)
AST SERPL-CCNC: 13 U/L — LOW (ref 15–37)
BASOPHILS # BLD AUTO: 0.03 K/UL — SIGNIFICANT CHANGE UP (ref 0–0.2)
BASOPHILS NFR BLD AUTO: 0.3 % — SIGNIFICANT CHANGE UP (ref 0–2)
BILIRUB SERPL-MCNC: 0.4 MG/DL — SIGNIFICANT CHANGE UP (ref 0.2–1.2)
BUN SERPL-MCNC: 10 MG/DL — SIGNIFICANT CHANGE UP (ref 7–23)
CALCIUM SERPL-MCNC: 8.9 MG/DL — SIGNIFICANT CHANGE UP (ref 8.5–10.1)
CHLORIDE SERPL-SCNC: 105 MMOL/L — SIGNIFICANT CHANGE UP (ref 96–108)
CO2 SERPL-SCNC: 26 MMOL/L — SIGNIFICANT CHANGE UP (ref 22–31)
CREAT SERPL-MCNC: 1.12 MG/DL — SIGNIFICANT CHANGE UP (ref 0.5–1.3)
D DIMER BLD IA.RAPID-MCNC: <150 NG/ML DDU — SIGNIFICANT CHANGE UP
EGFR: 90 ML/MIN/1.73M2 — SIGNIFICANT CHANGE UP
EOSINOPHIL # BLD AUTO: 0.03 K/UL — SIGNIFICANT CHANGE UP (ref 0–0.5)
EOSINOPHIL NFR BLD AUTO: 0.3 % — SIGNIFICANT CHANGE UP (ref 0–6)
GLUCOSE SERPL-MCNC: 117 MG/DL — HIGH (ref 70–99)
HCT VFR BLD CALC: 39.3 % — SIGNIFICANT CHANGE UP (ref 39–50)
HGB BLD-MCNC: 13.7 G/DL — SIGNIFICANT CHANGE UP (ref 13–17)
HIV 1 & 2 AB SERPL IA.RAPID: SIGNIFICANT CHANGE UP
IMM GRANULOCYTES NFR BLD AUTO: 0.5 % — SIGNIFICANT CHANGE UP (ref 0–0.9)
LYMPHOCYTES # BLD AUTO: 2.32 K/UL — SIGNIFICANT CHANGE UP (ref 1–3.3)
LYMPHOCYTES # BLD AUTO: 26.2 % — SIGNIFICANT CHANGE UP (ref 13–44)
MCHC RBC-ENTMCNC: 28.6 PG — SIGNIFICANT CHANGE UP (ref 27–34)
MCHC RBC-ENTMCNC: 34.9 GM/DL — SIGNIFICANT CHANGE UP (ref 32–36)
MCV RBC AUTO: 82 FL — SIGNIFICANT CHANGE UP (ref 80–100)
MONOCYTES # BLD AUTO: 0.52 K/UL — SIGNIFICANT CHANGE UP (ref 0–0.9)
MONOCYTES NFR BLD AUTO: 5.9 % — SIGNIFICANT CHANGE UP (ref 2–14)
NEUTROPHILS # BLD AUTO: 5.92 K/UL — SIGNIFICANT CHANGE UP (ref 1.8–7.4)
NEUTROPHILS NFR BLD AUTO: 66.8 % — SIGNIFICANT CHANGE UP (ref 43–77)
PLATELET # BLD AUTO: 303 K/UL — SIGNIFICANT CHANGE UP (ref 150–400)
POTASSIUM SERPL-MCNC: 3.5 MMOL/L — SIGNIFICANT CHANGE UP (ref 3.5–5.3)
POTASSIUM SERPL-SCNC: 3.5 MMOL/L — SIGNIFICANT CHANGE UP (ref 3.5–5.3)
PROT SERPL-MCNC: 7.5 GM/DL — SIGNIFICANT CHANGE UP (ref 6–8.3)
RBC # BLD: 4.79 M/UL — SIGNIFICANT CHANGE UP (ref 4.2–5.8)
RBC # FLD: 13.2 % — SIGNIFICANT CHANGE UP (ref 10.3–14.5)
SODIUM SERPL-SCNC: 134 MMOL/L — LOW (ref 135–145)
WBC # BLD: 8.86 K/UL — SIGNIFICANT CHANGE UP (ref 3.8–10.5)
WBC # FLD AUTO: 8.86 K/UL — SIGNIFICANT CHANGE UP (ref 3.8–10.5)

## 2023-01-24 PROCEDURE — 73701 CT LOWER EXTREMITY W/DYE: CPT | Mod: 26,LT,MG

## 2023-01-24 PROCEDURE — 93971 EXTREMITY STUDY: CPT | Mod: 26,LT

## 2023-01-24 PROCEDURE — 93971 EXTREMITY STUDY: CPT | Mod: LT

## 2023-01-24 PROCEDURE — 80053 COMPREHEN METABOLIC PANEL: CPT

## 2023-01-24 PROCEDURE — 73552 X-RAY EXAM OF FEMUR 2/>: CPT | Mod: LT

## 2023-01-24 PROCEDURE — 86703 HIV-1/HIV-2 1 RESULT ANTBDY: CPT

## 2023-01-24 PROCEDURE — 36415 COLL VENOUS BLD VENIPUNCTURE: CPT

## 2023-01-24 PROCEDURE — 99284 EMERGENCY DEPT VISIT MOD MDM: CPT

## 2023-01-24 PROCEDURE — 99284 EMERGENCY DEPT VISIT MOD MDM: CPT | Mod: 25

## 2023-01-24 PROCEDURE — 85379 FIBRIN DEGRADATION QUANT: CPT

## 2023-01-24 PROCEDURE — G1004: CPT

## 2023-01-24 PROCEDURE — 73552 X-RAY EXAM OF FEMUR 2/>: CPT | Mod: 26,LT

## 2023-01-24 PROCEDURE — 85025 COMPLETE CBC W/AUTO DIFF WBC: CPT

## 2023-01-24 PROCEDURE — 73701 CT LOWER EXTREMITY W/DYE: CPT | Mod: MG,LT

## 2023-01-24 NOTE — ED ADULT TRIAGE NOTE - CHIEF COMPLAINT QUOTE
atraumatic left thigh pain x a few months. denies known injury. + tenderness upon palpation. has received sonogram of area with negative findings.

## 2023-01-24 NOTE — ED PROVIDER NOTE - PATIENT PORTAL LINK FT
You can access the FollowMyHealth Patient Portal offered by [facility] by registering at the following website: [email or web address]. By joining [employer]’s FollowMyHealth portal, you will also be able to view your health information using other applications (apps) compatible with our system.

## 2023-01-24 NOTE — ED PROVIDER NOTE - OBJECTIVE STATEMENT
31yo m pmh cholecystectomy, anxiety p/w left thigh pain x months. was seen here for similar and had ultrasound which showed non specific edema. no fevers.

## 2023-01-24 NOTE — ED PROVIDER NOTE - PHYSICAL EXAMINATION
GEN - NAD; well appearing; A+O x3   HEAD - NC/AT     EYES - EOMI, no conjunctival pallor, no scleral icterus  ENT -   mucous membranes  moist , no discharge      NECK - Neck supple  PULM - CTA b/l,  symmetric breath sounds  COR -  RRR, S1 S2, no murmurs  ABD - , ND, NT, soft, no guarding, no rebound, no masses    BACK - no CVA tenderness, nontender spine     EXTREMS - TTP lateral thigh w/ area of induration   SKIN - no rash or bruising      NEUROLOGIC - alert, sensation nl, motor 5/5 RUE/LUE/RLE/LLE

## 2023-01-24 NOTE — ED PROVIDER NOTE - NS ED ROS FT
Gen: No fever, normal appetite  Eyes: No eye irritation or discharge  ENT: No ear pain, congestion, sore throat  Resp: No cough or trouble breathing  Cardiovascular: No chest pain or palpitation  Gastroenteric: No nausea/vomiting, diarrhea, constipation  :  No change in urine output; no dysuria  MS: thigh pain   Skin: No rashes  Neuro: No headache; no abnormal movements  Remainder negative, except as per the HPI

## 2023-01-25 DIAGNOSIS — M79.652 PAIN IN LEFT THIGH: ICD-10-CM

## 2023-01-25 DIAGNOSIS — F90.9 ATTENTION-DEFICIT HYPERACTIVITY DISORDER, UNSPECIFIED TYPE: ICD-10-CM

## 2023-01-25 DIAGNOSIS — F41.9 ANXIETY DISORDER, UNSPECIFIED: ICD-10-CM

## 2023-01-25 DIAGNOSIS — Z90.49 ACQUIRED ABSENCE OF OTHER SPECIFIED PARTS OF DIGESTIVE TRACT: ICD-10-CM

## 2023-01-25 DIAGNOSIS — Z91.048 OTHER NONMEDICINAL SUBSTANCE ALLERGY STATUS: ICD-10-CM

## 2023-01-25 DIAGNOSIS — Z88.8 ALLERGY STATUS TO OTHER DRUGS, MEDICAMENTS AND BIOLOGICAL SUBSTANCES: ICD-10-CM

## 2023-01-25 DIAGNOSIS — K21.9 GASTRO-ESOPHAGEAL REFLUX DISEASE WITHOUT ESOPHAGITIS: ICD-10-CM

## 2023-02-08 ENCOUNTER — NON-APPOINTMENT (OUTPATIENT)
Age: 33
End: 2023-02-08

## 2023-02-08 ENCOUNTER — APPOINTMENT (OUTPATIENT)
Dept: NEPHROLOGY | Facility: CLINIC | Age: 33
End: 2023-02-08
Payer: COMMERCIAL

## 2023-02-08 VITALS
OXYGEN SATURATION: 95 % | BODY MASS INDEX: 27.17 KG/M2 | TEMPERATURE: 98.6 F | DIASTOLIC BLOOD PRESSURE: 78 MMHG | SYSTOLIC BLOOD PRESSURE: 130 MMHG | HEART RATE: 90 BPM | WEIGHT: 184 LBS

## 2023-02-08 DIAGNOSIS — R74.8 ABNORMAL LEVELS OF OTHER SERUM ENZYMES: ICD-10-CM

## 2023-02-08 DIAGNOSIS — Z00.00 ENCOUNTER FOR GENERAL ADULT MEDICAL EXAMINATION W/OUT ABNORMAL FINDINGS: ICD-10-CM

## 2023-02-08 PROCEDURE — 99204 OFFICE O/P NEW MOD 45 MIN: CPT

## 2023-02-08 NOTE — ASSESSMENT
[FreeTextEntry1] : 1. Nephrolithiasis - will initiate kidney stone prevention evaluation.  Recommendations per results.  Previous urinalysis suggested calcium oxalate crystals potentially indication calcium oxalate stone composition. \par \par 2. Proteinuria - unclear if related to cystitis or glomerular.  Will assess spot urine for protein/creatinine ratio.  \par \par 3. History of elevated liver enzymes - Also with a recent blood sugar of 117 (previous 93).  Will review lipid panel and HgbA1C.\par \par Reviewed with patient.

## 2023-02-08 NOTE — HISTORY OF PRESENT ILLNESS
[FreeTextEntry1] : Patient presenting for kidney stone prevention evaluation.  Stone history was reportedly only over the last two years, initiating after an episode of cholecystitis that developed into sepsis.  Since that time, he has had the recurrent stones and reports cystitis.  Family history includes nephrolithiasis in his father and uncle with father passing away from metastatic renal carcinoma.  Urinalysis have shown calcium oxalate crystals.  No stone composition is known.  Urine has shown some proteinuria.  He is trying to drink increased volume of fluids but at times is limited by cystitis symptoms.  Past labs showed creatinine .94 (8/24/22) and 1.12 (!/2023).  \par \par Renal ultrasound - 1/5/21 right 12.9 cm with .7 cm nonobstructing stone.  Left 12.8 cm.

## 2023-02-08 NOTE — REVIEW OF SYSTEMS
[Feeling Tired] : feeling tired [Lower Ext Edema] : lower extremity edema [Vomiting] : vomiting [Dysuria] : dysuria [Nocturia] : nocturia [Negative] : Heme/Lymph [FreeTextEntry4] : frequent wax in ears.  possible tinnitus [FreeTextEntry7] : nausea with periodic vomiting. [de-identified] : acne widespread.

## 2023-02-08 NOTE — PHYSICAL EXAM
[General Appearance - Alert] : alert [General Appearance - In No Acute Distress] : in no acute distress [General Appearance - Well Nourished] : well nourished [General Appearance - Well Developed] : well developed [General Appearance - Well-Appearing] : healthy appearing [Sclera] : the sclera and conjunctiva were normal [PERRL With Normal Accommodation] : pupils were equal in size, round, and reactive to light [Extraocular Movements] : extraocular movements were intact [Outer Ear] : the ears and nose were normal in appearance [Neck Appearance] : the appearance of the neck was normal [Jugular Venous Distention Increased] : there was no jugular-venous distention [] : no respiratory distress [Respiration, Rhythm And Depth] : normal respiratory rhythm and effort [Exaggerated Use Of Accessory Muscles For Inspiration] : no accessory muscle use [Auscultation Breath Sounds / Voice Sounds] : lungs were clear to auscultation bilaterally [Heart Sounds] : normal S1 and S2 [Arterial Pulses Carotid] : carotid pulses were normal with no bruits [Edema] : there was no peripheral edema [Bowel Sounds] : normal bowel sounds [Abdomen Soft] : soft [Abdomen Tenderness] : non-tender [No CVA Tenderness] : no ~M costovertebral angle tenderness [Abnormal Walk] : normal gait [Skin Color & Pigmentation] : normal skin color and pigmentation [Motor Exam] : the motor exam was normal [Oriented To Time, Place, And Person] : oriented to person, place, and time [Impaired Insight] : insight and judgment were intact [Affect] : the affect was normal [Mood] : the mood was normal [Memory Recent] : recent memory was not impaired

## 2023-02-13 ENCOUNTER — APPOINTMENT (OUTPATIENT)
Dept: NEPHROLOGY | Facility: CLINIC | Age: 33
End: 2023-02-13

## 2023-03-17 ENCOUNTER — LABORATORY RESULT (OUTPATIENT)
Age: 33
End: 2023-03-17

## 2023-03-21 NOTE — ASU DISCHARGE PLAN (ADULT/PEDIATRIC) - PAIN MANAGEMENT
Impression: Other vitreous opacities, bilateral: H43.393. Plan: Retinal detachment precautions were discussed with the patient at length.  If the patient notes any increase in symptoms, the patient knows to call us as soon as practical. Tylenol/Motrin for pain/Prescriptions electronically submitted to pharmacy from [facility]

## 2023-03-22 ENCOUNTER — LABORATORY RESULT (OUTPATIENT)
Age: 33
End: 2023-03-22

## 2023-03-29 ENCOUNTER — APPOINTMENT (OUTPATIENT)
Dept: UROLOGY | Facility: CLINIC | Age: 33
End: 2023-03-29

## 2023-04-23 NOTE — ASU PATIENT PROFILE, ADULT - ALCOHOL USE HISTORY SINGLE SELECT
Delivery Note:  Mable is a 30 y/o now  with vacuum assisted vaginal delivery of viable female infant at 41.1 weeks gestation on 23 @ 4622. Her , Carlos, has been supportive at bedside. She was a post date IOL and received 2 doses of vaginal cytotec. Maternal Labs: B+, RI, GBS-, H/H-, COVID n/a. AROM of clear fluid prior to pushing.1 dose of fentanyl during labor was used for pain management. Labor was complicated by category 2-3 tracing during stage 2. Vacuum on for 70 sec and 1 pull without pop off. Straight cath during pushing w/o urine return. Delivery team present. Baby girl cried within 1 min, delayed cord clamping, was dried and stimulated and placed S2S with mom. Cord gases sent. Apgars 8/9. AGA; 3600 g. 2nd degree and right sulcal tears repaired.  ml. Pit infusing @ 100/hr in left arm. VSS. Fundus firm 1 above U, midline, no clots, light flow. Winces during fundal rub, but declined tylenol or ibuprofen. Breastfeeding and bonding S2S with baby. Ordered breakfast. Continue routine post partum cares and protocols.     yes...

## 2023-04-24 ENCOUNTER — TRANSCRIPTION ENCOUNTER (OUTPATIENT)
Age: 33
End: 2023-04-24

## 2023-05-10 ENCOUNTER — APPOINTMENT (OUTPATIENT)
Dept: NEPHROLOGY | Facility: CLINIC | Age: 33
End: 2023-05-10
Payer: COMMERCIAL

## 2023-05-10 VITALS
WEIGHT: 202 LBS | BODY MASS INDEX: 29.92 KG/M2 | HEIGHT: 69 IN | HEART RATE: 86 BPM | TEMPERATURE: 98.2 F | OXYGEN SATURATION: 98 % | SYSTOLIC BLOOD PRESSURE: 144 MMHG | DIASTOLIC BLOOD PRESSURE: 66 MMHG

## 2023-05-10 DIAGNOSIS — R82.994 HYPERCALCIURIA: ICD-10-CM

## 2023-05-10 DIAGNOSIS — R80.9 PROTEINURIA, UNSPECIFIED: ICD-10-CM

## 2023-05-10 PROCEDURE — 99214 OFFICE O/P EST MOD 30 MIN: CPT

## 2023-05-10 NOTE — ASSESSMENT
[FreeTextEntry1] : 1. Nephrolithiasis - 24 hour urine noted for suboptimal volume of 1400 ml per day, urine calcium 15.7 mg/dl, pH 6.7, with low urine citrate 102.  Saturations noted for elevation in brushite.  Labs did not show any evidence of metabolic acidosis.  Patient may have an incomplete distal RTA.  While urine citrate is low, supplementation can not be considered at this time due to elevated urine pH and the risk for further calcium phosphate deposition.  We discussed the need to increase fluids to achieve two liters of urine output per day.  \par \par 2. Hypercalciuria - urine calcium at 15.7 mg/dl which is above target.  Will review vitamin D 25 levels prior to next visit.  Recommended that he try IP-6 (Inositol) prior to breakfast and dinner.  Will reassess response on next 24 hour urine collection.  Further recommendations per clinical course. \par \par 3. Proteinuria - Spot urine for protein/creatinine ratio normal.  Periodically  monitor..\par \par 4. Microscopic hematuria - likely due to presence of kidney stone.  He denied gross hematuria at time of URI.  Can consider other etiologies per clinical course, i.e. IgA nephropathy.  \par \par Reviewed with patient.  Follow up 3 months.

## 2023-05-10 NOTE — REVIEW OF SYSTEMS
[Feeling Tired] : feeling tired [Lower Ext Edema] : lower extremity edema [Dysuria] : dysuria [Nocturia] : nocturia [Negative] : Heme/Lymph [Vomiting] : no vomiting [Diarrhea] : diarrhea [FreeTextEntry4] : frequent wax in ears.  possible tinnitus [de-identified] : acne widespread.

## 2023-05-10 NOTE — HISTORY OF PRESENT ILLNESS
[FreeTextEntry1] : Patient previously presented for kidney stone prevention evaluation.  Stone history was reportedly only over the last two years, initiating after an episode of cholecystitis that developed into sepsis.  Since that time, he has had the recurrent stones and reports cystitis.  Family history includes nephrolithiasis in his father and uncle with father passing away from metastatic renal carcinoma.  Urinalysis have shown calcium oxalate crystals.  No stone composition is known.  Urine has shown some proteinuria.  He is trying to drink increased volume of fluids but at times is limited by cystitis symptoms.  Past labs showed creatinine .94 (8/24/22) and 1.12 (!/2023).  Denied gross hematuria. \par \par Renal ultrasound - 11/16/22 right 12.9 cm with .7 cm nonobstructing stone.  Left 12.8 cm.

## 2023-08-16 ENCOUNTER — EMERGENCY (EMERGENCY)
Facility: HOSPITAL | Age: 33
LOS: 0 days | Discharge: HOME | End: 2023-08-16
Attending: EMERGENCY MEDICINE
Payer: COMMERCIAL

## 2023-08-16 VITALS
SYSTOLIC BLOOD PRESSURE: 142 MMHG | RESPIRATION RATE: 17 BRPM | DIASTOLIC BLOOD PRESSURE: 90 MMHG | OXYGEN SATURATION: 97 % | HEART RATE: 83 BPM

## 2023-08-16 VITALS — WEIGHT: 199.96 LBS | HEIGHT: 70 IN

## 2023-08-16 DIAGNOSIS — F41.9 ANXIETY DISORDER, UNSPECIFIED: ICD-10-CM
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
## 2023-08-16 DIAGNOSIS — Z88.8 ALLERGY STATUS TO OTHER DRUGS, MEDICAMENTS AND BIOLOGICAL SUBSTANCES: ICD-10-CM

## 2023-08-16 DIAGNOSIS — Z91.018 ALLERGY TO OTHER FOODS: ICD-10-CM

## 2023-08-16 DIAGNOSIS — F98.8 OTHER SPECIFIED BEHAVIORAL AND EMOTIONAL DISORDERS WITH ONSET USUALLY OCCURRING IN CHILDHOOD AND ADOLESCENCE: ICD-10-CM

## 2023-08-16 DIAGNOSIS — F90.9 ATTENTION-DEFICIT HYPERACTIVITY DISORDER, UNSPECIFIED TYPE: ICD-10-CM

## 2023-08-16 DIAGNOSIS — Z98.890 OTHER SPECIFIED POSTPROCEDURAL STATES: Chronic | ICD-10-CM

## 2023-08-16 DIAGNOSIS — Z90.49 ACQUIRED ABSENCE OF OTHER SPECIFIED PARTS OF DIGESTIVE TRACT: ICD-10-CM

## 2023-08-16 DIAGNOSIS — S00.01XA ABRASION OF SCALP, INITIAL ENCOUNTER: ICD-10-CM

## 2023-08-16 DIAGNOSIS — S06.0X0A CONCUSSION WITHOUT LOSS OF CONSCIOUSNESS, INITIAL ENCOUNTER: ICD-10-CM

## 2023-08-16 DIAGNOSIS — Z90.49 ACQUIRED ABSENCE OF OTHER SPECIFIED PARTS OF DIGESTIVE TRACT: Chronic | ICD-10-CM

## 2023-08-16 DIAGNOSIS — R51.9 HEADACHE, UNSPECIFIED: ICD-10-CM

## 2023-08-16 PROCEDURE — 70450 CT HEAD/BRAIN W/O DYE: CPT | Mod: MG

## 2023-08-16 PROCEDURE — 93005 ELECTROCARDIOGRAM TRACING: CPT

## 2023-08-16 PROCEDURE — 99284 EMERGENCY DEPT VISIT MOD MDM: CPT

## 2023-08-16 PROCEDURE — 70450 CT HEAD/BRAIN W/O DYE: CPT | Mod: 26,MG

## 2023-08-16 PROCEDURE — 99284 EMERGENCY DEPT VISIT MOD MDM: CPT | Mod: 25

## 2023-08-16 PROCEDURE — 93010 ELECTROCARDIOGRAM REPORT: CPT

## 2023-08-16 PROCEDURE — G1004: CPT

## 2023-08-16 NOTE — ED ADULT TRIAGE NOTE - CHIEF COMPLAINT QUOTE
Pt to ED s/p fall few hours ago. Pt states "I remember walking to the fridge and next thing I remember is I woke up on the floor bleeding from back of head". Bleeding controlled upon arrival. Pt on Adderall and xanax for anxiety and panic attacks. Last xanax few hours ago as per pt. Denies blurry vision, headache or any other complaints at this time. ambulatory in triage. Denies drinking or any other drug use. Not on blood thinners.

## 2023-08-16 NOTE — ED ADULT NURSE NOTE - OBJECTIVE STATEMENT
pt presents to ED s/p slip and fall. pt struck back of his head and "woke up in blood". +LOC. pt states his kitchen floor is uneven and that is why he fell. pt denies pmhx. pt denies pain. pt denies alcohol use. pt denies cp, sob, HA. pt in NAD, with no other complaints at this time.

## 2023-08-16 NOTE — ED PROVIDER NOTE - OBJECTIVE STATEMENT
Patient presents to ED after what he describes as a mechanical fall describing generalized achy nonradiating occipital headache at my evaluation he denies any chest pain or shortness of breath no hemoptysis no leg swelling no abdominal pain.  No nausea vomiting diarrhea no current motor or sensory deficits denies any drug abuse

## 2023-08-16 NOTE — ED PROVIDER NOTE - PATIENT PORTAL LINK FT
You can access the FollowMyHealth Patient Portal offered by [facility] by registering at the following website: [email or web address]. By joining [employer]’s FollowMyHealth portal, you will also be able to view your health information using other applications (apps) compatible with our system.

## 2023-08-16 NOTE — ED PROVIDER NOTE - CLINICAL SUMMARY MEDICAL DECISION MAKING FREE TEXT BOX
patient describes mechanical fall CT head negative neuro intact no chest pain or shortness of breath EKG normal return to ED for intractable headache persistent vomiting new onset chest pain or shortness of breath patient agrees to plan of care do not suspect PE clinically patient agrees to plan of care

## 2023-08-16 NOTE — ED ADULT NURSE REASSESSMENT NOTE - NS ED NURSE REASSESS COMMENT FT1
patient alert and oriented x 4, perrl, respirations even and unlabored, ms strength 5/5 upper and lower ext bilaterally, denies dizziness/headache/pain. ambulatory with steady gait. written and verbal discharge and followup instructions given to patient, patient verbalized back understanding. no complaints at time of discharge. discharged home from ED at 0342.

## 2023-08-18 NOTE — DISCHARGE NOTE NURSING/CASE MANAGEMENT/SOCIAL WORK - NSTOBACCOREFERRAL_GEN_A_CS
No events overnight, trop peaked at 0.5, TTE with new WMAs, cardiology consulted, continued on ACS protocol.  
Patient declined information

## 2023-09-20 ENCOUNTER — APPOINTMENT (OUTPATIENT)
Dept: UROLOGY | Facility: CLINIC | Age: 33
End: 2023-09-20
Payer: COMMERCIAL

## 2023-09-20 DIAGNOSIS — N41.0 ACUTE PROSTATITIS: ICD-10-CM

## 2023-09-20 LAB
APPEARANCE: CLEAR
BACTERIA: NEGATIVE /HPF
BILIRUBIN URINE: NEGATIVE
BLOOD URINE: ABNORMAL
CAST: 0 /LPF
COLOR: YELLOW
EPITHELIAL CELLS: 0 /HPF
GLUCOSE QUALITATIVE U: NEGATIVE MG/DL
KETONES URINE: NEGATIVE MG/DL
LEUKOCYTE ESTERASE URINE: NEGATIVE
MICROSCOPIC-UA: NORMAL
NITRITE URINE: NEGATIVE
PH URINE: 6
PROTEIN URINE: 100 MG/DL
RED BLOOD CELLS URINE: 14 /HPF
SPECIFIC GRAVITY URINE: 1.02
UROBILINOGEN URINE: 1 MG/DL
WHITE BLOOD CELLS URINE: 2 /HPF

## 2023-09-20 PROCEDURE — 99214 OFFICE O/P EST MOD 30 MIN: CPT

## 2023-09-20 RX ORDER — ALFUZOSIN HYDROCHLORIDE 10 MG/1
10 TABLET, EXTENDED RELEASE ORAL DAILY
Qty: 30 | Refills: 5 | Status: ACTIVE | COMMUNITY
Start: 2023-09-20 | End: 1900-01-01

## 2023-09-21 LAB — BACTERIA UR CULT: NORMAL

## 2023-10-10 ENCOUNTER — RESULT REVIEW (OUTPATIENT)
Age: 33
End: 2023-10-10

## 2023-10-11 ENCOUNTER — OUTPATIENT (OUTPATIENT)
Dept: OUTPATIENT SERVICES | Facility: HOSPITAL | Age: 33
LOS: 1 days | End: 2023-10-11
Payer: COMMERCIAL

## 2023-10-11 ENCOUNTER — APPOINTMENT (OUTPATIENT)
Dept: CT IMAGING | Facility: CLINIC | Age: 33
End: 2023-10-11
Payer: COMMERCIAL

## 2023-10-11 DIAGNOSIS — R35.0 FREQUENCY OF MICTURITION: ICD-10-CM

## 2023-10-11 DIAGNOSIS — Z90.49 ACQUIRED ABSENCE OF OTHER SPECIFIED PARTS OF DIGESTIVE TRACT: Chronic | ICD-10-CM

## 2023-10-11 DIAGNOSIS — Z98.890 OTHER SPECIFIED POSTPROCEDURAL STATES: Chronic | ICD-10-CM

## 2023-10-11 PROCEDURE — 74178 CT ABD&PLV WO CNTR FLWD CNTR: CPT | Mod: 26

## 2023-10-11 PROCEDURE — 74178 CT ABD&PLV WO CNTR FLWD CNTR: CPT

## 2023-10-13 ENCOUNTER — APPOINTMENT (OUTPATIENT)
Dept: UROLOGY | Facility: CLINIC | Age: 33
End: 2023-10-13
Payer: COMMERCIAL

## 2023-10-13 VITALS
HEIGHT: 69 IN | BODY MASS INDEX: 29.92 KG/M2 | DIASTOLIC BLOOD PRESSURE: 82 MMHG | SYSTOLIC BLOOD PRESSURE: 149 MMHG | WEIGHT: 202 LBS

## 2023-10-13 DIAGNOSIS — R31.29 OTHER MICROSCOPIC HEMATURIA: ICD-10-CM

## 2023-10-13 PROCEDURE — 52000 CYSTOURETHROSCOPY: CPT

## 2023-10-13 PROCEDURE — 99214 OFFICE O/P EST MOD 30 MIN: CPT | Mod: 25

## 2023-10-17 PROBLEM — R31.29 HEMATURIA, MICROSCOPIC: Status: ACTIVE | Noted: 2021-09-13

## 2023-10-17 LAB
APPEARANCE: ABNORMAL
BACTERIA UR CULT: NORMAL
BACTERIA: NEGATIVE /HPF
BILIRUBIN URINE: NEGATIVE
BLOOD URINE: ABNORMAL
CAST: 1 /LPF
COARSE GRANULAR CASTS: PRESENT
COLOR: YELLOW
EPITHELIAL CELLS: 2 /HPF
GLUCOSE QUALITATIVE U: NEGATIVE MG/DL
KETONES URINE: NEGATIVE MG/DL
LEUKOCYTE ESTERASE URINE: ABNORMAL
MICROSCOPIC-UA: NORMAL
NITRITE URINE: NEGATIVE
PH URINE: 6.5
PROTEIN URINE: 100 MG/DL
RED BLOOD CELLS URINE: 51 /HPF
REVIEW: NORMAL
SPECIFIC GRAVITY URINE: 1.01
TRANSITIONAL EPITHELIAL CELLS: PRESENT
UROBILINOGEN URINE: 0.2 MG/DL
WHITE BLOOD CELLS URINE: 2 /HPF

## 2023-10-19 LAB — URINE CYTOLOGY: NORMAL

## 2023-10-20 ENCOUNTER — APPOINTMENT (OUTPATIENT)
Dept: UROLOGY | Facility: CLINIC | Age: 33
End: 2023-10-20

## 2023-10-27 RX ORDER — CIPROFLOXACIN HYDROCHLORIDE 500 MG/1
500 TABLET, FILM COATED ORAL
Qty: 14 | Refills: 0 | Status: COMPLETED | COMMUNITY
Start: 2021-08-31 | End: 2023-10-27

## 2023-10-27 RX ORDER — MAGNESIUM HYDROXIDE 400 MG/5ML
1200 SUSPENSION, ORAL (FINAL DOSE FORM) ORAL DAILY
Qty: 1 | Refills: 0 | Status: COMPLETED | COMMUNITY
Start: 2023-01-13 | End: 2023-10-27

## 2023-10-27 RX ORDER — OMEPRAZOLE 40 MG/1
40 CAPSULE, DELAYED RELEASE ORAL
Qty: 30 | Refills: 3 | Status: COMPLETED | COMMUNITY
Start: 2022-09-02 | End: 2023-10-27

## 2023-10-27 RX ORDER — HYOSCYAMINE SULFATE 0.12 MG/1
0.12 TABLET ORAL
Qty: 180 | Refills: 0 | Status: COMPLETED | COMMUNITY
Start: 2022-09-02 | End: 2023-10-27

## 2023-10-27 RX ORDER — CIPROFLOXACIN HYDROCHLORIDE 500 MG/1
500 TABLET, FILM COATED ORAL TWICE DAILY
Qty: 28 | Refills: 0 | Status: COMPLETED | COMMUNITY
Start: 2023-01-13 | End: 2023-10-27

## 2023-10-27 RX ORDER — SUCRALFATE 1 G/1
1 TABLET ORAL
Qty: 30 | Refills: 0 | Status: COMPLETED | COMMUNITY
Start: 2022-09-02 | End: 2023-10-27

## 2023-10-27 RX ORDER — CIPROFLOXACIN HYDROCHLORIDE 500 MG/1
500 TABLET, FILM COATED ORAL TWICE DAILY
Qty: 14 | Refills: 0 | Status: COMPLETED | COMMUNITY
Start: 2023-09-18 | End: 2023-10-27

## 2023-10-27 RX ORDER — DOXYCYCLINE HYCLATE 100 MG/1
100 TABLET ORAL
Qty: 20 | Refills: 0 | Status: COMPLETED | COMMUNITY
Start: 2021-08-25 | End: 2023-10-27

## 2023-10-27 RX ORDER — PHENAZOPYRIDINE 200 MG/1
200 TABLET, FILM COATED ORAL
Qty: 60 | Refills: 3 | Status: COMPLETED | COMMUNITY
Start: 2021-09-13 | End: 2023-10-27

## 2023-10-27 RX ORDER — PHENAZOPYRIDINE HYDROCHLORIDE 100 MG/1
100 TABLET ORAL 3 TIMES DAILY
Qty: 9 | Refills: 1 | Status: COMPLETED | COMMUNITY
Start: 2023-10-04 | End: 2023-10-27

## 2023-10-27 RX ORDER — OXYBUTYNIN CHLORIDE 10 MG/1
10 TABLET, EXTENDED RELEASE ORAL
Qty: 30 | Refills: 3 | Status: COMPLETED | COMMUNITY
Start: 2021-09-13 | End: 2023-10-27

## 2023-10-27 RX ORDER — DOXYCYCLINE 100 MG/1
100 TABLET, FILM COATED ORAL
Qty: 20 | Refills: 0 | Status: COMPLETED | COMMUNITY
Start: 2022-11-04 | End: 2023-10-27

## 2023-10-30 ENCOUNTER — RESULT REVIEW (OUTPATIENT)
Age: 33
End: 2023-10-30

## 2023-10-30 ENCOUNTER — OUTPATIENT (OUTPATIENT)
Dept: OUTPATIENT SERVICES | Facility: HOSPITAL | Age: 33
LOS: 1 days | End: 2023-10-30
Payer: COMMERCIAL

## 2023-10-30 VITALS
HEIGHT: 70 IN | SYSTOLIC BLOOD PRESSURE: 128 MMHG | HEART RATE: 86 BPM | WEIGHT: 199.96 LBS | DIASTOLIC BLOOD PRESSURE: 76 MMHG | OXYGEN SATURATION: 100 % | TEMPERATURE: 98 F | RESPIRATION RATE: 18 BRPM

## 2023-10-30 DIAGNOSIS — Z98.890 OTHER SPECIFIED POSTPROCEDURAL STATES: Chronic | ICD-10-CM

## 2023-10-30 DIAGNOSIS — Z01.818 ENCOUNTER FOR OTHER PREPROCEDURAL EXAMINATION: ICD-10-CM

## 2023-10-30 DIAGNOSIS — N32.9 BLADDER DISORDER, UNSPECIFIED: ICD-10-CM

## 2023-10-30 DIAGNOSIS — Z90.49 ACQUIRED ABSENCE OF OTHER SPECIFIED PARTS OF DIGESTIVE TRACT: Chronic | ICD-10-CM

## 2023-10-30 LAB
ANION GAP SERPL CALC-SCNC: 6 MMOL/L — SIGNIFICANT CHANGE UP (ref 5–17)
ANION GAP SERPL CALC-SCNC: 6 MMOL/L — SIGNIFICANT CHANGE UP (ref 5–17)
APPEARANCE UR: ABNORMAL
APPEARANCE UR: ABNORMAL
APTT BLD: 28.8 SEC — SIGNIFICANT CHANGE UP (ref 24.5–35.6)
APTT BLD: 28.8 SEC — SIGNIFICANT CHANGE UP (ref 24.5–35.6)
BACTERIA # UR AUTO: ABNORMAL /HPF
BACTERIA # UR AUTO: ABNORMAL /HPF
BASOPHILS # BLD AUTO: 0.03 K/UL — SIGNIFICANT CHANGE UP (ref 0–0.2)
BASOPHILS # BLD AUTO: 0.03 K/UL — SIGNIFICANT CHANGE UP (ref 0–0.2)
BASOPHILS NFR BLD AUTO: 0.3 % — SIGNIFICANT CHANGE UP (ref 0–2)
BASOPHILS NFR BLD AUTO: 0.3 % — SIGNIFICANT CHANGE UP (ref 0–2)
BILIRUB UR-MCNC: SIGNIFICANT CHANGE UP
BILIRUB UR-MCNC: SIGNIFICANT CHANGE UP
BLD GP AB SCN SERPL QL: SIGNIFICANT CHANGE UP
BLD GP AB SCN SERPL QL: SIGNIFICANT CHANGE UP
BUN SERPL-MCNC: 13 MG/DL — SIGNIFICANT CHANGE UP (ref 7–23)
BUN SERPL-MCNC: 13 MG/DL — SIGNIFICANT CHANGE UP (ref 7–23)
CALCIUM SERPL-MCNC: 8.7 MG/DL — SIGNIFICANT CHANGE UP (ref 8.5–10.1)
CALCIUM SERPL-MCNC: 8.7 MG/DL — SIGNIFICANT CHANGE UP (ref 8.5–10.1)
CHLORIDE SERPL-SCNC: 110 MMOL/L — HIGH (ref 96–108)
CHLORIDE SERPL-SCNC: 110 MMOL/L — HIGH (ref 96–108)
CO2 SERPL-SCNC: 28 MMOL/L — SIGNIFICANT CHANGE UP (ref 22–31)
CO2 SERPL-SCNC: 28 MMOL/L — SIGNIFICANT CHANGE UP (ref 22–31)
COLOR SPEC: ABNORMAL
COLOR SPEC: ABNORMAL
COMMENT - URINE: SIGNIFICANT CHANGE UP
COMMENT - URINE: SIGNIFICANT CHANGE UP
CREAT SERPL-MCNC: 1.07 MG/DL — SIGNIFICANT CHANGE UP (ref 0.5–1.3)
CREAT SERPL-MCNC: 1.07 MG/DL — SIGNIFICANT CHANGE UP (ref 0.5–1.3)
DIFF PNL FLD: SIGNIFICANT CHANGE UP
DIFF PNL FLD: SIGNIFICANT CHANGE UP
EGFR: 95 ML/MIN/1.73M2 — SIGNIFICANT CHANGE UP
EGFR: 95 ML/MIN/1.73M2 — SIGNIFICANT CHANGE UP
EOSINOPHIL # BLD AUTO: 0.09 K/UL — SIGNIFICANT CHANGE UP (ref 0–0.5)
EOSINOPHIL # BLD AUTO: 0.09 K/UL — SIGNIFICANT CHANGE UP (ref 0–0.5)
EOSINOPHIL NFR BLD AUTO: 1 % — SIGNIFICANT CHANGE UP (ref 0–6)
EOSINOPHIL NFR BLD AUTO: 1 % — SIGNIFICANT CHANGE UP (ref 0–6)
GLUCOSE SERPL-MCNC: 110 MG/DL — HIGH (ref 70–99)
GLUCOSE SERPL-MCNC: 110 MG/DL — HIGH (ref 70–99)
GLUCOSE UR QL: SIGNIFICANT CHANGE UP
GLUCOSE UR QL: SIGNIFICANT CHANGE UP
HCT VFR BLD CALC: 38.8 % — LOW (ref 39–50)
HCT VFR BLD CALC: 38.8 % — LOW (ref 39–50)
HGB BLD-MCNC: 13.2 G/DL — SIGNIFICANT CHANGE UP (ref 13–17)
HGB BLD-MCNC: 13.2 G/DL — SIGNIFICANT CHANGE UP (ref 13–17)
HYALINE CASTS # UR AUTO: PRESENT
HYALINE CASTS # UR AUTO: PRESENT
IMM GRANULOCYTES NFR BLD AUTO: 0.2 % — SIGNIFICANT CHANGE UP (ref 0–0.9)
IMM GRANULOCYTES NFR BLD AUTO: 0.2 % — SIGNIFICANT CHANGE UP (ref 0–0.9)
INR BLD: 0.9 RATIO — SIGNIFICANT CHANGE UP (ref 0.85–1.18)
INR BLD: 0.9 RATIO — SIGNIFICANT CHANGE UP (ref 0.85–1.18)
KETONES UR-MCNC: SIGNIFICANT CHANGE UP
KETONES UR-MCNC: SIGNIFICANT CHANGE UP
LEUKOCYTE ESTERASE UR-ACNC: SIGNIFICANT CHANGE UP
LEUKOCYTE ESTERASE UR-ACNC: SIGNIFICANT CHANGE UP
LYMPHOCYTES # BLD AUTO: 2.46 K/UL — SIGNIFICANT CHANGE UP (ref 1–3.3)
LYMPHOCYTES # BLD AUTO: 2.46 K/UL — SIGNIFICANT CHANGE UP (ref 1–3.3)
LYMPHOCYTES # BLD AUTO: 28.3 % — SIGNIFICANT CHANGE UP (ref 13–44)
LYMPHOCYTES # BLD AUTO: 28.3 % — SIGNIFICANT CHANGE UP (ref 13–44)
MCHC RBC-ENTMCNC: 28.6 PG — SIGNIFICANT CHANGE UP (ref 27–34)
MCHC RBC-ENTMCNC: 28.6 PG — SIGNIFICANT CHANGE UP (ref 27–34)
MCHC RBC-ENTMCNC: 34 GM/DL — SIGNIFICANT CHANGE UP (ref 32–36)
MCHC RBC-ENTMCNC: 34 GM/DL — SIGNIFICANT CHANGE UP (ref 32–36)
MCV RBC AUTO: 84.2 FL — SIGNIFICANT CHANGE UP (ref 80–100)
MCV RBC AUTO: 84.2 FL — SIGNIFICANT CHANGE UP (ref 80–100)
MONOCYTES # BLD AUTO: 0.65 K/UL — SIGNIFICANT CHANGE UP (ref 0–0.9)
MONOCYTES # BLD AUTO: 0.65 K/UL — SIGNIFICANT CHANGE UP (ref 0–0.9)
MONOCYTES NFR BLD AUTO: 7.5 % — SIGNIFICANT CHANGE UP (ref 2–14)
MONOCYTES NFR BLD AUTO: 7.5 % — SIGNIFICANT CHANGE UP (ref 2–14)
NEUTROPHILS # BLD AUTO: 5.45 K/UL — SIGNIFICANT CHANGE UP (ref 1.8–7.4)
NEUTROPHILS # BLD AUTO: 5.45 K/UL — SIGNIFICANT CHANGE UP (ref 1.8–7.4)
NEUTROPHILS NFR BLD AUTO: 62.7 % — SIGNIFICANT CHANGE UP (ref 43–77)
NEUTROPHILS NFR BLD AUTO: 62.7 % — SIGNIFICANT CHANGE UP (ref 43–77)
NITRITE UR-MCNC: SIGNIFICANT CHANGE UP
NITRITE UR-MCNC: SIGNIFICANT CHANGE UP
PH UR: SIGNIFICANT CHANGE UP (ref 5–8)
PH UR: SIGNIFICANT CHANGE UP (ref 5–8)
PLATELET # BLD AUTO: 340 K/UL — SIGNIFICANT CHANGE UP (ref 150–400)
PLATELET # BLD AUTO: 340 K/UL — SIGNIFICANT CHANGE UP (ref 150–400)
POTASSIUM SERPL-MCNC: 3.6 MMOL/L — SIGNIFICANT CHANGE UP (ref 3.5–5.3)
POTASSIUM SERPL-MCNC: 3.6 MMOL/L — SIGNIFICANT CHANGE UP (ref 3.5–5.3)
POTASSIUM SERPL-SCNC: 3.6 MMOL/L — SIGNIFICANT CHANGE UP (ref 3.5–5.3)
POTASSIUM SERPL-SCNC: 3.6 MMOL/L — SIGNIFICANT CHANGE UP (ref 3.5–5.3)
PROT UR-MCNC: SIGNIFICANT CHANGE UP
PROT UR-MCNC: SIGNIFICANT CHANGE UP
PROTHROM AB SERPL-ACNC: 10.2 SEC — SIGNIFICANT CHANGE UP (ref 9.5–13)
PROTHROM AB SERPL-ACNC: 10.2 SEC — SIGNIFICANT CHANGE UP (ref 9.5–13)
RBC # BLD: 4.61 M/UL — SIGNIFICANT CHANGE UP (ref 4.2–5.8)
RBC # BLD: 4.61 M/UL — SIGNIFICANT CHANGE UP (ref 4.2–5.8)
RBC # FLD: 12.9 % — SIGNIFICANT CHANGE UP (ref 10.3–14.5)
RBC # FLD: 12.9 % — SIGNIFICANT CHANGE UP (ref 10.3–14.5)
RBC CASTS # UR COMP ASSIST: SIGNIFICANT CHANGE UP /HPF (ref 0–4)
RBC CASTS # UR COMP ASSIST: SIGNIFICANT CHANGE UP /HPF (ref 0–4)
SODIUM SERPL-SCNC: 144 MMOL/L — SIGNIFICANT CHANGE UP (ref 135–145)
SODIUM SERPL-SCNC: 144 MMOL/L — SIGNIFICANT CHANGE UP (ref 135–145)
SP GR SPEC: SIGNIFICANT CHANGE UP (ref 1–1.03)
SP GR SPEC: SIGNIFICANT CHANGE UP (ref 1–1.03)
UROBILINOGEN FLD QL: SIGNIFICANT CHANGE UP (ref 0.2–1)
UROBILINOGEN FLD QL: SIGNIFICANT CHANGE UP (ref 0.2–1)
WBC # BLD: 8.7 K/UL — SIGNIFICANT CHANGE UP (ref 3.8–10.5)
WBC # BLD: 8.7 K/UL — SIGNIFICANT CHANGE UP (ref 3.8–10.5)
WBC # FLD AUTO: 8.7 K/UL — SIGNIFICANT CHANGE UP (ref 3.8–10.5)
WBC # FLD AUTO: 8.7 K/UL — SIGNIFICANT CHANGE UP (ref 3.8–10.5)
WBC UR QL: >50 /HPF — SIGNIFICANT CHANGE UP (ref 0–5)
WBC UR QL: >50 /HPF — SIGNIFICANT CHANGE UP (ref 0–5)

## 2023-10-30 PROCEDURE — 80048 BASIC METABOLIC PNL TOTAL CA: CPT

## 2023-10-30 PROCEDURE — 86850 RBC ANTIBODY SCREEN: CPT

## 2023-10-30 PROCEDURE — 81001 URINALYSIS AUTO W/SCOPE: CPT

## 2023-10-30 PROCEDURE — 87086 URINE CULTURE/COLONY COUNT: CPT

## 2023-10-30 PROCEDURE — 85610 PROTHROMBIN TIME: CPT

## 2023-10-30 PROCEDURE — 71046 X-RAY EXAM CHEST 2 VIEWS: CPT

## 2023-10-30 PROCEDURE — 99214 OFFICE O/P EST MOD 30 MIN: CPT | Mod: 25

## 2023-10-30 PROCEDURE — 93005 ELECTROCARDIOGRAM TRACING: CPT

## 2023-10-30 PROCEDURE — 85025 COMPLETE CBC W/AUTO DIFF WBC: CPT

## 2023-10-30 PROCEDURE — 93010 ELECTROCARDIOGRAM REPORT: CPT

## 2023-10-30 PROCEDURE — 86901 BLOOD TYPING SEROLOGIC RH(D): CPT

## 2023-10-30 PROCEDURE — 36415 COLL VENOUS BLD VENIPUNCTURE: CPT

## 2023-10-30 PROCEDURE — 86900 BLOOD TYPING SEROLOGIC ABO: CPT

## 2023-10-30 PROCEDURE — 71046 X-RAY EXAM CHEST 2 VIEWS: CPT | Mod: 26

## 2023-10-30 PROCEDURE — 85730 THROMBOPLASTIN TIME PARTIAL: CPT

## 2023-10-30 RX ORDER — DEXTROAMPHETAMINE SACCHARATE, AMPHETAMINE ASPARTATE, DEXTROAMPHETAMINE SULFATE AND AMPHETAMINE SULFATE 1.875; 1.875; 1.875; 1.875 MG/1; MG/1; MG/1; MG/1
15 TABLET ORAL
Qty: 0 | Refills: 0 | DISCHARGE

## 2023-10-30 RX ORDER — MULTIVIT-MIN/FERROUS GLUCONATE 9 MG/15 ML
1 LIQUID (ML) ORAL
Qty: 0 | Refills: 0 | DISCHARGE

## 2023-10-30 RX ORDER — CLONAZEPAM 1 MG
1 TABLET ORAL
Qty: 0 | Refills: 0 | DISCHARGE

## 2023-10-30 NOTE — H&P PST ADULT - NSICDXFAMILYHX_GEN_ALL_CORE_FT
FAMILY HISTORY:  Father  Still living? No  Family history of renal cancer, Age at diagnosis: Age Unknown    Grandparent  Still living? Unknown  Family history of diabetes mellitus (DM), Age at diagnosis: Age Unknown

## 2023-10-30 NOTE — H&P PST ADULT - HISTORY OF PRESENT ILLNESS
32 y/o male with  32 y/o male with lesion of bladder, PMH of hematuria, ADHD, anxiety. Pt reports bladder lesion during follow up visit with urologist, he complain of dysuria, occasional blood in his urine, increased urine frequency. He is scheduled for TURBT.

## 2023-10-30 NOTE — H&P PST ADULT - NSICDXPASTSURGICALHX_GEN_ALL_CORE_FT
PAST SURGICAL HISTORY:  History of biopsy of bladder     History of endoscopy upper    S/P cystoscopy     S/P laparoscopic cholecystectomy 1/2021

## 2023-10-30 NOTE — H&P PST ADULT - ASSESSMENT
34 y/o male with lesion of bladder, PMH of hematuria, ADHD, anxiety. He is scheduled for TURBT.   Plan  1. Stop all NSAIDS, herbal supplements and vitamins for 7 days.  2. NPO as per ASU instructions.  3. Labs, EKG as per surgeon.  4. PMD visit for optimization prior to surgery as per surgeon

## 2023-10-31 ENCOUNTER — APPOINTMENT (OUTPATIENT)
Dept: FAMILY MEDICINE | Facility: CLINIC | Age: 33
End: 2023-10-31
Payer: COMMERCIAL

## 2023-10-31 VITALS
BODY MASS INDEX: 29.62 KG/M2 | SYSTOLIC BLOOD PRESSURE: 124 MMHG | HEIGHT: 69 IN | WEIGHT: 200 LBS | DIASTOLIC BLOOD PRESSURE: 88 MMHG | HEART RATE: 92 BPM | OXYGEN SATURATION: 98 %

## 2023-10-31 DIAGNOSIS — Z82.3 FAMILY HISTORY OF STROKE: ICD-10-CM

## 2023-10-31 DIAGNOSIS — Z00.00 ENCOUNTER FOR GENERAL ADULT MEDICAL EXAMINATION W/OUT ABNORMAL FINDINGS: ICD-10-CM

## 2023-10-31 DIAGNOSIS — Z87.891 PERSONAL HISTORY OF NICOTINE DEPENDENCE: ICD-10-CM

## 2023-10-31 DIAGNOSIS — Z82.0 FAMILY HISTORY OF EPILEPSY AND OTHER DISEASES OF THE NERVOUS SYSTEM: ICD-10-CM

## 2023-10-31 DIAGNOSIS — Z78.9 OTHER SPECIFIED HEALTH STATUS: ICD-10-CM

## 2023-10-31 DIAGNOSIS — F17.290 NICOTINE DEPENDENCE, OTHER TOBACCO PRODUCT, UNCOMPLICATED: ICD-10-CM

## 2023-10-31 DIAGNOSIS — Z72.0 TOBACCO USE: ICD-10-CM

## 2023-10-31 DIAGNOSIS — Z01.818 ENCOUNTER FOR OTHER PREPROCEDURAL EXAMINATION: ICD-10-CM

## 2023-10-31 DIAGNOSIS — N32.9 BLADDER DISORDER, UNSPECIFIED: ICD-10-CM

## 2023-10-31 PROCEDURE — 99385 PREV VISIT NEW AGE 18-39: CPT | Mod: 25

## 2023-10-31 PROCEDURE — 99204 OFFICE O/P NEW MOD 45 MIN: CPT | Mod: 25

## 2023-10-31 PROCEDURE — 99406 BEHAV CHNG SMOKING 3-10 MIN: CPT | Mod: NC

## 2023-11-01 ENCOUNTER — APPOINTMENT (OUTPATIENT)
Dept: FAMILY MEDICINE | Facility: CLINIC | Age: 33
End: 2023-11-01

## 2023-11-06 ENCOUNTER — TRANSCRIPTION ENCOUNTER (OUTPATIENT)
Age: 33
End: 2023-11-06

## 2023-11-06 ENCOUNTER — RESULT REVIEW (OUTPATIENT)
Age: 33
End: 2023-11-06

## 2023-11-06 ENCOUNTER — OUTPATIENT (OUTPATIENT)
Dept: INPATIENT UNIT | Facility: HOSPITAL | Age: 33
LOS: 1 days | Discharge: HOME | End: 2023-11-06
Payer: COMMERCIAL

## 2023-11-06 ENCOUNTER — APPOINTMENT (OUTPATIENT)
Dept: UROLOGY | Facility: HOSPITAL | Age: 33
End: 2023-11-06

## 2023-11-06 VITALS
DIASTOLIC BLOOD PRESSURE: 88 MMHG | HEART RATE: 114 BPM | WEIGHT: 199.96 LBS | RESPIRATION RATE: 18 BRPM | TEMPERATURE: 97 F | OXYGEN SATURATION: 100 % | SYSTOLIC BLOOD PRESSURE: 148 MMHG | HEIGHT: 70 IN

## 2023-11-06 VITALS
SYSTOLIC BLOOD PRESSURE: 140 MMHG | DIASTOLIC BLOOD PRESSURE: 89 MMHG | RESPIRATION RATE: 16 BRPM | OXYGEN SATURATION: 100 % | HEART RATE: 80 BPM

## 2023-11-06 DIAGNOSIS — N32.9 BLADDER DISORDER, UNSPECIFIED: ICD-10-CM

## 2023-11-06 DIAGNOSIS — Z90.49 ACQUIRED ABSENCE OF OTHER SPECIFIED PARTS OF DIGESTIVE TRACT: Chronic | ICD-10-CM

## 2023-11-06 DIAGNOSIS — Z98.890 OTHER SPECIFIED POSTPROCEDURAL STATES: Chronic | ICD-10-CM

## 2023-11-06 DIAGNOSIS — N20.0 CALCULUS OF KIDNEY: ICD-10-CM

## 2023-11-06 DIAGNOSIS — N10 ACUTE PYELONEPHRITIS: ICD-10-CM

## 2023-11-06 PROCEDURE — 88307 TISSUE EXAM BY PATHOLOGIST: CPT | Mod: 26

## 2023-11-06 PROCEDURE — 52240 CYSTOSCOPY AND TREATMENT: CPT

## 2023-11-06 PROCEDURE — 74176 CT ABD & PELVIS W/O CONTRAST: CPT | Mod: 26,MA

## 2023-11-06 PROCEDURE — 74176 CT ABD & PELVIS W/O CONTRAST: CPT | Mod: MA

## 2023-11-06 PROCEDURE — 88307 TISSUE EXAM BY PATHOLOGIST: CPT

## 2023-11-06 RX ORDER — ONDANSETRON 8 MG/1
4 TABLET, FILM COATED ORAL ONCE
Refills: 0 | Status: DISCONTINUED | OUTPATIENT
Start: 2023-11-06 | End: 2023-11-06

## 2023-11-06 RX ORDER — PHENAZOPYRIDINE HCL 100 MG
200 TABLET ORAL ONCE
Refills: 0 | Status: COMPLETED | OUTPATIENT
Start: 2023-11-06 | End: 2023-11-06

## 2023-11-06 RX ORDER — TRAMADOL HYDROCHLORIDE 50 MG/1
1 TABLET ORAL
Qty: 15 | Refills: 0
Start: 2023-11-06 | End: 2023-11-10

## 2023-11-06 RX ORDER — ALPRAZOLAM 0.25 MG
1 TABLET ORAL
Refills: 0 | DISCHARGE

## 2023-11-06 RX ORDER — OXYCODONE HYDROCHLORIDE 5 MG/1
5 TABLET ORAL ONCE
Refills: 0 | Status: DISCONTINUED | OUTPATIENT
Start: 2023-11-06 | End: 2023-11-06

## 2023-11-06 RX ORDER — PHENAZOPYRIDINE HCL 100 MG
1 TABLET ORAL
Qty: 9 | Refills: 1
Start: 2023-11-06 | End: 2023-11-11

## 2023-11-06 RX ORDER — SODIUM CHLORIDE 9 MG/ML
1000 INJECTION, SOLUTION INTRAVENOUS
Refills: 0 | Status: DISCONTINUED | OUTPATIENT
Start: 2023-11-06 | End: 2023-11-06

## 2023-11-06 RX ORDER — DEXTROAMPHETAMINE SACCHARATE, AMPHETAMINE ASPARTATE, DEXTROAMPHETAMINE SULFATE AND AMPHETAMINE SULFATE 1.875; 1.875; 1.875; 1.875 MG/1; MG/1; MG/1; MG/1
1 TABLET ORAL
Refills: 0 | DISCHARGE

## 2023-11-06 RX ORDER — ALFUZOSIN HYDROCHLORIDE 10 MG/1
1 TABLET, EXTENDED RELEASE ORAL
Qty: 15 | Refills: 0
Start: 2023-11-06 | End: 2023-11-20

## 2023-11-06 RX ORDER — OXYBUTYNIN CHLORIDE 5 MG
1 TABLET ORAL
Qty: 15 | Refills: 0
Start: 2023-11-06 | End: 2023-11-10

## 2023-11-06 RX ORDER — FENTANYL CITRATE 50 UG/ML
50 INJECTION INTRAVENOUS
Refills: 0 | Status: DISCONTINUED | OUTPATIENT
Start: 2023-11-06 | End: 2023-11-06

## 2023-11-06 RX ORDER — CALCIUM CARBONATE 500(1250)
1 TABLET ORAL
Qty: 0 | Refills: 0 | DISCHARGE

## 2023-11-06 RX ORDER — ACETAMINOPHEN 500 MG
1000 TABLET ORAL ONCE
Refills: 0 | Status: COMPLETED | OUTPATIENT
Start: 2023-11-06 | End: 2023-11-06

## 2023-11-06 RX ADMIN — FENTANYL CITRATE 50 MICROGRAM(S): 50 INJECTION INTRAVENOUS at 14:02

## 2023-11-06 RX ADMIN — FENTANYL CITRATE 50 MICROGRAM(S): 50 INJECTION INTRAVENOUS at 14:12

## 2023-11-06 RX ADMIN — Medication 200 MILLIGRAM(S): at 14:02

## 2023-11-06 RX ADMIN — SODIUM CHLORIDE 125 MILLILITER(S): 9 INJECTION, SOLUTION INTRAVENOUS at 14:02

## 2023-11-06 RX ADMIN — OXYCODONE HYDROCHLORIDE 5 MILLIGRAM(S): 5 TABLET ORAL at 15:11

## 2023-11-06 RX ADMIN — Medication 400 MILLIGRAM(S): at 09:49

## 2023-11-06 RX ADMIN — FENTANYL CITRATE 50 MICROGRAM(S): 50 INJECTION INTRAVENOUS at 14:24

## 2023-11-06 RX ADMIN — OXYCODONE HYDROCHLORIDE 5 MILLIGRAM(S): 5 TABLET ORAL at 14:44

## 2023-11-06 NOTE — ASU DISCHARGE PLAN (ADULT/PEDIATRIC) - CARE PROVIDER_API CALL
Ray Collier  [address] [place] [address]  [address] [place] [address]    Tel: [phone], Option 4  Phone: (   )    -  Fax: (   )    -  Scheduled Appointment: 11/08/2023 09:40 AM

## 2023-11-06 NOTE — ASU DISCHARGE PLAN (ADULT/PEDIATRIC) - PAIN MANAGEMENT
Take Tylenol ES 2 tabs 3 times a day./Prescriptions electronically submitted to pharmacy from [facility]

## 2023-11-06 NOTE — PROGRESS NOTE ADULT - SUBJECTIVE AND OBJECTIVE BOX
Patient's mother by bedside.     Patient presented today for Transurethral resection of Bladder lesion.   Was c/o lot of right sided abdominal and back pain.   Had non obstructing right kidney stone, concern was that Patient may be passing kidney stone.   Obtained stat CT scan abdomen and pelvis which again showed 1 cm non obstructing right renal pelvic stone.   On review of images the stone appeared lower in position than October CT scan.   Discussed concern that stone may have caused obstruction but now is back in kidney.   Discussed treatment options.   Recommended to postpone Transurethral resection of Bladder lesions and instead do Cystoscopy and right ureteral stent placement and subsequent Ureteroscopy first as stone not seen on KUB part of CT scan ruling out ESWL.   Understands if does develop obstruction from kidney stone may need nephrostomy tube then.   Patient feeling better and wants to proceed with Transurethral resection of Bladder lesions.   Informed consent obtained.

## 2023-11-06 NOTE — BRIEF OPERATIVE NOTE - NSICDXBRIEFPROCEDURE_GEN_ALL_CORE_FT
clear liq up til 0945/yes PROCEDURES:  Transurethral surgical removal or destruction of large neoplasm or multiple neoplasms of bladder for more than 30 minutes 06-Nov-2023 13:54:53  Ray Collier

## 2023-11-06 NOTE — BRIEF OPERATIVE NOTE - OPERATION/FINDINGS
1. Erythematous and edematous bladder mucosa, more pronounced along left postero lateral bladder wall.  2. Erythematous, edematous and calcified (coarse) bladder mucosa along anterior bladder wall.

## 2023-11-08 ENCOUNTER — APPOINTMENT (OUTPATIENT)
Dept: UROLOGY | Facility: CLINIC | Age: 33
End: 2023-11-08

## 2023-11-08 ENCOUNTER — APPOINTMENT (OUTPATIENT)
Dept: NEPHROLOGY | Facility: CLINIC | Age: 33
End: 2023-11-08

## 2023-11-10 LAB
SURGICAL PATHOLOGY STUDY: SIGNIFICANT CHANGE UP
SURGICAL PATHOLOGY STUDY: SIGNIFICANT CHANGE UP

## 2023-11-13 DIAGNOSIS — F41.9 ANXIETY DISORDER, UNSPECIFIED: ICD-10-CM

## 2023-11-13 DIAGNOSIS — N32.89 OTHER SPECIFIED DISORDERS OF BLADDER: ICD-10-CM

## 2023-11-13 DIAGNOSIS — K21.9 GASTRO-ESOPHAGEAL REFLUX DISEASE WITHOUT ESOPHAGITIS: ICD-10-CM

## 2023-11-13 DIAGNOSIS — F17.210 NICOTINE DEPENDENCE, CIGARETTES, UNCOMPLICATED: ICD-10-CM

## 2023-11-13 DIAGNOSIS — N20.0 CALCULUS OF KIDNEY: ICD-10-CM

## 2023-11-13 DIAGNOSIS — Z80.51 FAMILY HISTORY OF MALIGNANT NEOPLASM OF KIDNEY: ICD-10-CM

## 2023-11-17 ENCOUNTER — APPOINTMENT (OUTPATIENT)
Dept: UROLOGY | Facility: CLINIC | Age: 33
End: 2023-11-17
Payer: COMMERCIAL

## 2023-11-17 DIAGNOSIS — N32.9 BLADDER DISORDER, UNSPECIFIED: ICD-10-CM

## 2023-11-17 DIAGNOSIS — N50.89 OTHER SPECIFIED DISORDERS OF THE MALE GENITAL ORGANS: ICD-10-CM

## 2023-11-17 PROCEDURE — 99214 OFFICE O/P EST MOD 30 MIN: CPT

## 2023-11-17 RX ORDER — METHENAMINE, SODIUM PHOSPHATE, MONOBASIC, MONOHYDRATE, PHENYL SALICYLATE, METHYLENE BLUE, AND HYOSCYAMINE SULFATE 118; 40.8; 36; 10; .12 MG/1; MG/1; MG/1; MG/1; MG/1
118 CAPSULE ORAL
Qty: 15 | Refills: 1 | Status: ACTIVE | COMMUNITY
Start: 2023-11-17 | End: 1900-01-01

## 2023-11-22 ENCOUNTER — APPOINTMENT (OUTPATIENT)
Dept: UROLOGY | Facility: CLINIC | Age: 33
End: 2023-11-22
Payer: COMMERCIAL

## 2023-11-22 ENCOUNTER — NON-APPOINTMENT (OUTPATIENT)
Age: 33
End: 2023-11-22

## 2023-11-22 DIAGNOSIS — R30.0 DYSURIA: ICD-10-CM

## 2023-11-22 LAB
BILIRUBIN URINE: NORMAL
BLOOD URINE: ABNORMAL
GLUCOSE QUALITATIVE U: NORMAL
KETONES URINE: ABNORMAL
LEUKOCYTE ESTERASE URINE: ABNORMAL
NITRITE URINE: NORMAL
PH URINE: 7
PROTEIN URINE: 100
SPECIFIC GRAVITY URINE: 1.02
UROBILINOGEN URINE: 0.2

## 2023-11-22 PROCEDURE — 99214 OFFICE O/P EST MOD 30 MIN: CPT | Mod: 25

## 2023-11-22 PROCEDURE — 51798 US URINE CAPACITY MEASURE: CPT

## 2023-11-22 RX ORDER — MIRABEGRON 50 MG/1
50 TABLET, FILM COATED, EXTENDED RELEASE ORAL DAILY
Qty: 7 | Refills: 0 | Status: ACTIVE | OUTPATIENT
Start: 2023-11-22

## 2023-11-22 RX ORDER — ZOLPIDEM TARTRATE 10 MG/1
10 TABLET ORAL
Qty: 30 | Refills: 0 | Status: ACTIVE | COMMUNITY
Start: 2023-11-22 | End: 1900-01-01

## 2023-11-22 RX ORDER — VIBEGRON 75 MG/1
75 TABLET, FILM COATED ORAL
Qty: 7 | Refills: 0 | Status: ACTIVE | OUTPATIENT
Start: 2023-11-22

## 2023-11-22 RX ORDER — MIRABEGRON 25 MG/1
25 TABLET, FILM COATED, EXTENDED RELEASE ORAL
Qty: 7 | Refills: 0 | Status: ACTIVE | OUTPATIENT
Start: 2023-11-22

## 2023-11-23 LAB
APPEARANCE: CLEAR
BACTERIA: NEGATIVE /HPF
BILIRUBIN URINE: NEGATIVE
BLOOD URINE: ABNORMAL
CAST: 0 /LPF
COLOR: YELLOW
EPITHELIAL CELLS: 0 /HPF
GLUCOSE QUALITATIVE U: NEGATIVE MG/DL
KETONES URINE: ABNORMAL MG/DL
LEUKOCYTE ESTERASE URINE: ABNORMAL
MICROSCOPIC-UA: NORMAL
NITRITE URINE: NEGATIVE
PH URINE: 7
PROTEIN URINE: 100 MG/DL
RED BLOOD CELLS URINE: 1 /HPF
REVIEW: NORMAL
SPECIFIC GRAVITY URINE: 1.02
UROBILINOGEN URINE: 0.2 MG/DL
WHITE BLOOD CELLS URINE: 3 /HPF

## 2023-11-24 LAB — BACTERIA UR CULT: NORMAL

## 2023-11-28 LAB — URINE CYTOLOGY: NORMAL

## 2023-12-09 PROBLEM — N50.89 PAIN OF MALE GENITALIA: Status: ACTIVE | Noted: 2022-11-04

## 2023-12-09 PROBLEM — N32.9 LESION OF BLADDER: Status: ACTIVE | Noted: 2023-10-17

## 2023-12-12 RX ORDER — SOLIFENACIN SUCCINATE 10 MG/1
10 TABLET ORAL
Qty: 90 | Refills: 1 | Status: ACTIVE | COMMUNITY
Start: 2023-10-04 | End: 1900-01-01

## 2023-12-13 ENCOUNTER — APPOINTMENT (OUTPATIENT)
Dept: UROLOGY | Facility: CLINIC | Age: 33
End: 2023-12-13
Payer: COMMERCIAL

## 2023-12-13 DIAGNOSIS — R39.9 UNSPECIFIED SYMPTOMS AND SIGNS INVOLVING THE GENITOURINARY SYSTEM: ICD-10-CM

## 2023-12-13 DIAGNOSIS — R35.0 FREQUENCY OF MICTURITION: ICD-10-CM

## 2023-12-13 PROCEDURE — 99213 OFFICE O/P EST LOW 20 MIN: CPT

## 2023-12-13 NOTE — PHYSICAL EXAM
[Normal Appearance] : normal appearance [Well Groomed] : well groomed [General Appearance - In No Acute Distress] : no acute distress [Edema] : no peripheral edema [] : no respiratory distress [Respiration, Rhythm And Depth] : normal respiratory rhythm and effort [Exaggerated Use Of Accessory Muscles For Inspiration] : no accessory muscle use [Abdomen Tenderness] : non-tender [Costovertebral Angle Tenderness] : no ~M costovertebral angle tenderness [Urinary Bladder Findings] : the bladder was normal on palpation [Normal Station and Gait] : the gait and station were normal for the patient's age [No Focal Deficits] : no focal deficits [Oriented To Time, Place, And Person] : oriented to person, place, and time [Affect] : the affect was normal [Mood] : the mood was normal

## 2023-12-13 NOTE — ASSESSMENT
[FreeTextEntry1] : Labs are obtained. He will be referred to pelvic floor dysfunction physical therapy.

## 2023-12-13 NOTE — HISTORY OF PRESENT ILLNESS
[FreeTextEntry1] : This patient presents with passage of some sediments and brought it in for stone analysis. He continues to have some dysuria and pelvic pressure. He denies any other urinary complaints.

## 2023-12-14 LAB
APPEARANCE: ABNORMAL
BACTERIA: NEGATIVE /HPF
BILIRUBIN URINE: NEGATIVE
BLOOD URINE: ABNORMAL
CAST: NORMAL /LPF
COLOR: YELLOW
EPITHELIAL CELLS: 0 /HPF
GLUCOSE QUALITATIVE U: NEGATIVE MG/DL
KETONES URINE: NEGATIVE MG/DL
LEUKOCYTE ESTERASE URINE: ABNORMAL
MICROSCOPIC-UA: NORMAL
NITRITE URINE: NEGATIVE
PH URINE: 7.5
PROTEIN URINE: 100 MG/DL
RED BLOOD CELLS URINE: 156 /HPF
REVIEW: NORMAL
SPECIFIC GRAVITY URINE: 1.02
UROBILINOGEN URINE: 0.2 MG/DL
WHITE BLOOD CELLS URINE: 208 /HPF

## 2023-12-15 ENCOUNTER — APPOINTMENT (OUTPATIENT)
Dept: UROLOGY | Facility: CLINIC | Age: 33
End: 2023-12-15

## 2023-12-15 LAB
BACTERIA UR CULT: NORMAL
URINE CYTOLOGY: NORMAL

## 2023-12-19 LAB
KIDNEY STONE SOURCE: NORMAL
NIDUS STONE QN: NORMAL
RESULT COMMENT: NORMAL

## 2023-12-22 RX ORDER — OXYCODONE AND ACETAMINOPHEN 5; 325 MG/1; MG/1
5-325 TABLET ORAL
Qty: 16 | Refills: 0 | Status: COMPLETED | COMMUNITY
Start: 2023-11-07 | End: 2023-12-22

## 2023-12-22 RX ORDER — OXYCODONE AND ACETAMINOPHEN 5; 325 MG/1; MG/1
5-325 TABLET ORAL
Qty: 20 | Refills: 0 | Status: COMPLETED | COMMUNITY
Start: 2023-11-22 | End: 2023-12-22

## 2023-12-27 RX ORDER — HYDROCHLOROTHIAZIDE 12.5 MG/1
12.5 TABLET ORAL
Qty: 90 | Refills: 3 | Status: ACTIVE | COMMUNITY
Start: 2023-12-27 | End: 1900-01-01

## 2023-12-27 RX ORDER — ALLOPURINOL 100 MG/1
100 TABLET ORAL
Qty: 90 | Refills: 3 | Status: ACTIVE | COMMUNITY
Start: 2023-12-27 | End: 1900-01-01

## 2023-12-27 RX ORDER — POTASSIUM CHLORIDE 750 MG/1
10 CAPSULE, EXTENDED RELEASE ORAL TWICE DAILY
Qty: 180 | Refills: 3 | Status: ACTIVE | COMMUNITY
Start: 2023-12-27 | End: 1900-01-01

## 2024-01-12 ENCOUNTER — APPOINTMENT (OUTPATIENT)
Dept: UROLOGY | Facility: CLINIC | Age: 34
End: 2024-01-12
Payer: COMMERCIAL

## 2024-01-12 DIAGNOSIS — N30.90 CYSTITIS, UNSPECIFIED W/OUT HEMATURIA: ICD-10-CM

## 2024-01-12 DIAGNOSIS — N20.0 CALCULUS OF KIDNEY: ICD-10-CM

## 2024-01-12 DIAGNOSIS — N30.10 INTERSTITIAL CYSTITIS (CHRONIC) W/OUT HEMATURIA: ICD-10-CM

## 2024-01-12 DIAGNOSIS — R31.0 GROSS HEMATURIA: ICD-10-CM

## 2024-01-12 PROCEDURE — 99214 OFFICE O/P EST MOD 30 MIN: CPT

## 2024-01-13 PROBLEM — R31.0 GROSS HEMATURIA: Status: ACTIVE | Noted: 2023-01-22

## 2024-01-13 PROBLEM — N20.0 KIDNEY STONE ON RIGHT SIDE: Status: ACTIVE | Noted: 2023-01-13

## 2024-01-13 LAB
APPEARANCE: CLEAR
BACTERIA: NEGATIVE /HPF
BILIRUBIN URINE: NEGATIVE
BLOOD URINE: ABNORMAL
CAST: 0 /LPF
COLOR: YELLOW
EPITHELIAL CELLS: 0 /HPF
GLUCOSE QUALITATIVE U: NEGATIVE MG/DL
KETONES URINE: ABNORMAL MG/DL
LEUKOCYTE ESTERASE URINE: ABNORMAL
MICROSCOPIC-UA: NORMAL
NITRITE URINE: NEGATIVE
PH URINE: 7.5
PROTEIN URINE: 30 MG/DL
RED BLOOD CELLS URINE: 401 /HPF
SPECIFIC GRAVITY URINE: 1.02
UROBILINOGEN URINE: 0.2 MG/DL
WHITE BLOOD CELLS URINE: 14 /HPF

## 2024-01-14 LAB — BACTERIA UR CULT: NORMAL

## 2024-01-17 NOTE — HISTORY OF PRESENT ILLNESS
[FreeTextEntry1] : 09/20/2023 32 year old male presents for lower urinary tract symptoms.  Called office a few days ago complaining of pain discomfort with urination. Was asked to drop off urine and start Ciprofloxacin.  Patient dropped off urine yesterday, 09/19/2023 and has been taking Ciprofloxacin. Mentions that has on and off dysuria, denies gross hematuria but occasionally passes blood clot.  Mentions that urination has worsened. Daytime frequencies every 45 minutes to an hour. Nocturia is about hour and a half.  Has slower flow and sense of incomplete emptying.  Urination does get worse after sexual activity. Patient mentions that on Ciprofloxacin symptoms do improve.   Patient saw nephrologist and was told that no concern.  Patient in January also saw another urologist, Dr. Hummel at [facility]. Had a repeat CT scan which showed persistent kidney stone. Patient chose to observe.   Status post Cystoscopy and bladder biopsy with fulguration on 10/18/21 at [facility].  Pathology: Inflammation, granulation and reactive changes C/W Interstital Cystitis   1/12/24 - This 32 yo male is here s/p bladder surgery done Nov 6, 2023 in [facility] due to hx of multiple UTI's, renal stones and IC  Operative Findings:  1. Erythematous and edematous bladder mucosa, more pronounced along left posterior  lateral bladder wall. 2. Erythematous, edematous and calcified (coarse) bladder mucosa along anterior bladder wall.  Maintained on Alfuzosin Pt reports slow recovery but improving daily. Remains with dysuria hematuria and testicular pain. Still passing "dozens" of calculi. Pain is 5-6, daily average. Takes Ibuprofen only. Nocturia q3-4 h. and less frequency during the day   SH:  Drug use in his 20's including hallucinogens and he is "pretty sure" Ketamine. Tobacco previously and now vaping daily  Reviewed last CT scan - had 7mm stone right kidney and calcified bladder wall.

## 2024-01-17 NOTE — ASSESSMENT
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 he feels the frequency improving so no role for anticholinergics. he asked about managing the renal stone - think can wait as think wedged in the calyx and won't be moving. will review with RM/

## 2024-01-29 ENCOUNTER — APPOINTMENT (OUTPATIENT)
Dept: UROLOGY | Facility: CLINIC | Age: 34
End: 2024-01-29

## 2024-03-04 RX ORDER — PENTOSAN POLYSULFATE SODIUM 100 MG/1
100 CAPSULE, GELATIN COATED ORAL 3 TIMES DAILY
Qty: 90 | Refills: 3 | Status: ACTIVE | COMMUNITY
Start: 2024-01-12 | End: 1900-01-01

## 2024-03-10 PROBLEM — N30.10 INTERSTITIAL CYSTITIS: Status: ACTIVE | Noted: 2024-01-17

## 2024-03-27 ENCOUNTER — APPOINTMENT (OUTPATIENT)
Dept: UROLOGY | Facility: CLINIC | Age: 34
End: 2024-03-27

## 2024-03-31 NOTE — DISCHARGE NOTE NURSING/CASE MANAGEMENT/SOCIAL WORK - PATIENT PORTAL LINK FT
You can access the FollowMyHealth Patient Portal offered by [facility] by registering at the following website: [email or web address]. By joining [employer]’s FollowMyHealth portal, you will also be able to view your health information using other applications (apps) compatible with our system. Oriented - self; Oriented - place; Oriented - time

## 2024-05-06 NOTE — ED ADULT NURSE NOTE - IS THE PATIENT ABLE TO BE SCREENED?
Nurses Note -- 4 Eyes      5/6/2024   5:35 PM      Skin assessed during: Q Shift Change      [x] No Altered Skin Integrity Present    []Prevention Measures Documented      [] Yes- Altered Skin Integrity Present or Discovered   [] LDA Added if Not in Epic (Describe Wound)   [] New Altered Skin Integrity was Present on Admit and Documented in LDA   [] Wound Image Taken    Wound Care Consulted? No    Attending Nurse:  Sudha Ivory RN/Staff Member:             Yes

## 2024-06-26 ENCOUNTER — APPOINTMENT (OUTPATIENT)
Dept: UROLOGY | Facility: CLINIC | Age: 34
End: 2024-06-26

## 2024-09-03 NOTE — ASU PATIENT PROFILE, ADULT - TEACHING/LEARNING RELIGIOUS CONSIDERATIONS
PROGRESS NOTE   [facility]                                  [address], [address], [place] [address]         Phone: [phone]    Date of Service:  9/3/2024     Patient ID: Gordon Weeks is a 53 y.o. male      Subjective:     CC: Medical follow up    HPI    Patient is a 53 year-old male with pmh of anxiety, prediabetes, HLD and former tobacco user who presents for follow up. Patient states that his mood and anxiety have improved since having his daughter back from the hospital. He denies any SI/HI. Patient's only medication is Lexapro 20 mg daily which he is tolerating with no side effects.  He denies any relapse of smoking. Patient is due for colonoscopy and was previously given referral with Dr. Bess but has not set up appointment as he has had other ongoing concerns.    ROS:    Constitutional:  Negative for activity or appetite change, fever or fatigue  HENT:  Negative for congestion, sinus pressure, or rhinorrhea  Eyes:  Negative for eye pain or visual changes  Resp:  Negative for SOB, chest tightness, cough  Cardiovascular: Negative for CP, palpitations, SANTOS, orthopnea, PND, LE edema  Gastrointestinal: Negative for abd pain, melena, BRBPR, N/V/D  Endocrine:  Negative for polydipsia and polyuria  :  Negative for dysuria, flank pain or urinary frequency  Musculoskeletal:  Negative for back pain or myalgias  Neuro:  Negative for dizziness or lightheadedness  Psych: negative for depression or anxiety        Vitals:    09/03/24 1113   BP: 116/76   Site: Left Upper Arm   Position: Sitting   Cuff Size: Large Adult   Pulse: 70   Temp: 98.2 °F (36.8 °C)   TempSrc: Oral   SpO2: 96%   Weight: 127 kg (280 lb)       Allergies:  Patient has no known allergies.    No outpatient medications have been marked as taking for the 9/3/24 encounter (Office Visit) with Lefty Altman MD.         Objective:   Constitutional:  none

## 2025-01-24 NOTE — ASU PATIENT PROFILE, ADULT - WILL THE PATIENT ACCEPT THE PFIZER COVID-19 VACCINE IF ELIGIBLE AND IT IS AVAILABLE?
Not applicable

## 2025-05-27 ENCOUNTER — INPATIENT (INPATIENT)
Facility: HOSPITAL | Age: 35
LOS: 1 days | Discharge: HOME | DRG: 694 | End: 2025-05-29
Attending: INTERNAL MEDICINE | Admitting: HOSPITALIST
Payer: COMMERCIAL

## 2025-05-27 VITALS
WEIGHT: 194.01 LBS | OXYGEN SATURATION: 98 % | DIASTOLIC BLOOD PRESSURE: 98 MMHG | SYSTOLIC BLOOD PRESSURE: 164 MMHG | HEART RATE: 118 BPM | TEMPERATURE: 98 F | RESPIRATION RATE: 18 BRPM

## 2025-05-27 DIAGNOSIS — Z98.890 OTHER SPECIFIED POSTPROCEDURAL STATES: Chronic | ICD-10-CM

## 2025-05-27 DIAGNOSIS — Z90.49 ACQUIRED ABSENCE OF OTHER SPECIFIED PARTS OF DIGESTIVE TRACT: Chronic | ICD-10-CM

## 2025-05-27 PROCEDURE — 99285 EMERGENCY DEPT VISIT HI MDM: CPT

## 2025-05-27 NOTE — ED ADULT TRIAGE NOTE - CHIEF COMPLAINT QUOTE
34yM AOx4 ambulatory, presents to  ED c/o RLQ cramping intermittently for  weeks. pt endorsing N/V, chills. pt denies diarrhea. PMH gallbladder removal. advil taken at 2200.

## 2025-05-28 DIAGNOSIS — N20.0 CALCULUS OF KIDNEY: ICD-10-CM

## 2025-05-28 DIAGNOSIS — N20.1 CALCULUS OF URETER: ICD-10-CM

## 2025-05-28 DIAGNOSIS — N39.0 URINARY TRACT INFECTION, SITE NOT SPECIFIED: ICD-10-CM

## 2025-05-28 LAB
ALBUMIN SERPL ELPH-MCNC: 3.1 G/DL — LOW (ref 3.3–5)
ALBUMIN SERPL ELPH-MCNC: 3.7 G/DL — SIGNIFICANT CHANGE UP (ref 3.3–5)
ALP SERPL-CCNC: 118 U/L — SIGNIFICANT CHANGE UP (ref 40–120)
ALP SERPL-CCNC: 95 U/L — SIGNIFICANT CHANGE UP (ref 40–120)
ALT FLD-CCNC: 32 U/L — SIGNIFICANT CHANGE UP (ref 12–78)
ALT FLD-CCNC: 37 U/L — SIGNIFICANT CHANGE UP (ref 12–78)
ANION GAP SERPL CALC-SCNC: 4 MMOL/L — LOW (ref 5–17)
ANION GAP SERPL CALC-SCNC: 6 MMOL/L — SIGNIFICANT CHANGE UP (ref 5–17)
APPEARANCE UR: CLEAR — SIGNIFICANT CHANGE UP
AST SERPL-CCNC: 24 U/L — SIGNIFICANT CHANGE UP (ref 15–37)
AST SERPL-CCNC: 25 U/L — SIGNIFICANT CHANGE UP (ref 15–37)
BACTERIA # UR AUTO: NEGATIVE /HPF — SIGNIFICANT CHANGE UP
BASOPHILS # BLD AUTO: 0.02 K/UL — SIGNIFICANT CHANGE UP (ref 0–0.2)
BASOPHILS NFR BLD AUTO: 0.2 % — SIGNIFICANT CHANGE UP (ref 0–2)
BILIRUB SERPL-MCNC: 0.1 MG/DL — LOW (ref 0.2–1.2)
BILIRUB SERPL-MCNC: 0.2 MG/DL — SIGNIFICANT CHANGE UP (ref 0.2–1.2)
BILIRUB UR-MCNC: NEGATIVE — SIGNIFICANT CHANGE UP
BUN SERPL-MCNC: 11 MG/DL — SIGNIFICANT CHANGE UP (ref 7–23)
BUN SERPL-MCNC: 9 MG/DL — SIGNIFICANT CHANGE UP (ref 7–23)
CALCIUM SERPL-MCNC: 8.1 MG/DL — LOW (ref 8.5–10.1)
CALCIUM SERPL-MCNC: 9.2 MG/DL — SIGNIFICANT CHANGE UP (ref 8.5–10.1)
CAST: 3 /LPF — SIGNIFICANT CHANGE UP (ref 0–4)
CHLORIDE SERPL-SCNC: 108 MMOL/L — SIGNIFICANT CHANGE UP (ref 96–108)
CHLORIDE SERPL-SCNC: 110 MMOL/L — HIGH (ref 96–108)
CO2 SERPL-SCNC: 25 MMOL/L — SIGNIFICANT CHANGE UP (ref 22–31)
CO2 SERPL-SCNC: 28 MMOL/L — SIGNIFICANT CHANGE UP (ref 22–31)
COLOR SPEC: YELLOW — SIGNIFICANT CHANGE UP
CREAT SERPL-MCNC: 1.02 MG/DL — SIGNIFICANT CHANGE UP (ref 0.5–1.3)
CREAT SERPL-MCNC: 1.26 MG/DL — SIGNIFICANT CHANGE UP (ref 0.5–1.3)
DIFF PNL FLD: ABNORMAL
EGFR: 100 ML/MIN/1.73M2 — SIGNIFICANT CHANGE UP
EGFR: 100 ML/MIN/1.73M2 — SIGNIFICANT CHANGE UP
EGFR: 77 ML/MIN/1.73M2 — SIGNIFICANT CHANGE UP
EGFR: 77 ML/MIN/1.73M2 — SIGNIFICANT CHANGE UP
EOSINOPHIL # BLD AUTO: 0.02 K/UL — SIGNIFICANT CHANGE UP (ref 0–0.5)
EOSINOPHIL NFR BLD AUTO: 0.2 % — SIGNIFICANT CHANGE UP (ref 0–6)
GLUCOSE SERPL-MCNC: 118 MG/DL — HIGH (ref 70–99)
GLUCOSE SERPL-MCNC: 93 MG/DL — SIGNIFICANT CHANGE UP (ref 70–99)
GLUCOSE UR QL: NEGATIVE MG/DL — SIGNIFICANT CHANGE UP
HCT VFR BLD CALC: 26.5 % — LOW (ref 39–50)
HCT VFR BLD CALC: 31.7 % — LOW (ref 39–50)
HGB BLD-MCNC: 8.3 G/DL — LOW (ref 13–17)
HGB BLD-MCNC: 9.9 G/DL — LOW (ref 13–17)
IMM GRANULOCYTES # BLD AUTO: 0.03 K/UL — SIGNIFICANT CHANGE UP (ref 0–0.07)
IMM GRANULOCYTES NFR BLD AUTO: 0.3 % — SIGNIFICANT CHANGE UP (ref 0–0.9)
KETONES UR QL: ABNORMAL MG/DL
LEUKOCYTE ESTERASE UR-ACNC: ABNORMAL
LIDOCAIN IGE QN: 18 U/L — SIGNIFICANT CHANGE UP (ref 13–75)
LYMPHOCYTES # BLD AUTO: 2.49 K/UL — SIGNIFICANT CHANGE UP (ref 1–3.3)
LYMPHOCYTES NFR BLD AUTO: 21.2 % — SIGNIFICANT CHANGE UP (ref 13–44)
MAGNESIUM SERPL-MCNC: 1.8 MG/DL — SIGNIFICANT CHANGE UP (ref 1.6–2.6)
MCHC RBC-ENTMCNC: 25.1 PG — LOW (ref 27–34)
MCHC RBC-ENTMCNC: 25.2 PG — LOW (ref 27–34)
MCHC RBC-ENTMCNC: 31.2 G/DL — LOW (ref 32–36)
MCHC RBC-ENTMCNC: 31.3 G/DL — LOW (ref 32–36)
MCV RBC AUTO: 80.3 FL — SIGNIFICANT CHANGE UP (ref 80–100)
MCV RBC AUTO: 80.3 FL — SIGNIFICANT CHANGE UP (ref 80–100)
MONOCYTES # BLD AUTO: 0.73 K/UL — SIGNIFICANT CHANGE UP (ref 0–0.9)
MONOCYTES NFR BLD AUTO: 6.2 % — SIGNIFICANT CHANGE UP (ref 2–14)
NEUTROPHILS # BLD AUTO: 8.44 K/UL — HIGH (ref 1.8–7.4)
NEUTROPHILS NFR BLD AUTO: 71.9 % — SIGNIFICANT CHANGE UP (ref 43–77)
NITRITE UR-MCNC: NEGATIVE — SIGNIFICANT CHANGE UP
NRBC # BLD AUTO: 0 K/UL — SIGNIFICANT CHANGE UP (ref 0–0)
NRBC # BLD AUTO: 0 K/UL — SIGNIFICANT CHANGE UP (ref 0–0)
NRBC # FLD: 0 K/UL — SIGNIFICANT CHANGE UP (ref 0–0)
NRBC # FLD: 0 K/UL — SIGNIFICANT CHANGE UP (ref 0–0)
NRBC BLD AUTO-RTO: 0 /100 WBCS — SIGNIFICANT CHANGE UP (ref 0–0)
NRBC BLD AUTO-RTO: 0 /100 WBCS — SIGNIFICANT CHANGE UP (ref 0–0)
PH UR: 7 — SIGNIFICANT CHANGE UP (ref 5–8)
PHOSPHATE SERPL-MCNC: 2.3 MG/DL — LOW (ref 2.5–4.5)
PLATELET # BLD AUTO: 376 K/UL — SIGNIFICANT CHANGE UP (ref 150–400)
PLATELET # BLD AUTO: 537 K/UL — HIGH (ref 150–400)
PMV BLD: 9.7 FL — SIGNIFICANT CHANGE UP (ref 7–13)
PMV BLD: 9.9 FL — SIGNIFICANT CHANGE UP (ref 7–13)
POTASSIUM SERPL-MCNC: 3.4 MMOL/L — LOW (ref 3.5–5.3)
POTASSIUM SERPL-MCNC: 4.3 MMOL/L — SIGNIFICANT CHANGE UP (ref 3.5–5.3)
POTASSIUM SERPL-SCNC: 3.4 MMOL/L — LOW (ref 3.5–5.3)
POTASSIUM SERPL-SCNC: 4.3 MMOL/L — SIGNIFICANT CHANGE UP (ref 3.5–5.3)
PROT SERPL-MCNC: 6.4 GM/DL — SIGNIFICANT CHANGE UP (ref 6–8.3)
PROT SERPL-MCNC: 7.9 GM/DL — SIGNIFICANT CHANGE UP (ref 6–8.3)
PROT UR-MCNC: 100 MG/DL
RBC # BLD: 3.3 M/UL — LOW (ref 4.2–5.8)
RBC # BLD: 3.95 M/UL — LOW (ref 4.2–5.8)
RBC # FLD: 13 % — SIGNIFICANT CHANGE UP (ref 10.3–14.5)
RBC # FLD: 13.1 % — SIGNIFICANT CHANGE UP (ref 10.3–14.5)
RBC CASTS # UR COMP ASSIST: 412 /HPF — HIGH (ref 0–4)
SODIUM SERPL-SCNC: 140 MMOL/L — SIGNIFICANT CHANGE UP (ref 135–145)
SODIUM SERPL-SCNC: 141 MMOL/L — SIGNIFICANT CHANGE UP (ref 135–145)
SP GR SPEC: >1.03 — HIGH (ref 1–1.03)
SQUAMOUS # UR AUTO: 1 /HPF — SIGNIFICANT CHANGE UP (ref 0–5)
UROBILINOGEN FLD QL: 1 MG/DL — SIGNIFICANT CHANGE UP (ref 0.2–1)
WBC # BLD: 11.73 K/UL — HIGH (ref 3.8–10.5)
WBC # BLD: 9.36 K/UL — SIGNIFICANT CHANGE UP (ref 3.8–10.5)
WBC # FLD AUTO: 11.73 K/UL — HIGH (ref 3.8–10.5)
WBC # FLD AUTO: 9.36 K/UL — SIGNIFICANT CHANGE UP (ref 3.8–10.5)
WBC UR QL: 80 /HPF — HIGH (ref 0–5)

## 2025-05-28 PROCEDURE — C2617: CPT

## 2025-05-28 PROCEDURE — 84100 ASSAY OF PHOSPHORUS: CPT

## 2025-05-28 PROCEDURE — 36415 COLL VENOUS BLD VENIPUNCTURE: CPT

## 2025-05-28 PROCEDURE — C1769: CPT

## 2025-05-28 PROCEDURE — 76000 FLUOROSCOPY <1 HR PHYS/QHP: CPT

## 2025-05-28 PROCEDURE — 80069 RENAL FUNCTION PANEL: CPT

## 2025-05-28 PROCEDURE — 83735 ASSAY OF MAGNESIUM: CPT

## 2025-05-28 PROCEDURE — 80053 COMPREHEN METABOLIC PANEL: CPT

## 2025-05-28 PROCEDURE — 85027 COMPLETE CBC AUTOMATED: CPT

## 2025-05-28 PROCEDURE — 12345: CPT | Mod: NC

## 2025-05-28 PROCEDURE — 52332 CYSTOSCOPY AND TREATMENT: CPT | Mod: RT

## 2025-05-28 PROCEDURE — 99222 1ST HOSP IP/OBS MODERATE 55: CPT

## 2025-05-28 PROCEDURE — 74177 CT ABD & PELVIS W/CONTRAST: CPT | Mod: 26

## 2025-05-28 RX ORDER — ONDANSETRON HCL/PF 4 MG/2 ML
4 VIAL (ML) INJECTION ONCE
Refills: 0 | Status: COMPLETED | OUTPATIENT
Start: 2025-05-28 | End: 2025-05-28

## 2025-05-28 RX ORDER — PHENAZOPYRIDINE HCL 100 MG
200 TABLET ORAL ONCE
Refills: 0 | Status: COMPLETED | OUTPATIENT
Start: 2025-05-28 | End: 2025-05-28

## 2025-05-28 RX ORDER — ONDANSETRON HCL/PF 4 MG/2 ML
4 VIAL (ML) INJECTION ONCE
Refills: 0 | Status: DISCONTINUED | OUTPATIENT
Start: 2025-05-28 | End: 2025-05-28

## 2025-05-28 RX ORDER — HYDROMORPHONE/SOD CHLOR,ISO/PF 2 MG/10 ML
0.2 SYRINGE (ML) INJECTION EVERY 4 HOURS
Refills: 0 | Status: DISCONTINUED | OUTPATIENT
Start: 2025-05-28 | End: 2025-05-29

## 2025-05-28 RX ORDER — PHENAZOPYRIDINE HCL 100 MG
200 TABLET ORAL THREE TIMES A DAY
Refills: 0 | Status: DISCONTINUED | OUTPATIENT
Start: 2025-05-28 | End: 2025-05-29

## 2025-05-28 RX ORDER — CIPROFLOXACIN HCL 250 MG
400 TABLET ORAL ONCE
Refills: 0 | Status: COMPLETED | OUTPATIENT
Start: 2025-05-28 | End: 2025-05-28

## 2025-05-28 RX ORDER — FENTANYL CITRATE-0.9 % NACL/PF 100MCG/2ML
50 SYRINGE (ML) INTRAVENOUS
Refills: 0 | Status: DISCONTINUED | OUTPATIENT
Start: 2025-05-28 | End: 2025-05-28

## 2025-05-28 RX ORDER — MELATONIN 5 MG
3 TABLET ORAL AT BEDTIME
Refills: 0 | Status: DISCONTINUED | OUTPATIENT
Start: 2025-05-28 | End: 2025-05-29

## 2025-05-28 RX ORDER — HYDROMORPHONE/SOD CHLOR,ISO/PF 2 MG/10 ML
0.5 SYRINGE (ML) INJECTION EVERY 4 HOURS
Refills: 0 | Status: DISCONTINUED | OUTPATIENT
Start: 2025-05-28 | End: 2025-05-29

## 2025-05-28 RX ORDER — NALOXONE HYDROCHLORIDE 0.4 MG/ML
0.4 INJECTION, SOLUTION INTRAMUSCULAR; INTRAVENOUS; SUBCUTANEOUS ONCE
Refills: 0 | Status: DISCONTINUED | OUTPATIENT
Start: 2025-05-28 | End: 2025-05-29

## 2025-05-28 RX ORDER — OXYBUTYNIN CHLORIDE 5 MG/1
5 TABLET, FILM COATED, EXTENDED RELEASE ORAL ONCE
Refills: 0 | Status: COMPLETED | OUTPATIENT
Start: 2025-05-28 | End: 2025-05-28

## 2025-05-28 RX ORDER — OXYCODONE HYDROCHLORIDE 30 MG/1
5 TABLET ORAL ONCE
Refills: 0 | Status: DISCONTINUED | OUTPATIENT
Start: 2025-05-28 | End: 2025-05-28

## 2025-05-28 RX ORDER — ACETAMINOPHEN 500 MG/5ML
650 LIQUID (ML) ORAL EVERY 6 HOURS
Refills: 0 | Status: DISCONTINUED | OUTPATIENT
Start: 2025-05-28 | End: 2025-05-28

## 2025-05-28 RX ORDER — MAGNESIUM, ALUMINUM HYDROXIDE 200-200 MG
30 TABLET,CHEWABLE ORAL EVERY 4 HOURS
Refills: 0 | Status: DISCONTINUED | OUTPATIENT
Start: 2025-05-28 | End: 2025-05-29

## 2025-05-28 RX ORDER — ACETAMINOPHEN 500 MG/5ML
650 LIQUID (ML) ORAL EVERY 6 HOURS
Refills: 0 | Status: DISCONTINUED | OUTPATIENT
Start: 2025-05-28 | End: 2025-05-29

## 2025-05-28 RX ORDER — OXYBUTYNIN CHLORIDE 5 MG/1
5 TABLET, FILM COATED, EXTENDED RELEASE ORAL THREE TIMES A DAY
Refills: 0 | Status: DISCONTINUED | OUTPATIENT
Start: 2025-05-28 | End: 2025-05-29

## 2025-05-28 RX ORDER — HYDROMORPHONE/SOD CHLOR,ISO/PF 2 MG/10 ML
1 SYRINGE (ML) INJECTION ONCE
Refills: 0 | Status: DISCONTINUED | OUTPATIENT
Start: 2025-05-28 | End: 2025-05-28

## 2025-05-28 RX ORDER — POLYETHYLENE GLYCOL 3350 17 G/17G
17 POWDER, FOR SOLUTION ORAL DAILY
Refills: 0 | Status: DISCONTINUED | OUTPATIENT
Start: 2025-05-28 | End: 2025-05-29

## 2025-05-28 RX ORDER — BISACODYL 5 MG
5 TABLET, DELAYED RELEASE (ENTERIC COATED) ORAL DAILY
Refills: 0 | Status: DISCONTINUED | OUTPATIENT
Start: 2025-05-28 | End: 2025-05-29

## 2025-05-28 RX ORDER — ONDANSETRON HCL/PF 4 MG/2 ML
4 VIAL (ML) INJECTION EVERY 8 HOURS
Refills: 0 | Status: DISCONTINUED | OUTPATIENT
Start: 2025-05-28 | End: 2025-05-29

## 2025-05-28 RX ORDER — KETOROLAC TROMETHAMINE 30 MG/ML
30 INJECTION, SOLUTION INTRAMUSCULAR; INTRAVENOUS ONCE
Refills: 0 | Status: DISCONTINUED | OUTPATIENT
Start: 2025-05-28 | End: 2025-05-28

## 2025-05-28 RX ORDER — ONDANSETRON HCL/PF 4 MG/2 ML
4 VIAL (ML) INJECTION EVERY 6 HOURS
Refills: 0 | Status: DISCONTINUED | OUTPATIENT
Start: 2025-05-28 | End: 2025-05-28

## 2025-05-28 RX ORDER — SENNA 187 MG
2 TABLET ORAL AT BEDTIME
Refills: 0 | Status: DISCONTINUED | OUTPATIENT
Start: 2025-05-28 | End: 2025-05-29

## 2025-05-28 RX ORDER — SODIUM CHLORIDE 9 G/1000ML
1000 INJECTION, SOLUTION INTRAVENOUS
Refills: 0 | Status: DISCONTINUED | OUTPATIENT
Start: 2025-05-28 | End: 2025-05-29

## 2025-05-28 RX ORDER — CEFTRIAXONE 500 MG/1
2000 INJECTION, POWDER, FOR SOLUTION INTRAMUSCULAR; INTRAVENOUS EVERY 24 HOURS
Refills: 0 | Status: DISCONTINUED | OUTPATIENT
Start: 2025-05-28 | End: 2025-05-29

## 2025-05-28 RX ORDER — SOD PHOS DI, MONO/K PHOS MONO 250 MG
2 TABLET ORAL EVERY 4 HOURS
Refills: 0 | Status: COMPLETED | OUTPATIENT
Start: 2025-05-28 | End: 2025-05-28

## 2025-05-28 RX ORDER — TAMSULOSIN HYDROCHLORIDE 0.4 MG/1
0.8 CAPSULE ORAL AT BEDTIME
Refills: 0 | Status: DISCONTINUED | OUTPATIENT
Start: 2025-05-28 | End: 2025-05-29

## 2025-05-28 RX ADMIN — Medication 650 MILLIGRAM(S): at 10:30

## 2025-05-28 RX ADMIN — Medication 650 MILLIGRAM(S): at 10:06

## 2025-05-28 RX ADMIN — OXYBUTYNIN CHLORIDE 5 MILLIGRAM(S): 5 TABLET, FILM COATED, EXTENDED RELEASE ORAL at 18:49

## 2025-05-28 RX ADMIN — Medication 0.5 MILLIGRAM(S): at 16:57

## 2025-05-28 RX ADMIN — Medication 0.2 MILLIGRAM(S): at 13:55

## 2025-05-28 RX ADMIN — KETOROLAC TROMETHAMINE 30 MILLIGRAM(S): 30 INJECTION, SOLUTION INTRAMUSCULAR; INTRAVENOUS at 01:49

## 2025-05-28 RX ADMIN — Medication 2 TABLET(S): at 21:08

## 2025-05-28 RX ADMIN — Medication 0.5 MILLIGRAM(S): at 11:00

## 2025-05-28 RX ADMIN — Medication 200 MILLIGRAM(S): at 18:49

## 2025-05-28 RX ADMIN — Medication 2000 MILLILITER(S): at 03:32

## 2025-05-28 RX ADMIN — Medication 1 MILLIGRAM(S): at 03:32

## 2025-05-28 RX ADMIN — TAMSULOSIN HYDROCHLORIDE 0.8 MILLIGRAM(S): 0.4 CAPSULE ORAL at 04:09

## 2025-05-28 RX ADMIN — Medication 0.5 MILLIGRAM(S): at 10:30

## 2025-05-28 RX ADMIN — TAMSULOSIN HYDROCHLORIDE 0.8 MILLIGRAM(S): 0.4 CAPSULE ORAL at 21:44

## 2025-05-28 RX ADMIN — OXYBUTYNIN CHLORIDE 5 MILLIGRAM(S): 5 TABLET, FILM COATED, EXTENDED RELEASE ORAL at 13:22

## 2025-05-28 RX ADMIN — KETOROLAC TROMETHAMINE 30 MILLIGRAM(S): 30 INJECTION, SOLUTION INTRAMUSCULAR; INTRAVENOUS at 18:42

## 2025-05-28 RX ADMIN — SODIUM CHLORIDE 125 MILLILITER(S): 9 INJECTION, SOLUTION INTRAVENOUS at 14:22

## 2025-05-28 RX ADMIN — KETOROLAC TROMETHAMINE 30 MILLIGRAM(S): 30 INJECTION, SOLUTION INTRAMUSCULAR; INTRAVENOUS at 20:00

## 2025-05-28 RX ADMIN — Medication 4 MILLIGRAM(S): at 00:56

## 2025-05-28 RX ADMIN — KETOROLAC TROMETHAMINE 30 MILLIGRAM(S): 30 INJECTION, SOLUTION INTRAMUSCULAR; INTRAVENOUS at 06:53

## 2025-05-28 RX ADMIN — Medication 1000 MILLILITER(S): at 00:56

## 2025-05-28 RX ADMIN — OXYCODONE HYDROCHLORIDE 5 MILLIGRAM(S): 30 TABLET ORAL at 13:36

## 2025-05-28 RX ADMIN — SODIUM CHLORIDE 125 MILLILITER(S): 9 INJECTION, SOLUTION INTRAVENOUS at 06:53

## 2025-05-28 RX ADMIN — Medication 4 MILLIGRAM(S): at 17:10

## 2025-05-28 RX ADMIN — Medication 200 MILLIGRAM(S): at 14:21

## 2025-05-28 RX ADMIN — Medication 50 MICROGRAM(S): at 13:35

## 2025-05-28 RX ADMIN — Medication 4 MILLIGRAM(S): at 05:53

## 2025-05-28 RX ADMIN — Medication 2 TABLET(S): at 22:52

## 2025-05-28 RX ADMIN — Medication 0.2 MILLIGRAM(S): at 08:41

## 2025-05-28 RX ADMIN — Medication 100 MILLILITER(S): at 16:11

## 2025-05-28 RX ADMIN — Medication 4 MILLIGRAM(S): at 01:42

## 2025-05-28 RX ADMIN — Medication 2 TABLET(S): at 21:43

## 2025-05-28 RX ADMIN — CEFTRIAXONE 2000 MILLIGRAM(S): 500 INJECTION, POWDER, FOR SOLUTION INTRAMUSCULAR; INTRAVENOUS at 04:09

## 2025-05-28 RX ADMIN — Medication 200 MILLIGRAM(S): at 02:41

## 2025-05-28 RX ADMIN — Medication 0.2 MILLIGRAM(S): at 13:25

## 2025-05-28 RX ADMIN — Medication 0.5 MILLIGRAM(S): at 16:27

## 2025-05-28 RX ADMIN — Medication 0.5 MILLIGRAM(S): at 21:07

## 2025-05-28 RX ADMIN — Medication 0.2 MILLIGRAM(S): at 08:13

## 2025-05-28 RX ADMIN — Medication 50 MICROGRAM(S): at 13:47

## 2025-05-28 NOTE — H&P ADULT - HISTORY OF PRESENT ILLNESS
34M with PMHx of ADHD, peptic ulcer disease, GERD, kidney stones, and prior cholecystectomy presents to ED with right lower abdominal pain that has been intermittent daily for the past 3 weeks. Patient reports worsening pain tonight, accompanied by vomiting. Also notes dysuria.  34M with PMHx of ADHD, peptic ulcer disease, GERD, kidney stones, and prior cholecystectomy presents to ED with right lower abdominal pain that has been intermittent daily for the past 3 weeks. Patient reports worsening pain tonight, accompanied by nausea, vomiting, chills, sweats and dysuria.

## 2025-05-28 NOTE — ED PROVIDER NOTE - PRO INTERPRETER NEED 2
RN returned call to patient. He states he feels an irregular pulse. He denies chest pain, palpitations, shortness of breath, dizziness or lightheadedness. He states he is not in any distress. He rode a bike this morning for a workout and tolerated it well. He feels he has been in and out of rhythm for a couple of weeks. He is not on any antiarrhythmics, he takes an Aspirin 81mg only daily. He will have EKG and fax to clinic for further review and recommendations.   English

## 2025-05-28 NOTE — ED ADULT NURSE NOTE - OBJECTIVE STATEMENT
35 y/o male presents to ED c/o R sided abdominal pain starting weeks ago. +n/v, chills. Denies fevers, diarrhea. Pt reports that pain has gotten worse and he cannot tolerate PO.

## 2025-05-28 NOTE — ED PROVIDER NOTE - OBJECTIVE STATEMENT
Pt. is a 35 yo M with PMhx of ADHD, peptic ulcer disease, GERD, kidney stones, hx of cholecystectomy presents with abdominal pain.  Pain is in right lower abdomen and has been on and off for about 3 wks daily.  States pain worsened tonight with vomiting. +dysuria.  Hx of kidney stones.

## 2025-05-28 NOTE — ED ADULT NURSE REASSESSMENT NOTE - NS ED NURSE REASSESS COMMENT FT1
received pt from overnight RN Negro. pt A&Ox4, well appearing, and ambulatory at baseline - pending admission. VSS as per chart. pt medicated for 6/10 pain. report given to peds RN

## 2025-05-28 NOTE — ED PROVIDER NOTE - CLINICAL SUMMARY MEDICAL DECISION MAKING FREE TEXT BOX
33 yo M with right sided abd pain and vomiting.  Hx of kidney stones.  +dysuria and vomiting    labs, urine, CT abd/pelv to r/o appendicitis vs kidney stone.    CT showing impacted obstructing ureterolithiasis. +UA, Treated with IVFs and IV antibiotics.     Urology Dr. Daniel consulted.  Surgery PA at bedside.  Keeping patient NPO, may need stent today.

## 2025-05-28 NOTE — PROGRESS NOTE ADULT - ASSESSMENT
34M with PMHx of ADHD, peptic ulcer disease, GERD, kidney stones, and prior cholecystectomy presents to ED with right lower abdominal pain that has been intermittent daily for the past 3 weeks. Patient reports worsening pain tonight, accompanied by nausea, vomiting, chills, sweats and dysuria.      34M admitted for complicated nephrolithiasis    #Complicated nephrolithiasis  # Right Hydronephrosis  #UTI  -C/w ceftriaxone.   -Flomax and IVFs  -Pain control  -Urology consult. s/p right ureteral stent  f/u cx and sx    #Acute anemia  -2/2 to the above?  -UA with large blood  -Monitor Hgb.     # Hypokalemia; replace, recheck    # Hypophosphatemia: replace, recheck    #DVT ppx: SCDs

## 2025-05-28 NOTE — ED ADULT NURSE REASSESSMENT NOTE - NS ED NURSE REASSESS COMMENT FT1
Received report from MARCI Scott. Pt resting comfortably in stretcher, breathing equally and unlabored. POC explained, stretcher in lowest position, pt in view of the nurses station.

## 2025-05-28 NOTE — PATIENT PROFILE ADULT - NUMBER OF YRS
Trouble maintaining sleep. Chest pain Pt complaining of moderate to severe pain radiating across right side of face 15

## 2025-05-28 NOTE — H&P ADULT - NSHPLABSRESULTS_GEN_ALL_CORE
LABS:  cret                        9.9    11.73 )-----------( 537      ( 28 May 2025 00:53 )             31.7     05-28    140  |  108  |  11  ----------------------------<  118[H]  4.3   |  28  |  1.26    Ca    9.2      28 May 2025 00:53    TPro  7.9  /  Alb  3.7  /  TBili  0.2  /  DBili  x   /  AST  25  /  ALT  37  /  AlkPhos  118  05-28    Urinalysis with Rflx Culture (collected 05-28-25 @ 01:31)    < from: CT Abdomen and Pelvis w/ IV Cont (05.28.25 @ 01:17) >      IMPRESSION:  Obstructive proximal right ureterolithiasis. Read above text for   additional findings, and detailed explanations.        --- End of Report ---    < end of copied text >

## 2025-05-28 NOTE — CONSULT NOTE ADULT - SUBJECTIVE AND OBJECTIVE BOX
HPI:  34M with PMHx of ADHD, peptic ulcer disease, GERD, kidney stones, and prior cholecystectomy presents to ED with right lower abdominal pain that has been intermittent daily for the past 3 weeks. Patient reports worsening pain tonight, accompanied by vomiting. c/o dysuria, urinary frequency. c/o chills.       PAST MEDICAL & SURGICAL HISTORY:  H/O esophageal ulcer      Attention deficit hyperkinetic disorder      Anxiety      ADHD      Stomach ulcer      Esophagitis      Insomnia      Hematuria  microscopic      COVID-19 vaccine series completed  [employer]. 2nd dose "May or June, 2021"      Adult acne      GERD (gastroesophageal reflux disease)      S/P laparoscopic cholecystectomy  1/2021      History of endoscopy  upper      S/P cystoscopy      History of biopsy of bladder          REVIEW OF SYSTEMS    see above    MEDICATIONS  (STANDING):  cefTRIAXone Injectable. 2000 milliGRAM(s) IV Push every 24 hours  HYDROmorphone  Injectable 1 milliGRAM(s) IV Push Once  lactated ringers. 1000 milliLiter(s) (125 mL/Hr) IV Continuous <Continuous>  naloxone Injectable 0.4 milliGRAM(s) IV Push once  polyethylene glycol 3350 17 Gram(s) Oral daily  senna 2 Tablet(s) Oral at bedtime  sodium chloride 0.9% Bolus 2000 milliLiter(s) IV Bolus once  sodium chloride 0.9%. 1000 milliLiter(s) (125 mL/Hr) IV Continuous <Continuous>  tamsulosin 0.8 milliGRAM(s) Oral at bedtime    MEDICATIONS  (PRN):  acetaminophen     Tablet .. 650 milliGRAM(s) Oral every 6 hours PRN Mild Pain (1 - 3)  aluminum hydroxide/magnesium hydroxide/simethicone Suspension 30 milliLiter(s) Oral every 4 hours PRN Dyspepsia  bisacodyl 5 milliGRAM(s) Oral daily PRN Constipation  HYDROmorphone  Injectable 0.2 milliGRAM(s) IV Push every 4 hours PRN Moderate Pain (4 - 6)  HYDROmorphone  Injectable 0.5 milliGRAM(s) IV Push every 4 hours PRN Severe Pain (7 - 10)  melatonin 3 milliGRAM(s) Oral at bedtime PRN Insomnia  morphine  - Injectable 4 milliGRAM(s) IV Push every 4 hours PRN Severe Pain (7 - 10)  ondansetron Injectable 4 milliGRAM(s) IV Push every 8 hours PRN Nausea and/or Vomiting  ondansetron Injectable 4 milliGRAM(s) IV Push every 6 hours PRN Nausea and/or Vomiting      Allergies    Prozac (Vomiting)  Red wine allergy (Vomiting)    Intolerances        SOCIAL HISTORY:    FAMILY HISTORY:  Family history of renal cancer (Father)  with metastasis    Family history of diabetes mellitus (DM) (Grandparent)        Vital Signs Last 24 Hrs  T(C): 36.9 (28 May 2025 01:54), Max: 36.9 (27 May 2025 22:28)  T(F): 98.4 (28 May 2025 01:54), Max: 98.4 (27 May 2025 22:28)  HR: 108 (28 May 2025 01:54) (108 - 118)  BP: 153/100 (28 May 2025 01:54) (153/100 - 164/98)  BP(mean): 113 (27 May 2025 22:28) (113 - 113)  RR: 17 (28 May 2025 01:54) (17 - 18)  SpO2: 97% (28 May 2025 01:54) (97% - 98%)    Parameters below as of 28 May 2025 01:54  Patient On (Oxygen Delivery Method): room air        PHYSICAL EXAM:  GENERAL: No acute distress, lying comfortably in bed  HEAD:  Atraumatic, Normocephalic  CHEST/LUNG: Non labored respirations, no accessory muscle use.   HEART: Regular rate and rhythm; No murmurs, rubs, or gallops  ABDOMEN: Soft, non-tender, non-distended; no palpable bladder, no suprapubic tenderness/ + Suprapubic Tube draining clear yellow urine.  BACK: +CVAT R  EXT: calves non-tender b/l, no edema  NEUROLOGY: A&O x 3, no focal deficits        LABS:                        9.9    11.73 )-----------( 537      ( 28 May 2025 00:53 )             31.7     05-28    140  |  108  |  11  ----------------------------<  118[H]  4.3   |  28  |  1.26    Ca    9.2      28 May 2025 00:53    TPro  7.9  /  Alb  3.7  /  TBili  0.2  /  DBili  x   /  AST  25  /  ALT  37  /  AlkPhos  118  05-28      Urinalysis Basic - ( 28 May 2025 01:31 )    Color: Yellow / Appearance: Clear / SG: >1.030 / pH: x  Gluc: x / Ketone: x  / Bili: Negative / Urobili: 1.0 mg/dL   Blood: x / Protein: 100 mg/dL / Nitrite: Negative   Leuk Esterase: Small / RBC: 412 /HPF / WBC 80 /HPF   Sq Epi: x / Non Sq Epi: 1 /HPF / Bacteria: Negative /HPF        RADIOLOGY & ADDITIONAL STUDIES:    < from: CT Abdomen and Pelvis w/ IV Cont (05.28.25 @ 01:17) >  FINDINGS:  LOWER CHEST: Within normal limits.    LIVER: Within normal limits.  BILE DUCTS: Mild intrahepatic and extra hepatic biliary duct dilatation;   system with reservoir effect after cholecystectomy.  GALLBLADDER: Cholecystectomy.  SPLEEN: Within normal limits.  PANCREAS: Within normal limits.  ADRENALS: Withinnormal limits.  KIDNEYS/URETERS: Moderate right hydronephrosis secondary to a   conglomerate of impacted calculi within the proximal right ureter, these   measuring up to 1 cm in greatest dimension. Additional tiny punctate   calculus is seen within the proximal right ureter. Delayed right   nephrogram. Mild thickening and hyperenhancement of the urothelial lining   in the right kidney and proximal right ureter; likely ascending   infection. Normal left kidney.    BLADDER: Abnormal appearance of the urinary bladder which appears   contracted, thick walled, with multiple discontinuous wall   calcifications, and mild inflammatory stranding of the soft tissues   surrounding the urinary bladder.  REPRODUCTIVE ORGANS: Prostate within normal limits.    BOWEL: No bowel obstruction. Appendix is normal. Colonic diverticulosis   without acute diverticulitis.  PERITONEUM/RETROPERITONEUM: Within normal limits.  VESSELS: Within normal limits.  LYMPH NODES: No lymphadenopathy.  ABDOMINAL WALL: Within normal limits.  BONES: Degenerative changes.    IMPRESSION:  Obstructive proximal right ureterolithiasis. Read above text for   additional findings, and detailed explanations.      < end of copied text >

## 2025-05-28 NOTE — H&P ADULT - ASSESSMENT
34M admitted for complicated nephrolithiasis    #Complicated nephrolithiasis  -C/w ceftriaxone.   -Flomax and IVFs  -Pain control  -Urology consult.     #Acute anemia  -2/2 to the above?  -UA with large blood  -Monitor Hgb.     #DVT ppx: SCDs 34M admitted for complicated nephrolithiasis    #Complicated nephrolithiasis  #UTI  -C/w ceftriaxone.   -Flomax and IVFs  -Pain control  -Urology consult.     #Acute anemia  -2/2 to the above?  -UA with large blood  -Monitor Hgb.     #DVT ppx: SCDs

## 2025-05-28 NOTE — ED PROVIDER NOTE - CONSTITUTIONAL, MLM
Well appearing, awake, alert, oriented to person, place, time/situation; lying on side in fetal position, moaning, normal...

## 2025-05-28 NOTE — CONSULT NOTE ADULT - ASSESSMENT
35 y/o Male p/w r flank pain, vomiting, chills. WBC 11, Cr 1.28. CT shows 1 cm stone at Right proximal ureter, mod hydro, thickening of urothelial lining in R kidney and proximal r ureter concerning for infection     - NPO/IVF  - will f/u Ucx, started Rocephin  - pain control prn   - OR planning pending attending's rec  - Admit to Medicine     will d/w Dr. Bradford  Urology   Dominique Arcos PA-C

## 2025-05-28 NOTE — H&P ADULT - NSHPPHYSICALEXAM_GEN_ALL_CORE
T(C): 36.9 (05-28-25 @ 01:54), Max: 36.9 (05-27-25 @ 22:28)  HR: 108 (05-28-25 @ 01:54) (108 - 118)  BP: 153/100 (05-28-25 @ 01:54) (153/100 - 164/98)  RR: 17 (05-28-25 @ 01:54) (17 - 18)  SpO2: 97% (05-28-25 @ 01:54) (97% - 98%)    General: non-toxic  HEENT: non-traumatic, perrla, eomi  Cardio: s1s2 regular rate and rhythm  Lungs: comfortable breathing, clear to auscultation  Abdomen: Soft, non-tender, non-distended  Neuro: AOx4  Ext: Pulses +2

## 2025-05-29 ENCOUNTER — TRANSCRIPTION ENCOUNTER (OUTPATIENT)
Age: 35
End: 2025-05-29

## 2025-05-29 VITALS
DIASTOLIC BLOOD PRESSURE: 96 MMHG | SYSTOLIC BLOOD PRESSURE: 146 MMHG | OXYGEN SATURATION: 100 % | HEART RATE: 86 BPM | RESPIRATION RATE: 18 BRPM

## 2025-05-29 LAB
ALBUMIN SERPL ELPH-MCNC: 2.9 G/DL — LOW (ref 3.3–5)
ANION GAP SERPL CALC-SCNC: 6 MMOL/L — SIGNIFICANT CHANGE UP (ref 5–17)
BUN SERPL-MCNC: 4 MG/DL — LOW (ref 7–23)
CALCIUM SERPL-MCNC: 8.7 MG/DL — SIGNIFICANT CHANGE UP (ref 8.5–10.1)
CHLORIDE SERPL-SCNC: 107 MMOL/L — SIGNIFICANT CHANGE UP (ref 96–108)
CO2 SERPL-SCNC: 24 MMOL/L — SIGNIFICANT CHANGE UP (ref 22–31)
CREAT SERPL-MCNC: 0.74 MG/DL — SIGNIFICANT CHANGE UP (ref 0.5–1.3)
CULTURE RESULTS: SIGNIFICANT CHANGE UP
EGFR: 123 ML/MIN/1.73M2 — SIGNIFICANT CHANGE UP
EGFR: 123 ML/MIN/1.73M2 — SIGNIFICANT CHANGE UP
GLUCOSE SERPL-MCNC: 96 MG/DL — SIGNIFICANT CHANGE UP (ref 70–99)
HCT VFR BLD CALC: 25 % — LOW (ref 39–50)
HGB BLD-MCNC: 8.1 G/DL — LOW (ref 13–17)
MCHC RBC-ENTMCNC: 25.7 PG — LOW (ref 27–34)
MCHC RBC-ENTMCNC: 32.4 G/DL — SIGNIFICANT CHANGE UP (ref 32–36)
MCV RBC AUTO: 79.4 FL — LOW (ref 80–100)
NRBC # BLD AUTO: 0 K/UL — SIGNIFICANT CHANGE UP (ref 0–0)
NRBC # FLD: 0 K/UL — SIGNIFICANT CHANGE UP (ref 0–0)
NRBC BLD AUTO-RTO: 0 /100 WBCS — SIGNIFICANT CHANGE UP (ref 0–0)
PHOSPHATE SERPL-MCNC: 3.5 MG/DL — SIGNIFICANT CHANGE UP (ref 2.5–4.5)
PLATELET # BLD AUTO: 350 K/UL — SIGNIFICANT CHANGE UP (ref 150–400)
PMV BLD: 9.9 FL — SIGNIFICANT CHANGE UP (ref 7–13)
POTASSIUM SERPL-MCNC: 3.3 MMOL/L — LOW (ref 3.5–5.3)
POTASSIUM SERPL-SCNC: 3.3 MMOL/L — LOW (ref 3.5–5.3)
RBC # BLD: 3.15 M/UL — LOW (ref 4.2–5.8)
RBC # FLD: 13.2 % — SIGNIFICANT CHANGE UP (ref 10.3–14.5)
SODIUM SERPL-SCNC: 137 MMOL/L — SIGNIFICANT CHANGE UP (ref 135–145)
SPECIMEN SOURCE: SIGNIFICANT CHANGE UP
WBC # BLD: 6.88 K/UL — SIGNIFICANT CHANGE UP (ref 3.8–10.5)
WBC # FLD AUTO: 6.88 K/UL — SIGNIFICANT CHANGE UP (ref 3.8–10.5)

## 2025-05-29 PROCEDURE — 99239 HOSP IP/OBS DSCHRG MGMT >30: CPT

## 2025-05-29 RX ORDER — CEFUROXIME SODIUM 1.5 G
1 VIAL (EA) INJECTION
Qty: 10 | Refills: 0 | DISCHARGE
Start: 2025-05-29 | End: 2025-06-02

## 2025-05-29 RX ORDER — PHENAZOPYRIDINE HCL 100 MG
1 TABLET ORAL
Qty: 9 | Refills: 0
Start: 2025-05-29 | End: 2025-05-31

## 2025-05-29 RX ORDER — KETOROLAC TROMETHAMINE 30 MG/ML
30 INJECTION, SOLUTION INTRAMUSCULAR; INTRAVENOUS ONCE
Refills: 0 | Status: DISCONTINUED | OUTPATIENT
Start: 2025-05-29 | End: 2025-05-29

## 2025-05-29 RX ORDER — CEFUROXIME SODIUM 1.5 G
1 VIAL (EA) INJECTION
Qty: 10 | Refills: 0
Start: 2025-05-29 | End: 2025-06-02

## 2025-05-29 RX ORDER — OXYCODONE HYDROCHLORIDE 30 MG/1
1 TABLET ORAL
Qty: 12 | Refills: 0
Start: 2025-05-29 | End: 2025-05-31

## 2025-05-29 RX ORDER — KETOROLAC TROMETHAMINE 30 MG/ML
1 INJECTION, SOLUTION INTRAMUSCULAR; INTRAVENOUS
Qty: 9 | Refills: 0
Start: 2025-05-29 | End: 2025-05-31

## 2025-05-29 RX ORDER — TAMSULOSIN HYDROCHLORIDE 0.4 MG/1
2 CAPSULE ORAL
Qty: 60 | Refills: 0
Start: 2025-05-29 | End: 2025-06-27

## 2025-05-29 RX ORDER — OXYBUTYNIN CHLORIDE 5 MG/1
1 TABLET, FILM COATED, EXTENDED RELEASE ORAL
Qty: 21 | Refills: 0
Start: 2025-05-29 | End: 2025-06-04

## 2025-05-29 RX ADMIN — Medication 200 MILLIGRAM(S): at 08:45

## 2025-05-29 RX ADMIN — KETOROLAC TROMETHAMINE 30 MILLIGRAM(S): 30 INJECTION, SOLUTION INTRAMUSCULAR; INTRAVENOUS at 12:41

## 2025-05-29 RX ADMIN — OXYBUTYNIN CHLORIDE 5 MILLIGRAM(S): 5 TABLET, FILM COATED, EXTENDED RELEASE ORAL at 08:45

## 2025-05-29 RX ADMIN — Medication 100 MILLILITER(S): at 11:19

## 2025-05-29 RX ADMIN — OXYBUTYNIN CHLORIDE 5 MILLIGRAM(S): 5 TABLET, FILM COATED, EXTENDED RELEASE ORAL at 00:51

## 2025-05-29 RX ADMIN — Medication 200 MILLIGRAM(S): at 00:51

## 2025-05-29 RX ADMIN — CEFTRIAXONE 2000 MILLIGRAM(S): 500 INJECTION, POWDER, FOR SOLUTION INTRAMUSCULAR; INTRAVENOUS at 02:40

## 2025-05-29 RX ADMIN — Medication 0.5 MILLIGRAM(S): at 02:32

## 2025-05-29 RX ADMIN — Medication 0.5 MILLIGRAM(S): at 12:47

## 2025-05-29 RX ADMIN — Medication 100 MILLILITER(S): at 00:50

## 2025-05-29 RX ADMIN — Medication 0.5 MILLIGRAM(S): at 08:19

## 2025-05-29 RX ADMIN — Medication 0.5 MILLIGRAM(S): at 13:17

## 2025-05-29 RX ADMIN — KETOROLAC TROMETHAMINE 30 MILLIGRAM(S): 30 INJECTION, SOLUTION INTRAMUSCULAR; INTRAVENOUS at 12:11

## 2025-05-29 RX ADMIN — Medication 0.5 MILLIGRAM(S): at 07:49

## 2025-05-29 NOTE — DISCHARGE NOTE NURSING/CASE MANAGEMENT/SOCIAL WORK - PATIENT PORTAL LINK FT
You can access the FollowMyHealth Patient Portal offered by [facility] by registering at the following website: [email or web address]. By joining [facility]’s FollowMyHealth portal, you will also be able to view your health information using other applications (apps) compatible with our system.

## 2025-05-29 NOTE — PROGRESS NOTE ADULT - SUBJECTIVE AND OBJECTIVE BOX
Pt seen at bedside. Pt is POD 1 s/p cystoscopy, right ureteral stent placement. Pt is doing well post op. Notes some stent colic. No fevers, chills, flank pain.    PE  General: No distress, No anxiety  VITALS  T(C): 37.1 (05-29-25 @ 09:17), Max: 37.1 (05-29-25 @ 04:03)  HR: 86 (05-29-25 @ 09:57) (79 - 109)  BP: 146/96 (05-29-25 @ 09:57) (114/74 - 191/91)  RR: 18 (05-29-25 @ 09:57) (15 - 24)  SpO2: 100% (05-29-25 @ 09:57) (97% - 100%)               Lung    : No resp distress  Abdo:   : Soft, Non tender, No guarding, No distension   Back    : No CVAT b/l  Genitalia Male: No Collier  Neuro   : A&Ox3    LABS                        8.1    6.88  )-----------( 350      ( 29 May 2025 08:20 )             25.0   05-29    137  |  107  |  4[L]  ----------------------------<  96  3.3[L]   |  24  |  0.74    Ca    8.7      29 May 2025 08:20  Phos  3.5     05-29  Mg     1.8     05-28    TPro  x   /  Alb  2.9[L]  /  TBili  x   /  DBili  x   /  AST  x   /  ALT  x   /  AlkPhos  x   05-29  
: c/o right flank pain      PHYSICAL EXAM:    Daily Height in cm: 175 (28 May 2025 08:57)    Daily Weight in k (28 May 2025 08:57)    Vital Signs Last 24 Hrs  T(C): 36.8 (28 May 2025 16:00), Max: 37.3 (28 May 2025 03:30)  T(F): 98.3 (28 May 2025 16:00), Max: 99.1 (28 May 2025 03:30)  HR: 91 (28 May 2025 16:00) (80 - 118)  BP: 136/89 (28 May 2025 16:00) (114/74 - 164/98)  BP(mean): 113 (27 May 2025 22:28) (113 - 113)  RR: 19 (28 May 2025 16:00) (15 - 24)  SpO2: 100% (28 May 2025 16:00) (97% - 100%)    Constitutional: Weak and ill appearing  HEENT: Atraumatic, TAZ,   Respiratory: Breath Sounds normal, no rhonchi/wheeze  Cardiovascular: N S1S2;   Gastrointestinal: Abdomen soft, non tender, Bowel Sounds present  Extremities: No edema, peripheral pulses present  Neurological: AAO x 3, no gross focal motor deficits  Skin: Non cellulitic, no rash, ulcers  Lymph Nodes: No lymphadenopathy noted  Back: No CVA tenderness   Musculoskeletal: non tender  Breasts: Deferred  Genitourinary: deferred  Rectal: Deferred    All Labs/EKG/Radiology/Meds reviewed by me                          8.3    9.36  )-----------( 376      ( 28 May 2025 08:18 )             26.5       CBC Full  -  ( 28 May 2025 08:18 )  WBC Count : 9.36 K/uL  RBC Count : 3.30 M/uL  Hemoglobin : 8.3 g/dL  Hematocrit : 26.5 %  Platelet Count - Automated : 376 K/uL  Mean Cell Volume : 80.3 fl  Mean Cell Hemoglobin : 25.2 pg  Mean Cell Hemoglobin Concentration : 31.3 g/dL  Auto Neutrophil # : x  Auto Lymphocyte # : x  Auto Monocyte # : x  Auto Eosinophil # : x  Auto Basophil # : x  Auto Neutrophil % : x  Auto Lymphocyte % : x  Auto Monocyte % : x  Auto Eosinophil % : x  Auto Basophil % : x      05-28    141  |  110[H]  |  9   ----------------------------<  93  3.4[L]   |  25  |  1.02    Ca    8.1[L]      28 May 2025 08:18  Phos  2.3       Mg     1.8         TPro  6.4  /  Alb  3.1[L]  /  TBili  0.1[L]  /  DBili  x   /  AST  24  /  ALT  32  /  AlkPhos  95        LIVER FUNCTIONS - ( 28 May 2025 08:18 )  Alb: 3.1 g/dL / Pro: 6.4 gm/dL / ALK PHOS: 95 U/L / ALT: 32 U/L / AST: 24 U/L / GGT: x                       Urinalysis Basic - ( 28 May 2025 08:18 )    Color: x / Appearance: x / SG: x / pH: x  Gluc: 93 mg/dL / Ketone: x  / Bili: x / Urobili: x   Blood: x / Protein: x / Nitrite: x   Leuk Esterase: x / RBC: x / WBC x   Sq Epi: x / Non Sq Epi: x / Bacteria: x      < from: CT Abdomen and Pelvis w/ IV Cont (25 @ 01:17) >  KIDNEYS/URETERS: Moderate right hydronephrosis secondary to a   conglomerate of impacted calculi within the proximal right ureter, these   measuring up to 1 cm in greatest dimension. Additional tiny punctate   calculus is seen within the proximal right ureter. Delayed right   nephrogram. Mild thickening and hyperenhancement of the urothelial lining   in the right kidney and proximal right ureter; likely ascending   infection. Normal left kidney.    BLADDER: Abnormal appearance of the urinary bladder which appears   contracted, thick walled, with multiple discontinuous wall   calcifications, and mild inflammatory stranding of the soft tissues   surrounding the urinary bladder.    < end of copied text >        MEDICATIONS  (STANDING):  cefTRIAXone Injectable. 2000 milliGRAM(s) IV Push every 24 hours  lactated ringers. 1000 milliLiter(s) (125 mL/Hr) IV Continuous <Continuous>  naloxone Injectable 0.4 milliGRAM(s) IV Push once  polyethylene glycol 3350 17 Gram(s) Oral daily  potassium phosphate / sodium phosphate Tablet (K-PHOS No. 2) 2 Tablet(s) Oral every 4 hours  senna 2 Tablet(s) Oral at bedtime  sodium chloride 0.9%. 1000 milliLiter(s) (100 mL/Hr) IV Continuous <Continuous>  tamsulosin 0.8 milliGRAM(s) Oral at bedtime    MEDICATIONS  (PRN):  acetaminophen     Tablet .. 650 milliGRAM(s) Oral every 6 hours PRN Mild Pain (1 - 3)  aluminum hydroxide/magnesium hydroxide/simethicone Suspension 30 milliLiter(s) Oral every 4 hours PRN Dyspepsia  bisacodyl 5 milliGRAM(s) Oral daily PRN Constipation  HYDROmorphone  Injectable 0.2 milliGRAM(s) IV Push every 4 hours PRN Moderate Pain (4 - 6)  HYDROmorphone  Injectable 0.5 milliGRAM(s) IV Push every 4 hours PRN Severe Pain (7 - 10)  melatonin 3 milliGRAM(s) Oral at bedtime PRN Insomnia  morphine  - Injectable 4 milliGRAM(s) IV Push every 4 hours PRN Severe Pain (7 - 10)  ondansetron Injectable 4 milliGRAM(s) IV Push every 8 hours PRN Nausea and/or Vomiting  oxybutynin 5 milliGRAM(s) Oral three times a day PRN urinary urgency  phenazopyridine 200 milliGRAM(s) Oral three times a day PRN dysuria

## 2025-05-29 NOTE — DISCHARGE NOTE NURSING/CASE MANAGEMENT/SOCIAL WORK - FINANCIAL ASSISTANCE
[facility] provides services at a reduced cost to those who are determined to be eligible through [facility]’s financial assistance program. Information regarding [facility]’s financial assistance program can be found by going to [email or web address] or by calling [phone].

## 2025-05-29 NOTE — DISCHARGE NOTE PROVIDER - HOSPITAL COURSE
PHYSICAL EXAM:    Daily     Daily     Vital Signs Last 24 Hrs  T(C): 37.1 (29 May 2025 09:17), Max: 37.1 (29 May 2025 04:03)  T(F): 98.7 (29 May 2025 09:17), Max: 98.7 (29 May 2025 04:03)  HR: 86 (29 May 2025 09:57) (79 - 109)  BP: 146/96 (29 May 2025 09:57) (114/74 - 191/91)  BP(mean): --  RR: 18 (29 May 2025 09:57) (15 - 24)  SpO2: 100% (29 May 2025 09:57) (97% - 100%)    Constitutional: Well  appearing  HEENT: Atraumatic, TAZ, Normal, No congestion  Respiratory: Breath Sounds normal, no rhonchi/wheeze  Cardiovascular: N S1S2;   Gastrointestinal: Abdomen soft, non tender, Bowel Sounds present  Extremities: No edema, peripheral pulses present  Neurological: AAO x 3, no gross focal motor deficits  Skin: Non cellulitic, no rash, ulcers  Lymph Nodes: No lymphadenopathy noted  Back: No CVA tenderness   Musculoskeletal: non tender  Breasts: Deferred  Genitourinary: deferred  Rectal: Deferred    34M with PMHx of ADHD, peptic ulcer disease, GERD, kidney stones, and prior cholecystectomy presents to ED with right lower abdominal pain that has been intermittent daily for the past 3 weeks. Patient reports worsening pain tonight, accompanied by nausea, vomiting, chills, sweats and dysuria.      34M admitted for complicated nephrolithiasis    #Complicated nephrolithiasis  # Right Hydronephrosis  #UTI  s/p  ceftriaxone.   -Flomax and IVFs given  -Pain control  -Urology consult. s/p right ureteral stent  f/u cx and sx ; neg  d/c home on Ceftin bid x 5 days  flomax, pyridium, oxybutynin prn  f/u with Dr. Pedersen in 1 week      #Acute anemia  -2/2 to the above?  -UA with large blood  -Monitor Hgb.   cbc in 2-3 days with PCP    # Hypokalemia; replace, recheck, replace again    # Hypophosphatemia: replace, recheck, normalized    d/c home    POC discussed with pt, team, Urology team    time spent 37 min

## 2025-05-29 NOTE — PROGRESS NOTE ADULT - ASSESSMENT
35 yo male with right obstructing ureteral stone. Pt is POD 1 s/p cystoscopy, right ureteral stent placement. Pt is doing well post op.   Recommend  - Discharge pt with Oxybutynin 5 mg Q8hrs PRN bladder spasms, Pyridium 200 mg Q8hrs PRN dysuria, pain meds PRN, antibiotics as per c/s and Flomax 0.4 mg QHS  - Strain urine  - Follow up with Dr. Pedersen  in 1-2 weeks for stent and stone management    Case discussed with Dr. Pedersen

## 2025-05-29 NOTE — DISCHARGE NOTE PROVIDER - PROVIDER TOKENS
PROVIDER:[TOKEN:[[id number]],FOLLOWUP:[1-3 days]],PROVIDER:[TOKEN:[[id number]],FOLLOWUP:[1 week]]

## 2025-05-29 NOTE — DISCHARGE NOTE PROVIDER - NSDCCPCAREPLAN_GEN_ALL_CORE_FT
PRINCIPAL DISCHARGE DIAGNOSIS  Diagnosis: Ureterolithiasis  Assessment and Plan of Treatment: s/p stent  f/u with Dr. Candelario in 1 week  take meds as prescribed

## 2025-05-29 NOTE — DISCHARGE NOTE PROVIDER - NSDCMRMEDTOKEN_GEN_ALL_CORE_FT
Adderall 20 mg oral tablet: 1 tab(s) orally once a day at 2pm as needed  Adderall 30 mg oral tablet: 1 tab(s) orally once a day as needed- pt only takes if going to work  cefuroxime 500 mg oral tablet: 1 tab(s) orally 2 times a day  Emergen-C oral tablet, chewable: 1 tab(s) chewed once a day  ketorolac 10 mg oral tablet: 1 tab(s) orally every 8 hours as needed for  moderate pain  Multiple Vitamins oral tablet: 1 tab(s) orally once a day  oxyBUTYnin 5 mg oral tablet: 1 tab(s) orally 3 times a day as needed for urinary urgency  phenazopyridine 200 mg oral tablet: 1 tab(s) orally 3 times a day as needed for dysuria  tamsulosin 0.4 mg oral capsule: 2 cap(s) orally once a day (at bedtime)  Xanax 1 mg oral tablet: 1 tab(s) orally once a day (at bedtime) ***pt can take up to 3 times daily if needed MDD:3***   Adderall 20 mg oral tablet: 1 tab(s) orally once a day at 2pm as needed  Adderall 30 mg oral tablet: 1 tab(s) orally once a day as needed- pt only takes if going to work  cefuroxime 500 mg oral tablet: 1 tab(s) orally 2 times a day  Emergen-C oral tablet, chewable: 1 tab(s) chewed once a day  ketorolac 10 mg oral tablet: 1 tab(s) orally every 8 hours as needed for  moderate pain  Multiple Vitamins oral tablet: 1 tab(s) orally once a day  oxyBUTYnin 5 mg oral tablet: 1 tab(s) orally 3 times a day as needed for urinary urgency  oxyCODONE 5 mg oral tablet: 1 tab(s) orally every 6 hours as needed for  severe pain MDD: 20 mg  phenazopyridine 200 mg oral tablet: 1 tab(s) orally 3 times a day as needed for dysuria  tamsulosin 0.4 mg oral capsule: 2 cap(s) orally once a day (at bedtime)  Xanax 1 mg oral tablet: 1 tab(s) orally once a day (at bedtime) ***pt can take up to 3 times daily if needed MDD:3***

## 2025-05-29 NOTE — DISCHARGE NOTE PROVIDER - CARE PROVIDER_API CALL
Isha Orosco  [facility] Medicine  [address] [place] [id number]  Phone: [phone]  Fax: [phone]  Follow Up Time: 1-3 days    Cornell Pedersen  Urology  [address] [place] [id number]  Phone: [phone]  Fax: [phone]  Follow Up Time: 1 week

## 2025-06-04 ENCOUNTER — INPATIENT (INPATIENT)
Facility: HOSPITAL | Age: 35
LOS: 3 days | Discharge: HOME | DRG: 698 | End: 2025-06-08
Attending: FAMILY MEDICINE | Admitting: STUDENT IN AN ORGANIZED HEALTH CARE EDUCATION/TRAINING PROGRAM
Payer: COMMERCIAL

## 2025-06-04 VITALS
SYSTOLIC BLOOD PRESSURE: 152 MMHG | OXYGEN SATURATION: 95 % | HEIGHT: 68.9 IN | WEIGHT: 194.89 LBS | DIASTOLIC BLOOD PRESSURE: 97 MMHG | HEART RATE: 127 BPM | RESPIRATION RATE: 20 BRPM | TEMPERATURE: 98 F

## 2025-06-04 DIAGNOSIS — Z98.890 OTHER SPECIFIED POSTPROCEDURAL STATES: Chronic | ICD-10-CM

## 2025-06-04 DIAGNOSIS — Z90.49 ACQUIRED ABSENCE OF OTHER SPECIFIED PARTS OF DIGESTIVE TRACT: Chronic | ICD-10-CM

## 2025-06-04 LAB
ALBUMIN SERPL ELPH-MCNC: 3.7 G/DL — SIGNIFICANT CHANGE UP (ref 3.3–5)
ALP SERPL-CCNC: 98 U/L — SIGNIFICANT CHANGE UP (ref 40–120)
ALT FLD-CCNC: 46 U/L — SIGNIFICANT CHANGE UP (ref 12–78)
ANION GAP SERPL CALC-SCNC: 4 MMOL/L — LOW (ref 5–17)
APPEARANCE UR: ABNORMAL
AST SERPL-CCNC: 25 U/L — SIGNIFICANT CHANGE UP (ref 15–37)
BACTERIA # UR AUTO: NEGATIVE /HPF — SIGNIFICANT CHANGE UP
BASOPHILS # BLD AUTO: 0.04 K/UL — SIGNIFICANT CHANGE UP (ref 0–0.2)
BASOPHILS NFR BLD AUTO: 0.4 % — SIGNIFICANT CHANGE UP (ref 0–2)
BILIRUB SERPL-MCNC: 0.2 MG/DL — SIGNIFICANT CHANGE UP (ref 0.2–1.2)
BILIRUB UR-MCNC: NEGATIVE — SIGNIFICANT CHANGE UP
BUN SERPL-MCNC: 19 MG/DL — SIGNIFICANT CHANGE UP (ref 7–23)
CALCIUM SERPL-MCNC: 9 MG/DL — SIGNIFICANT CHANGE UP (ref 8.5–10.1)
CAST: PRESENT /LPF — SIGNIFICANT CHANGE UP (ref 0–4)
CHLORIDE SERPL-SCNC: 109 MMOL/L — HIGH (ref 96–108)
CO2 SERPL-SCNC: 24 MMOL/L — SIGNIFICANT CHANGE UP (ref 22–31)
COLOR SPEC: YELLOW — SIGNIFICANT CHANGE UP
CREAT SERPL-MCNC: 1.36 MG/DL — HIGH (ref 0.5–1.3)
DIFF PNL FLD: ABNORMAL
EGFR: 70 ML/MIN/1.73M2 — SIGNIFICANT CHANGE UP
EGFR: 70 ML/MIN/1.73M2 — SIGNIFICANT CHANGE UP
EOSINOPHIL # BLD AUTO: 0.21 K/UL — SIGNIFICANT CHANGE UP (ref 0–0.5)
EOSINOPHIL NFR BLD AUTO: 1.9 % — SIGNIFICANT CHANGE UP (ref 0–6)
GLUCOSE SERPL-MCNC: 126 MG/DL — HIGH (ref 70–99)
GLUCOSE UR QL: NEGATIVE MG/DL — SIGNIFICANT CHANGE UP
GRAN CASTS # UR COMP ASSIST: 2 — SIGNIFICANT CHANGE UP
HCT VFR BLD CALC: 29 % — LOW (ref 39–50)
HGB BLD-MCNC: 9.3 G/DL — LOW (ref 13–17)
IMM GRANULOCYTES # BLD AUTO: 0.03 K/UL — SIGNIFICANT CHANGE UP (ref 0–0.07)
IMM GRANULOCYTES NFR BLD AUTO: 0.3 % — SIGNIFICANT CHANGE UP (ref 0–0.9)
KETONES UR QL: ABNORMAL MG/DL
LEUKOCYTE ESTERASE UR-ACNC: ABNORMAL
LYMPHOCYTES # BLD AUTO: 1.79 K/UL — SIGNIFICANT CHANGE UP (ref 1–3.3)
LYMPHOCYTES NFR BLD AUTO: 16.1 % — SIGNIFICANT CHANGE UP (ref 13–44)
MCHC RBC-ENTMCNC: 25.1 PG — LOW (ref 27–34)
MCHC RBC-ENTMCNC: 32.1 G/DL — SIGNIFICANT CHANGE UP (ref 32–36)
MCV RBC AUTO: 78.2 FL — LOW (ref 80–100)
MONOCYTES # BLD AUTO: 0.71 K/UL — SIGNIFICANT CHANGE UP (ref 0–0.9)
MONOCYTES NFR BLD AUTO: 6.4 % — SIGNIFICANT CHANGE UP (ref 2–14)
NEUTROPHILS # BLD AUTO: 8.31 K/UL — HIGH (ref 1.8–7.4)
NEUTROPHILS NFR BLD AUTO: 74.9 % — SIGNIFICANT CHANGE UP (ref 43–77)
NITRITE UR-MCNC: NEGATIVE — SIGNIFICANT CHANGE UP
NRBC # BLD AUTO: 0 K/UL — SIGNIFICANT CHANGE UP (ref 0–0)
NRBC # FLD: 0 K/UL — SIGNIFICANT CHANGE UP (ref 0–0)
NRBC BLD AUTO-RTO: 0 /100 WBCS — SIGNIFICANT CHANGE UP (ref 0–0)
PH UR: 6 — SIGNIFICANT CHANGE UP (ref 5–8)
PLATELET # BLD AUTO: 534 K/UL — HIGH (ref 150–400)
PMV BLD: 9.7 FL — SIGNIFICANT CHANGE UP (ref 7–13)
POTASSIUM SERPL-MCNC: 3.7 MMOL/L — SIGNIFICANT CHANGE UP (ref 3.5–5.3)
POTASSIUM SERPL-SCNC: 3.7 MMOL/L — SIGNIFICANT CHANGE UP (ref 3.5–5.3)
PROT SERPL-MCNC: 7.9 GM/DL — SIGNIFICANT CHANGE UP (ref 6–8.3)
PROT UR-MCNC: 300 MG/DL
RBC # BLD: 3.71 M/UL — LOW (ref 4.2–5.8)
RBC # FLD: 13.3 % — SIGNIFICANT CHANGE UP (ref 10.3–14.5)
RBC CASTS # UR COMP ASSIST: 750 /HPF — HIGH (ref 0–4)
SODIUM SERPL-SCNC: 137 MMOL/L — SIGNIFICANT CHANGE UP (ref 135–145)
SP GR SPEC: 1.02 — SIGNIFICANT CHANGE UP (ref 1–1.03)
SQUAMOUS # UR AUTO: 1 /HPF — SIGNIFICANT CHANGE UP (ref 0–5)
UROBILINOGEN FLD QL: 0.2 MG/DL — SIGNIFICANT CHANGE UP (ref 0.2–1)
WBC # BLD: 11.09 K/UL — HIGH (ref 3.8–10.5)
WBC # FLD AUTO: 11.09 K/UL — HIGH (ref 3.8–10.5)
WBC UR QL: 104 /HPF — HIGH (ref 0–5)

## 2025-06-04 PROCEDURE — 99285 EMERGENCY DEPT VISIT HI MDM: CPT

## 2025-06-04 PROCEDURE — 74176 CT ABD & PELVIS W/O CONTRAST: CPT | Mod: 26

## 2025-06-04 RX ADMIN — Medication 4 MILLIGRAM(S): at 22:56

## 2025-06-04 NOTE — ED ADULT NURSE NOTE - NSFALLUNIVINTERV_ED_ALL_ED
Bed/Stretcher in lowest position, wheels locked, appropriate side rails in place/Call bell, personal items and telephone in reach/Instruct patient to call for assistance before getting out of bed/chair/stretcher/Non-slip footwear applied when patient is off stretcher/[place] to call system/Physically safe environment - no spills, clutter or unnecessary equipment/Purposeful proactive rounding/Room/bathroom lighting operational, light cord in reach

## 2025-06-04 NOTE — ED STATDOCS - ATTENDING APP SHARED VISIT CONTRIBUTION OF CARE
Consultation - - Cardiology  Leola Bullock 40 y o  female MRN: [id number]  Unit/Bed#: MICU 04 Encounter: [id number]      Inpatient consult to Cardiology  Consult performed by: Aldo Rodriguez MD  Consult ordered by: Cheyanne Mejía PA-C          History of Present Illness   Physician Requesting Consult: Gayle Jorge DO  Reason for Consult / Principal Problem: pre-op    Assessment/Plan   Assessment/Plan:   40y o  year old female with PMH of PAD s/p LLE stenting complicated by thrombosis and thrombectomy and later bypass in 8/2021, DM2, HTN, COPD, obesity, HLD, active ppd smoker, schizoaffective disorder, bipolar 1, PTSD, who presented for left leg angiogram which showed occluded bypass graft with no reasonable distal target for revascularization  Cardiology consulted for pre-operative assessment  #pre op assessment  Pre-operative assessment for Left leg amputation  Has severe PAD s/p stenting with thrombosis in 2021 and subsequent bypass which is also occluded  Vascular recommended amputation as there are no other intervention options  Besides her uncontrolled hypertension, her cardiac risk is currently optimized  Prior echo and stress test reviewed  The stress did show a small amount of ischemia however she is on medical therapy for CAD and does not have any symptoms  RCRI is class 1-2 points  6-10% 30 day MACE  Control hypertension as below  No other cardiac testing needed prior to surgery  #PAD  LLE stenting 7/3077 complicated by stent thrombosis and later bypass  Angiogram this admission shows occluded bypass  Vascular surgery recommending amputation  -currently on aspirin and atorvastatin 40mg  -rivaroxaban 2 5mg BID at home   -anticoagulation and antiplatelets per vascular team      #HTN  Patient is severely hypertensive  Possibly worsened by steroids  Home meds: lisinopril 20mg,  She was started on metoprolol today which should be stopped  -Give IV lasix 20mg x1  "  -increase lisinopril to 40mg daily  -start PO hydralazine 50mg TID  -stop metoprolol given initiation of beta blockers should be avoided prior to surgery  #COPD  #hypoxemic respiratory failure  Bronchospasm after extuabation requiring reintubation  on methylprednisolone 40mg daily  Breathing comfortably  #DM2  On insulin at home  a1c 9 8 in 2022  On insulin at home  #positive stress test  9/2021 had positive stress test (small amount of ischemia in the distribution of the left anterior descending coronary artery)  outpt cardiologist suspicious of artifact  Nonetheless she has no anginal symptoms and has been on aspirin and statin for PAD  #HLD  , HDL 46,  in 2020  Recheck lipids  Consider increasing statin vs  Adding zetia  #Daily smoker   1 ppd smoker  On nicotine patch  \"quitting has been so hard\"  She is motivated to quit  HPI: Kaz Mishra is a 40y o  year old female with PMH of PAD s/p LLE stenting complicated by thrombosis and thrombectomy and later bypass in 8/2021, DM2, HTN, COPD, obesity, HLD, active ppd smoker, schizoaffective disorder, bipolar 1, PTSD, who presented for left leg angiogram which showed occluded bypass graft with no reasonable distal target for revascularization  Hospital course complicated by bronchospasm after initial extubation from OR requiring reintubation  She has now been extubated and feeling well  Interviewed the patient with her wife, Lorin Nunez  Patient denies shortness of breath, chest pain, palpitations  For a while now exertion has been limited by leg pain and shortness of breath  Vascular Is recommending left leg amputation as there is no further possible interventions  EKG sinus tachycardia with out ischemic changes  9/10/21 TTE  LEFT VENTRICLE: Size was normal  Systolic function was normal  Ejection fraction was estimated to be 60 %  There were no regional wall motion abnormalities   Wall thickness was mildly " increased  There was mild concentric hypertrophy  DOPPLER: Left ventricular diastolic function parameters were normal   RIGHT VENTRICLE: The size was normal  Systolic function was normal  Wall thickness was normal   LEFT ATRIUM: Size was normal   RIGHT ATRIUM: Size was normal   MITRAL VALVE: Valve structure was normal  There was normal leaflet separation  DOPPLER: The transmitral velocity was within the normal range  There was no evidence for stenosis  There was trace regurgitation  AORTIC VALVE: The valve was trileaflet  Leaflets exhibited normal thickness and normal cuspal separation  DOPPLER: Transaortic velocity was within the normal range  There was no evidence for stenosis  There was no significant  regurgitation  TRICUSPID VALVE: The valve structure was normal  There was normal leaflet separation  DOPPLER: The transtricuspid velocity was within the normal range  There was no evidence for stenosis  There was trace regurgitation  Pulmonary artery  systolic pressure was within the normal range  Estimated peak PA pressure was 25 mmHg  PULMONIC VALVE: Leaflets exhibited normal thickness, no calcification, and normal cuspal separation  DOPPLER: The transpulmonic velocity was within the normal range  There was trace regurgitation  PERICARDIUM: There was no pericardial effusion  The pericardium was normal in appearance  AORTA: The root exhibited normal size  SYSTEMIC VEINS: IVC: The inferior vena cava was normal in size  Respirophasic changes were normal       9/9/21 Nuclear Stress Test  GATED SPECT:  The calculated left ventricular ejection fraction was 50 %  Left ventricular ejection fraction was within normal limits by visual estimate  There was no diagnostic evidence for left ventricular regional abnormality      SUMMARY:  -  Stress results: There was no chest pain during stress  -  ECG conclusions: The stress ECG was non-diagnostic   -  Perfusion imaging:  There was a small, moderately severe, reversible "myocardial perfusion defect of the basal to mid anterior wall  -  Gated SPECT: The calculated left ventricular ejection fraction was 50 %  Left ventricular ejection fraction was within normal limits by visual estimate  There was no diagnostic evidence for left ventricular regional abnormality      IMPRESSIONS: Abnormal study after pharmacologic vasodilation without reproduction of symptoms  There was a small amount of ischemia in the distribution of the left anterior descending coronary artery  Left ventricular systolic function was  normal     Historical Information   Past Medical History:   Diagnosis Date   • Asthma    • Atherosclerosis    • Bipolar 1 disorder ([place] [address] )    • COPD (chronic obstructive pulmonary disease) ([facility])    • Depression    • Diabetes mellitus ([place] [address] )    • Hypertension    • Psychiatric disorder     cutting history   • PTSD (post-traumatic stress disorder)    • Schizoaffective disorder ([place] [address] )    • Tendonitis      Past Surgical History:   Procedure Laterality Date   • BYPASS FEMORAL-POPLITEAL Left 2021    Procedure: Left Common Femoral Below Knee to Popliteal Bypass with Insitu GSV graft  Left Lower Extremity Angiogram;  Surgeon: Nicole Jaramillo MD;  Location: [facility];  Service: Vascular   •  SECTION     • EAR SURGERY     • IR LOWER EXTREMITY ANGIOGRAM  2021   • IR LOWER EXTREMITY ANGIOGRAM  2021   • TUBAL LIGATION       Social History     Substance and Sexual Activity   Alcohol Use Not Currently    Comment: not currently     Social History     Substance and Sexual Activity   Drug Use Not Currently   • Types: Cocaine, Marijuana, \"Crack\" cocaine    Comment: 1 years clean from crack cocaine since      Social History     Tobacco Use   Smoking Status Every Day   • Packs/day: 1 00   • Years: 20 00   • Pack years: 20 00   • Types: Cigarettes   Smokeless Tobacco Former   • Quit date: 2022     Family History: mother had heart disease       Meds/[facility] " Medications:   Current Facility-Administered Medications   Medication Dose Route Frequency   • acetaminophen (TYLENOL) tablet 650 mg  650 mg Oral Q4H PRN   • albuterol (PROVENTIL HFA,VENTOLIN HFA) inhaler 2 puff  2 puff Inhalation Q4H PRN   • aspirin chewable tablet 81 mg  81 mg Oral Daily   • atorvastatin (LIPITOR) tablet 40 mg  40 mg Oral Daily With Dinner   • chlorhexidine (PERIDEX) 0 12 % oral rinse 15 mL  15 mL Mouth/Throat Q12H [address]   • gabapentin (NEURONTIN) oral solution 300 mg  300 mg Oral BID   • heparin (porcine) 25,000 units in 0 45% NaCl 250 mL infusion (premix)  3-30 Units/kg/hr (Order-Specific) Intravenous Titrated   • heparin (porcine) injection 10,000 Units  10,000 Units Intravenous Once   • heparin (porcine) injection 10,000 Units  10,000 Units Intravenous Q6H PRN   • heparin (porcine) injection 5,000 Units  5,000 Units Intravenous Q6H PRN   • hydrALAZINE (APRESOLINE) injection 10 mg  10 mg Intravenous Q6H PRN   • HYDROmorphone (DILAUDID) injection 1 mg  1 mg Intravenous Q4H PRN   • insulin glargine (LANTUS) subcutaneous injection 20 Units 0 2 mL  20 Units Subcutaneous Q12H [address]   • insulin lispro (HumaLOG) 100 units/mL subcutaneous injection 2-12 Units  2-12 Units Subcutaneous TID AC   • insulin lispro (HumaLOG) 100 units/mL subcutaneous injection 2-12 Units  2-12 Units Subcutaneous HS   • ipratropium (ATROVENT) 0 02 % inhalation solution 0 5 mg  0 5 mg Nebulization Q6H   • labetalol (NORMODYNE) injection 20 mg  20 mg Intravenous Q4H PRN   • levalbuterol (XOPENEX) inhalation solution 1 25 mg  1 25 mg Nebulization Q6H   • lisinopril (ZESTRIL) tablet 20 mg  20 mg Oral Daily   • melatonin tablet 6 mg  6 mg Oral HS   • [START ON 3/28/2023] methylPREDNISolone sodium succinate (Solu-MEDROL) injection 40 mg  40 mg Intravenous Daily   • metoprolol tartrate (LOPRESSOR) tablet 25 mg  25 mg Oral Q12H ABDULKADIR   • nicotine (NICODERM CQ) 21 mg/24 hr TD 24 hr patch 21 mg  21 mg Transdermal Daily   • oxyCODONE "(ROXICODONE) immediate release tablet 10 mg  10 mg Oral Q4H PRN   • oxyCODONE (ROXICODONE) IR tablet 5 mg  5 mg Oral Q4H PRN   • QUEtiapine (SEROquel) tablet 200 mg  200 mg Oral HS     Home Medications:   Facility-Administered Medications Prior to Admission   Medication   • lidocaine (LMX) 4 % cream     Medications Prior to Admission   Medication   • Advair -21 MCG/ACT inhaler   • albuterol (PROVENTIL HFA,VENTOLIN HFA) 90 mcg/act inhaler   • dulaglutide (Trulicity) 1 5 FF/0 1MJ injection   • hydrOXYzine HCL (ATARAX) 50 mg tablet   • Insulin Glargine Solostar (Lantus SoloStar) 100 UNIT/ML SOPN   • lidocaine (XYLOCAINE) 5 % ointment   • lisinopril (ZESTRIL) 20 mg tablet   • metFORMIN (GLUCOPHAGE) 1000 MG tablet   • prazosin (MINIPRESS) 1 mg capsule   • QUEtiapine (SEROquel) 200 mg tablet   • rivaroxaban (Xarelto) 2 5 mg tablet   • topiramate (TOPAMAX) 25 mg tablet   • Alcohol Swabs (Pharmacist Choice Alcohol) PADS   • atorvastatin (LIPITOR) 40 mg tablet   • Blood Glucose Monitoring Suppl (True Metrix Air Glucose Meter) w/Device KIT   • Blood Pressure Monitoring (Blood Pressure Monitor Automat) KATLYN   • Diclofenac Sodium (VOLTAREN) 1 %   • Global Inject Ease Lancets 30G MISC   • insulin lispro (HumaLOG) 100 units/mL injection   • Insulin Pen Needle (Comfort EZ Pen Needles) 32G X 4 MM MISC   • Lancet Devices (Lancing Device) MISC   • nicotine (NICODERM CQ) 21 mg/24 hr TD 24 hr patch   • OneTouch Ultra test strip       No Known Allergies    Objective   Vitals: Blood pressure (!) 190/95, pulse (!) 106, temperature 98 °F (36 7 °C), temperature source Oral, resp  rate 21, height 5' 1\" (1 549 m), weight 130 kg (287 lb 4 2 oz), SpO2 99 %, not currently breastfeeding    Orthostatic Blood Pressures    Flowsheet Row Most Recent Value   Blood Pressure 190/95 filed at 03/27/2023 1100   Patient Position - Orthostatic VS Lying filed at 03/27/2023 0800            Intake/Output Summary (Last 24 hours) at 3/27/2023 1134  Last " data filed at 3/27/2023 1100  Gross per 24 hour   Intake 2929 77 ml   Output 2235 ml   Net 694 77 ml       Invasive Devices     Peripherally Inserted Central Catheter Line  Duration           PICC Line 85/41/47 Right Basilic 2 days          Peripheral Intravenous Line  Duration           Peripheral IV 03/24/23 Distal;Left;Ventral (anterior) Forearm 3 days    Peripheral IV 03/24/23 Left;Proximal;Ventral (anterior) Forearm 3 days                Review of Systems:  ROS  ROS as noted above, otherwise 12 point review of systems was performed and is negative  Physical Exam:   Physical Exam  Constitutional:       General: She is not in acute distress  HENT:      Head: Normocephalic  Eyes:      Extraocular Movements: Extraocular movements intact  Cardiovascular:      Rate and Rhythm: Regular rhythm  Tachycardia present  Heart sounds: No murmur heard  Pulmonary:      Effort: Pulmonary effort is normal  No respiratory distress  Abdominal:      General: There is no distension  Musculoskeletal:      Cervical back: Normal range of motion  Comments: Left leg is cool to touch with no appreciable pulses  Right leg is warm  No significant edema  Skin:     General: Skin is warm and dry  Coloration: Skin is not jaundiced  Neurological:      General: No focal deficit present  Mental Status: She is alert  Lab Results: I have personally reviewed pertinent lab results      Results from last 7 days   Lab Units 03/27/23  0429 03/26/23 0437 03/25/23  0419   WBC Thousand/uL 17 93* 17 28* 13 76*   HEMOGLOBIN g/dL 11 9 11 5 12 4   HEMATOCRIT % 33 5* 33 3* 35 0   PLATELETS Thousands/uL 273 278 311     Results from last 7 days   Lab Units 03/27/23  0429 03/26/23 0437 03/25/23  0419   POTASSIUM mmol/L 3 9 4 3 3 9   CHLORIDE mmol/L 108 111* 109*   CO2 mmol/L 25 24 21   BUN mg/dL 16 10 10   CREATININE mg/dL 0 58* 0 54* 0 58*   CALCIUM mg/dL 7 8* 7 8* 8 2*     Results from last 7 days   Lab Units 03/24/23  1543   INR  0 99   PTT seconds 21*  21*     Results from last 7 days   Lab Units 03/27/23  0429 03/26/23  0437 03/24/23  1338   MAGNESIUM mg/dL 2 4 2 5 1 9 I, Rose Marie Louis DO,  performed the initial face to face bedside interview with this patient regarding history of present illness, review of symptoms and relevant past medical, social and family history.  I completed an independent physical examination.  I was the initial provider who evaluated this patient.   I personally saw the patient and performed a substantive portion of the visit including all aspects of the medical decision making.  I have signed out the follow up of any pending tests (i.e. labs, radiological studies) to the ACP.  I have communicated the patient’s plan of care and disposition with the ACP.  The history, relevant review of systems, past medical and surgical history, medical decision making, and physical examination was documented by the scribe in my presence and I attest to the accuracy of the documentation.

## 2025-06-04 NOTE — ED STATDOCS - PROGRESS NOTE DETAILS
Patient seen and evaluated, ED attending note and orders reviewed, will continue with patient follow up and care -Stacey Jansen PA-C pt able to give urine sample, post void bladder scan 28mL will consult uro  Stacey Jansen PA-C labs with anemia which is improved, Cr 1.36, UA with moderate leuks, CT a/p with Interval placement of right double-J ureteral stent with mild residual right hydroureteronephrosis. 9 mm stone in the proximal right ureter   similar to prior. Similar appearance of the bladder with prominent wall thickening and surrounding inflammatory change with multiple wall calcifications.  Urology to see the patient  Stacey Jansen PA-C

## 2025-06-04 NOTE — ED STATDOCS - OBJECTIVE STATEMENT
34 year old male with PMHx of anxiety, kidney stones s/p ureter stent placement last week presents to ED c/o urinary retention x48 hours. patient states he has attempted to urinate however only a small amount comes out in dribbles. patient reports pain to the groin, denies testicular pain, hematuria.

## 2025-06-04 NOTE — ED STATDOCS - NSFOLLOWUPINSTRUCTIONS_ED_ALL_ED_FT
Ureteral Stent Implantation, Care After  The following information offers guidance on how to care for yourself after your procedure. Your health care provider may also give you more specific instructions. If you have problems or questions, contact your health care provider.    What can I expect after the procedure?  After the procedure, it is common to have:  Nausea.  Mild pain when you urinate. You may feel this pain in your lower back or lower abdomen. The pain should stop within a few minutes after you urinate. This pattern may last for up to 1 week.  A small amount of blood in your urine for several days.  Follow these instructions at home:  Medicines    Take over-the-counter and prescription medicines only as told by your health care provider.  If you were prescribed antibiotics, take them as told by your health care provider. Do not stop using the antibiotic even if you start to feel better.  If you were given a sedative during the procedure, it can affect you for several hours. Do not drive or operate machinery until your health care provider says that it is safe.  Ask your health care provider if the medicine prescribed to you:  Requires you to avoid driving or using machinery.  Can cause constipation. You may need to take these actions to prevent or treat constipation:  Take over-the-counter or prescription medicines.  Eat foods that are high in fiber, such as beans, whole grains, and fresh fruits and vegetables.  Limit foods that are high in fat and processed sugars, such as fried or sweet foods.  Activity    Rest as told by your health care provider.  Do not sit for a long time without moving. Get up to take short walks every 1–2 hours. This will improve blood flow and breathing. Ask for help if you feel weak or unsteady.  Return to your normal activities as told by your health care provider. Ask your health care provider what activities are safe for you.  General instructions    A comparison of three sample cups showing dark yellow, yellow, and pale yellow urine.  If you have a catheter:  Follow instructions from your health care provider about taking care of your catheter and collection bag.  Do not take baths, swim, or use a hot tub until your health care provider approves. Ask your health care provider if you may take showers. You may only be allowed to take sponge baths.  Drink enough fluid to keep your urine pale yellow.  Do not use any products that contain nicotine or tobacco. These products include cigarettes, chewing tobacco, and vaping devices, such as e-cigarettes. These can delay healing after surgery. If you need help quitting, ask your health care provider.  Keep all follow-up visits.  Contact a health care provider if:  You start passing blood clots, or you have more than a small amount of blood in your urine.  You have pain that gets worse or does not get better with medicine, especially pain when you urinate.  You have trouble urinating.  You feel nauseous or you vomit again and again during a period of more than 2 days after the procedure.  You have a fever.  Get help right away if:  You are passing blood clots that are 1 inch (2.5 cm) or larger in size.  You are leaking urine (have incontinence), or you cannot urinate.  The end of the stent comes out of your urethra.  You have sudden, sharp, or severe pain in your abdomen or lower back.  You have swelling or pain in your legs.  You have trouble breathing.  These symptoms may be an emergency. Get help right away. Call 911.  Do not wait to see if the symptoms will go away.  Do not drive yourself to the hospital.  Summary  After the procedure, it is common to have mild pain when you urinate that goes away within a few minutes after you urinate. This may last for up to 1 week.  Take over-the-counter and prescription medicines only as told by your health care provider.  Drink enough fluid to keep your urine pale yellow.  Call your health care provider if you start passing blood clots, or you have more than a small amount of blood in your urine.  This information is not intended to replace advice given to you by your health care provider. Make sure you discuss any questions you have with your health care provider.

## 2025-06-04 NOTE — ED ADULT NURSE NOTE - OBJECTIVE STATEMENT
Pt ambulatory c/o RLQ abdominal pain x 2 days. Denies n/v/d/fevers/hematuria. Pt recently had urethral stent placed for urethral stone. AO x 4. Ambulatory. Pt appears in pain. Pt refused catheter. Post void residual 29 mL.

## 2025-06-04 NOTE — ED ADULT TRIAGE NOTE - CHIEF COMPLAINT QUOTE
pt ambulatory to ED AO x 4 c/o R sided abdominal pain and urinary retention. pt had ureter stent placed last week for kidney stone. Denies hematuria.

## 2025-06-05 ENCOUNTER — APPOINTMENT (OUTPATIENT)
Dept: UROLOGY | Facility: CLINIC | Age: 35
End: 2025-06-05

## 2025-06-05 DIAGNOSIS — R10.9 UNSPECIFIED ABDOMINAL PAIN: ICD-10-CM

## 2025-06-05 LAB
ANION GAP SERPL CALC-SCNC: 3 MMOL/L — LOW (ref 5–17)
BUN SERPL-MCNC: 16 MG/DL — SIGNIFICANT CHANGE UP (ref 7–23)
CALCIUM SERPL-MCNC: 8.1 MG/DL — LOW (ref 8.5–10.1)
CHLORIDE SERPL-SCNC: 112 MMOL/L — HIGH (ref 96–108)
CO2 SERPL-SCNC: 25 MMOL/L — SIGNIFICANT CHANGE UP (ref 22–31)
CREAT SERPL-MCNC: 0.97 MG/DL — SIGNIFICANT CHANGE UP (ref 0.5–1.3)
EGFR: 106 ML/MIN/1.73M2 — SIGNIFICANT CHANGE UP
EGFR: 106 ML/MIN/1.73M2 — SIGNIFICANT CHANGE UP
FERRITIN SERPL-MCNC: 16 NG/ML — LOW (ref 30–400)
FERRITIN SERPL-MCNC: 17 NG/ML — LOW (ref 30–400)
FOLATE SERPL-MCNC: 3.3 NG/ML — LOW
GLUCOSE SERPL-MCNC: 114 MG/DL — HIGH (ref 70–99)
HCT VFR BLD CALC: 24.8 % — LOW (ref 39–50)
HGB BLD-MCNC: 7.7 G/DL — LOW (ref 13–17)
IMMATURE RETICULOCYTE FRACTION %: 12.1 % — SIGNIFICANT CHANGE UP
IRON SATN MFR SERPL: 14 UG/DL — LOW (ref 45–165)
IRON SATN MFR SERPL: 15 UG/DL — LOW (ref 45–165)
IRON SATN MFR SERPL: 5 % — LOW (ref 16–55)
IRON SATN MFR SERPL: 6 % — LOW (ref 16–55)
LACTATE SERPL-SCNC: 1.1 MMOL/L — SIGNIFICANT CHANGE UP (ref 0.7–2)
MCHC RBC-ENTMCNC: 24.8 PG — LOW (ref 27–34)
MCHC RBC-ENTMCNC: 31 G/DL — LOW (ref 32–36)
MCV RBC AUTO: 79.7 FL — LOW (ref 80–100)
NRBC # BLD AUTO: 0 K/UL — SIGNIFICANT CHANGE UP (ref 0–0)
NRBC # FLD: 0 K/UL — SIGNIFICANT CHANGE UP (ref 0–0)
NRBC BLD AUTO-RTO: 0 /100 WBCS — SIGNIFICANT CHANGE UP (ref 0–0)
PLATELET # BLD AUTO: 398 K/UL — SIGNIFICANT CHANGE UP (ref 150–400)
PMV BLD: 9.3 FL — SIGNIFICANT CHANGE UP (ref 7–13)
POTASSIUM SERPL-MCNC: 3.7 MMOL/L — SIGNIFICANT CHANGE UP (ref 3.5–5.3)
POTASSIUM SERPL-SCNC: 3.7 MMOL/L — SIGNIFICANT CHANGE UP (ref 3.5–5.3)
RBC # BLD: 3.07 M/UL — LOW (ref 4.2–5.8)
RBC # BLD: 3.11 M/UL — LOW (ref 4.2–5.8)
RBC # FLD: 13.2 % — SIGNIFICANT CHANGE UP (ref 10.3–14.5)
RETICS #: 41.1 K/UL — SIGNIFICANT CHANGE UP (ref 25–125)
RETICS/RBC NFR: 1.3 % — SIGNIFICANT CHANGE UP (ref 0.5–2.5)
RETICULOCYTE HEMOGLOBIN EQUIVALENT: 22.6 PG — LOW (ref 36–38.6)
SODIUM SERPL-SCNC: 140 MMOL/L — SIGNIFICANT CHANGE UP (ref 135–145)
TIBC SERPL-MCNC: 258 UG/DL — SIGNIFICANT CHANGE UP (ref 220–430)
TIBC SERPL-MCNC: 266 UG/DL — SIGNIFICANT CHANGE UP (ref 220–430)
UIBC SERPL-MCNC: 243 UG/DL — SIGNIFICANT CHANGE UP (ref 110–370)
UIBC SERPL-MCNC: 252 UG/DL — SIGNIFICANT CHANGE UP (ref 110–370)
VIT B12 SERPL-MCNC: 318 PG/ML — SIGNIFICANT CHANGE UP (ref 232–1245)
WBC # BLD: 7.44 K/UL — SIGNIFICANT CHANGE UP (ref 3.8–10.5)
WBC # FLD AUTO: 7.44 K/UL — SIGNIFICANT CHANGE UP (ref 3.8–10.5)

## 2025-06-05 PROCEDURE — 80076 HEPATIC FUNCTION PANEL: CPT

## 2025-06-05 PROCEDURE — 83540 ASSAY OF IRON: CPT

## 2025-06-05 PROCEDURE — 99222 1ST HOSP IP/OBS MODERATE 55: CPT

## 2025-06-05 PROCEDURE — 82306 VITAMIN D 25 HYDROXY: CPT

## 2025-06-05 PROCEDURE — 82365 CALCULUS SPECTROSCOPY: CPT

## 2025-06-05 PROCEDURE — 76000 FLUOROSCOPY <1 HR PHYS/QHP: CPT

## 2025-06-05 PROCEDURE — 86140 C-REACTIVE PROTEIN: CPT

## 2025-06-05 PROCEDURE — 84550 ASSAY OF BLOOD/URIC ACID: CPT

## 2025-06-05 PROCEDURE — C1889: CPT

## 2025-06-05 PROCEDURE — 85045 AUTOMATED RETICULOCYTE COUNT: CPT

## 2025-06-05 PROCEDURE — C2617: CPT

## 2025-06-05 PROCEDURE — 80048 BASIC METABOLIC PNL TOTAL CA: CPT

## 2025-06-05 PROCEDURE — 88300 SURGICAL PATH GROSS: CPT

## 2025-06-05 PROCEDURE — 82728 ASSAY OF FERRITIN: CPT

## 2025-06-05 PROCEDURE — 84145 PROCALCITONIN (PCT): CPT

## 2025-06-05 PROCEDURE — 36415 COLL VENOUS BLD VENIPUNCTURE: CPT

## 2025-06-05 PROCEDURE — 82607 VITAMIN B-12: CPT

## 2025-06-05 PROCEDURE — 83735 ASSAY OF MAGNESIUM: CPT

## 2025-06-05 PROCEDURE — 86682 HELMINTH ANTIBODY: CPT

## 2025-06-05 PROCEDURE — 99223 1ST HOSP IP/OBS HIGH 75: CPT

## 2025-06-05 PROCEDURE — 82550 ASSAY OF CK (CPK): CPT

## 2025-06-05 PROCEDURE — 85025 COMPLETE CBC W/AUTO DIFF WBC: CPT

## 2025-06-05 PROCEDURE — 84443 ASSAY THYROID STIM HORMONE: CPT

## 2025-06-05 PROCEDURE — 84100 ASSAY OF PHOSPHORUS: CPT

## 2025-06-05 PROCEDURE — C1894: CPT

## 2025-06-05 PROCEDURE — C1769: CPT

## 2025-06-05 PROCEDURE — 83550 IRON BINDING TEST: CPT

## 2025-06-05 PROCEDURE — 82746 ASSAY OF FOLIC ACID SERUM: CPT

## 2025-06-05 PROCEDURE — 85027 COMPLETE CBC AUTOMATED: CPT

## 2025-06-05 PROCEDURE — 80053 COMPREHEN METABOLIC PANEL: CPT

## 2025-06-05 RX ORDER — OXYCODONE HYDROCHLORIDE 30 MG/1
5 TABLET ORAL EVERY 4 HOURS
Refills: 0 | Status: DISCONTINUED | OUTPATIENT
Start: 2025-06-05 | End: 2025-06-07

## 2025-06-05 RX ORDER — MELATONIN 5 MG
3 TABLET ORAL AT BEDTIME
Refills: 0 | Status: DISCONTINUED | OUTPATIENT
Start: 2025-06-05 | End: 2025-06-08

## 2025-06-05 RX ORDER — CYANOCOBALAMIN 1000 UG/ML
500 INJECTION INTRAMUSCULAR; SUBCUTANEOUS DAILY
Refills: 0 | Status: DISCONTINUED | OUTPATIENT
Start: 2025-06-05 | End: 2025-06-08

## 2025-06-05 RX ORDER — IRON SUCROSE 20 MG/ML
100 INJECTION, SOLUTION INTRAVENOUS EVERY 24 HOURS
Refills: 0 | Status: DISCONTINUED | OUTPATIENT
Start: 2025-06-05 | End: 2025-06-05

## 2025-06-05 RX ORDER — HYDROMORPHONE/SOD CHLOR,ISO/PF 2 MG/10 ML
0.5 SYRINGE (ML) INJECTION EVERY 4 HOURS
Refills: 0 | Status: DISCONTINUED | OUTPATIENT
Start: 2025-06-05 | End: 2025-06-07

## 2025-06-05 RX ORDER — ONDANSETRON HCL/PF 4 MG/2 ML
4 VIAL (ML) INJECTION EVERY 8 HOURS
Refills: 0 | Status: DISCONTINUED | OUTPATIENT
Start: 2025-06-05 | End: 2025-06-08

## 2025-06-05 RX ORDER — MAGNESIUM, ALUMINUM HYDROXIDE 200-200 MG
30 TABLET,CHEWABLE ORAL EVERY 4 HOURS
Refills: 0 | Status: DISCONTINUED | OUTPATIENT
Start: 2025-06-05 | End: 2025-06-08

## 2025-06-05 RX ORDER — B1/B2/B3/B5/B6/B12/VIT C/FOLIC 500-0.5 MG
1 TABLET ORAL
Refills: 0 | DISCHARGE

## 2025-06-05 RX ORDER — FOLIC ACID 1 MG/1
1 TABLET ORAL DAILY
Refills: 0 | Status: DISCONTINUED | OUTPATIENT
Start: 2025-06-05 | End: 2025-06-08

## 2025-06-05 RX ORDER — OXYCODONE HYDROCHLORIDE 30 MG/1
5 TABLET ORAL EVERY 4 HOURS
Refills: 0 | Status: DISCONTINUED | OUTPATIENT
Start: 2025-06-05 | End: 2025-06-05

## 2025-06-05 RX ORDER — ACETAMINOPHEN 500 MG/5ML
650 LIQUID (ML) ORAL EVERY 6 HOURS
Refills: 0 | Status: DISCONTINUED | OUTPATIENT
Start: 2025-06-05 | End: 2025-06-07

## 2025-06-05 RX ORDER — TAMSULOSIN HYDROCHLORIDE 0.4 MG/1
0.8 CAPSULE ORAL AT BEDTIME
Refills: 0 | Status: DISCONTINUED | OUTPATIENT
Start: 2025-06-05 | End: 2025-06-08

## 2025-06-05 RX ORDER — DEXTROAMPHETAMINE SACCHARATE, AMPHETAMINE ASPARTATE MONOHYDRATE, DEXTROAMPHETAMINE SULFATE AND AMPHETAMINE SULFATE 2.5; 2.5; 2.5; 2.5 MG/1; MG/1; MG/1; MG/1
1 CAPSULE, EXTENDED RELEASE ORAL
Refills: 0 | DISCHARGE

## 2025-06-05 RX ORDER — HYDROMORPHONE/SOD CHLOR,ISO/PF 2 MG/10 ML
0.5 SYRINGE (ML) INJECTION ONCE
Refills: 0 | Status: DISCONTINUED | OUTPATIENT
Start: 2025-06-05 | End: 2025-06-05

## 2025-06-05 RX ORDER — PHENAZOPYRIDINE HCL 100 MG
200 TABLET ORAL THREE TIMES A DAY
Refills: 0 | Status: DISCONTINUED | OUTPATIENT
Start: 2025-06-05 | End: 2025-06-08

## 2025-06-05 RX ORDER — IRON SUCROSE 20 MG/ML
100 INJECTION, SOLUTION INTRAVENOUS EVERY 24 HOURS
Refills: 0 | Status: COMPLETED | OUTPATIENT
Start: 2025-06-05 | End: 2025-06-07

## 2025-06-05 RX ORDER — ALPRAZOLAM 0.5 MG
1 TABLET, EXTENDED RELEASE 24 HR ORAL AT BEDTIME
Refills: 0 | Status: DISCONTINUED | OUTPATIENT
Start: 2025-06-05 | End: 2025-06-08

## 2025-06-05 RX ORDER — CEFTRIAXONE 500 MG/1
1000 INJECTION, POWDER, FOR SOLUTION INTRAMUSCULAR; INTRAVENOUS ONCE
Refills: 0 | Status: COMPLETED | OUTPATIENT
Start: 2025-06-05 | End: 2025-06-05

## 2025-06-05 RX ORDER — OXYBUTYNIN CHLORIDE 5 MG/1
5 TABLET, FILM COATED, EXTENDED RELEASE ORAL THREE TIMES A DAY
Refills: 0 | Status: DISCONTINUED | OUTPATIENT
Start: 2025-06-05 | End: 2025-06-08

## 2025-06-05 RX ORDER — KETOROLAC TROMETHAMINE 30 MG/ML
30 INJECTION, SOLUTION INTRAMUSCULAR; INTRAVENOUS ONCE
Refills: 0 | Status: DISCONTINUED | OUTPATIENT
Start: 2025-06-05 | End: 2025-06-05

## 2025-06-05 RX ORDER — B1/B2/B3/B5/B6/B12/VIT C/FOLIC 500-0.5 MG
1 TABLET ORAL DAILY
Refills: 0 | Status: DISCONTINUED | OUTPATIENT
Start: 2025-06-05 | End: 2025-06-08

## 2025-06-05 RX ORDER — CEFTRIAXONE 500 MG/1
1000 INJECTION, POWDER, FOR SOLUTION INTRAMUSCULAR; INTRAVENOUS EVERY 24 HOURS
Refills: 0 | Status: COMPLETED | OUTPATIENT
Start: 2025-06-05 | End: 2025-06-07

## 2025-06-05 RX ADMIN — OXYBUTYNIN CHLORIDE 5 MILLIGRAM(S): 5 TABLET, FILM COATED, EXTENDED RELEASE ORAL at 23:47

## 2025-06-05 RX ADMIN — Medication 200 MILLIGRAM(S): at 18:45

## 2025-06-05 RX ADMIN — Medication 1 TABLET(S): at 12:57

## 2025-06-05 RX ADMIN — Medication 2000 MILLILITER(S): at 00:52

## 2025-06-05 RX ADMIN — Medication 200 MILLIGRAM(S): at 23:47

## 2025-06-05 RX ADMIN — CEFTRIAXONE 1000 MILLIGRAM(S): 500 INJECTION, POWDER, FOR SOLUTION INTRAMUSCULAR; INTRAVENOUS at 00:53

## 2025-06-05 RX ADMIN — OXYCODONE HYDROCHLORIDE 5 MILLIGRAM(S): 30 TABLET ORAL at 02:56

## 2025-06-05 RX ADMIN — OXYCODONE HYDROCHLORIDE 5 MILLIGRAM(S): 30 TABLET ORAL at 20:50

## 2025-06-05 RX ADMIN — OXYCODONE HYDROCHLORIDE 5 MILLIGRAM(S): 30 TABLET ORAL at 19:55

## 2025-06-05 RX ADMIN — IRON SUCROSE 210 MILLIGRAM(S): 20 INJECTION, SOLUTION INTRAVENOUS at 23:49

## 2025-06-05 RX ADMIN — OXYBUTYNIN CHLORIDE 5 MILLIGRAM(S): 5 TABLET, FILM COATED, EXTENDED RELEASE ORAL at 09:56

## 2025-06-05 RX ADMIN — OXYBUTYNIN CHLORIDE 5 MILLIGRAM(S): 5 TABLET, FILM COATED, EXTENDED RELEASE ORAL at 18:46

## 2025-06-05 RX ADMIN — TAMSULOSIN HYDROCHLORIDE 0.8 MILLIGRAM(S): 0.4 CAPSULE ORAL at 23:48

## 2025-06-05 RX ADMIN — OXYCODONE HYDROCHLORIDE 5 MILLIGRAM(S): 30 TABLET ORAL at 23:55

## 2025-06-05 RX ADMIN — CEFTRIAXONE 1000 MILLIGRAM(S): 500 INJECTION, POWDER, FOR SOLUTION INTRAMUSCULAR; INTRAVENOUS at 23:48

## 2025-06-05 RX ADMIN — OXYCODONE HYDROCHLORIDE 5 MILLIGRAM(S): 30 TABLET ORAL at 08:12

## 2025-06-05 RX ADMIN — Medication 0.5 MILLIGRAM(S): at 04:45

## 2025-06-05 RX ADMIN — OXYCODONE HYDROCHLORIDE 5 MILLIGRAM(S): 30 TABLET ORAL at 08:42

## 2025-06-05 RX ADMIN — OXYCODONE HYDROCHLORIDE 5 MILLIGRAM(S): 30 TABLET ORAL at 15:42

## 2025-06-05 RX ADMIN — OXYCODONE HYDROCHLORIDE 5 MILLIGRAM(S): 30 TABLET ORAL at 16:12

## 2025-06-05 RX ADMIN — Medication 200 MILLIGRAM(S): at 05:24

## 2025-06-05 RX ADMIN — KETOROLAC TROMETHAMINE 30 MILLIGRAM(S): 30 INJECTION, SOLUTION INTRAMUSCULAR; INTRAVENOUS at 10:27

## 2025-06-05 RX ADMIN — KETOROLAC TROMETHAMINE 30 MILLIGRAM(S): 30 INJECTION, SOLUTION INTRAMUSCULAR; INTRAVENOUS at 09:57

## 2025-06-05 NOTE — CONSULT NOTE ADULT - ASSESSMENT
33 y/o male with hx of right kidney stone, s/p cysto and stent placement 5/28, return to ER for increased pain    - admit to Medicine for pain control   - f/u Ucx. start Rocephin empirically.   - IVF bolus x 3 L  - monitor I&Os  - NPO after MN  - f/u AM labs      d/w Dr. Slava Arcos PA-C

## 2025-06-05 NOTE — H&P ADULT - ASSESSMENT
34M with PMHx of ADHD, peptic ulcer disease, GERD, kidney stones, and prior cholecystectomy. Recently admitted for nephrolithiasis saw Dr Pedersen had a Stent placed and discharged. Presenting to  for similar pain, and has been having little urine output, patient states he has attempted to urinate however only a small amount comes out in dribbles. patient reports pain to the RLQ and intermittent flank pain , denies testicular pain, hematuria. Pt was suppose to see urology tomorrow in the clinic but came in due to the pain. In the ED hr was 127, bp elevated. wbc 11, hemoglobin 9.3, CT imaging showed a R sided stone 9mm and stent. Urology consulted.       Assessment/Plan:    # Sepsis on admission 2/2 UTI complicated by  Nephrolithiasis s/p stent placement    # bladder wall calcifications     - blood and urine cultures  - urine strain  - flomax  - Tylenol and oxy for pain control  - 2L IVF  - Ceftriaxone  - urology consulted    #ROSEMARY 2/2 above     - IVF   - bladder scan     # Microcytic anemia     - anemia panel     Code status: Full  Diet: regular  DVT ppx SCD

## 2025-06-05 NOTE — CONSULT NOTE ADULT - SUBJECTIVE AND OBJECTIVE BOX
HPI: 4 year old male with PMHx of anxiety, right kidney stone s/p cytoscopy and stent placement 25. presents to ED c/o decreased UOP and increased right sided abd pain in last 48 hours. Patient reports poor appetite and difficulty drinking and eating 2t2 pain. CT shows right ureter stent in place, 9mm stone at proximal ureter similar to prior scan. mild right hydro. Urology consulted. patient is able to void spontaneously while waiting in ER    PAST MEDICAL & SURGICAL HISTORY:  H/O esophageal ulcer      Attention deficit hyperkinetic disorder      Anxiety      ADHD      Stomach ulcer      Esophagitis      Insomnia      Hematuria  microscopic      COVID-19 vaccine series completed  Pfizer. 2nd dose "May or "      Adult acne      GERD (gastroesophageal reflux disease)      S/P laparoscopic cholecystectomy  2021      History of endoscopy  upper      S/P cystoscopy      History of biopsy of bladder          REVIEW OF SYSTEMS    see above    MEDICATIONS  (STANDING):  cefTRIAXone Injectable. 1000 milliGRAM(s) IV Push Once  sodium chloride 0.9% Bolus 2000 milliLiter(s) IV Bolus once    MEDICATIONS  (PRN):      Allergies    Prozac (Vomiting)  Red wine allergy (Vomiting)    Intolerances        SOCIAL HISTORY:    FAMILY HISTORY:  Family history of renal cancer (Father)  with metastasis    Family history of diabetes mellitus (DM) (Grandparent)        Vital Signs Last 24 Hrs  T(C): 36.7 (2025 21:13), Max: 36.7 (2025 21:13)  T(F): 98.1 (2025 21:13), Max: 98.1 (2025 21:13)  HR: 127 (2025 21:13) (127 - 127)  BP: 152/97 (2025 21:13) (152/97 - 152/97)  BP(mean): 109 (2025 21:13) (109 - 109)  RR: 20 (2025 21:13) (20 - 20)  SpO2: 95% (2025 21:13) (95% - 95%)    Parameters below as of 2025 21:13  Patient On (Oxygen Delivery Method): room air        PHYSICAL EXAM:    GENERAL: No acute distress, lying comfortably in bed  HEAD:  Atraumatic, Normocephalic  CHEST/LUNG: Non labored respirations, no accessory muscle use.   HEART: Regular rate and rhythm; No murmurs, rubs, or gallops  ABDOMEN: Soft, non-tender, non-distended; no palpable bladder, no suprapubic tenderness  BACK: + right CVAT  EXT: calves non-tender b/l, no edema  NEUROLOGY: A&O x 3, no focal deficits        LABS:                        9.3    11.09 )-----------( 534      ( 2025 21:59 )             29.0     06-04    137  |  109[H]  |  19  ----------------------------<  126[H]  3.7   |  24  |  1.36[H]    Ca    9.0      2025 21:59    TPro  7.9  /  Alb  3.7  /  TBili  0.2  /  DBili  x   /  AST  25  /  ALT  46  /  AlkPhos  98  06-04      Urinalysis Basic - ( 2025 21:59 )    Color: Yellow / Appearance: Cloudy / S.025 / pH: x  Gluc: 126 mg/dL / Ketone: x  / Bili: Negative / Urobili: 0.2 mg/dL   Blood: x / Protein: 300 mg/dL / Nitrite: Negative   Leuk Esterase: Moderate / RBC: 750 /HPF /  /HPF   Sq Epi: x / Non Sq Epi: 1 /HPF / Bacteria: Negative /HPF        RADIOLOGY & ADDITIONAL STUDIES:    < from: CT Abdomen and Pelvis No Cont (25 @ 22:35) >  FINDINGS:  Evaluation of the solid organs, vascular structures andGI tract is   limited without oral and IV contrast. Within this constraint:    LOWER CHEST: Within normal limits.    LIVER: Within normal limits.  BILE DUCTS: Biliary ductal dilation compatible with postcholecystectomy   state..  GALLBLADDER: Cholecystectomy.  SPLEEN: Within normal limits.  PANCREAS: Within normal limits.  ADRENALS: Within normal limits.  KIDNEYS/URETERS: Interval placement of right double-J ureteral stent with   mild residual right hydroureteronephrosis. 9 mm stone in the proximal   right ureter similar to prior. Multiple simple parapelvic cysts on the   right redemonstrated. No hydronephrosis on the left.    BLADDER: Similar appearance of the bladder with prominent wall thickening   and surrounding inflammatory change with multiple wall calcifications.  REPRODUCTIVE ORGANS: Prostate within normal limits.    BOWEL: No bowel obstruction. Colonic diverticulosis. Appendix is normal.  PERITONEUM/RETROPERITONEUM: Within normal limits.  VESSELS: No aortic aneurysm  LYMPH NODES: No lymphadenopathy.  ABDOMINAL WALL: Within normal limits.  BONES: Within normal limits.    IMPRESSION:  Interval placement of right double-J ureteral stent with mild residual   right hydroureteronephrosis. 9 mm stone in the proximal right ureter   similar to prior.    Similar appearance of the bladder with prominent wall thickening and   surrounding inflammatory change with multiple wall calcifications. Given   presence of calcifications, correlation for underlying Schistosomiasis   recommended.    < end of copied text >

## 2025-06-05 NOTE — H&P ADULT - HISTORY OF PRESENT ILLNESS
HPI:  34M with PMHx of ADHD, peptic ulcer disease, GERD, kidney stones, and prior cholecystectomy. Recently admitted for nephrolithiasis saw Dr Pedersen had a Stent placed and discharged. Presenting to  for similar pain, and has been having little urine output, patient states he has attempted to urinate however only a small amount comes out in dribbles. patient reports pain to the RLQ and intermittent flank pain , denies testicular pain, hematuria. Pt was suppose to see urology tomorrow in the clinic but came in due to the pain. In the ED hr was 127, bp elevated. wbc 11, hemoglobin 9.3, CT imaging showed a R sided stone 9mm and stent. Urology consulted.     PAST MEDICAL & SURGICAL HISTORY:  H/O esophageal ulcer      Attention deficit hyperkinetic disorder      Anxiety      ADHD      Stomach ulcer      Esophagitis      Insomnia      Hematuria  microscopic      COVID-19 vaccine series completed  Pfizer. 2nd dose "May or "      Adult acne      GERD (gastroesophageal reflux disease)      S/P laparoscopic cholecystectomy  2021      History of endoscopy  upper      S/P cystoscopy      History of biopsy of bladder        FAMILY HISTORY:  Family history of renal cancer (Father)  with metastasis    Family history of diabetes mellitus (DM) (Grandparent)      Social History:      Allergies    Prozac (Vomiting)  Red wine allergy (Vomiting)    Intolerances        MEDICATIONS  (STANDING):  cefTRIAXone Injectable. 1000 milliGRAM(s) IV Push every 24 hours    MEDICATIONS  (PRN):  acetaminophen     Tablet .. 650 milliGRAM(s) Oral every 6 hours PRN Temp greater or equal to 38C (100.4F), Mild Pain (1 - 3)  aluminum hydroxide/magnesium hydroxide/simethicone Suspension 30 milliLiter(s) Oral every 4 hours PRN Dyspepsia  melatonin 3 milliGRAM(s) Oral at bedtime PRN Insomnia  ondansetron Injectable 4 milliGRAM(s) IV Push every 8 hours PRN Nausea and/or Vomiting  oxyCODONE    IR 5 milliGRAM(s) Oral every 4 hours PRN Severe Pain (7 - 10)      ROS:  10 point ROS negative except for ones mentioned in HPI    PE   HEENT:  Head is normocephalic    Skin:  Warm and dry without any rash   NECK:  Supple without lymphadenopathy.   HEART:  Regular rate and rhythm. normal S1 and S2, No M/R/G  LUNGS:  Good ins/exp effort, no W/R/R/C  ABDOMEN:  Soft, nontender, nondistended with good bowel sounds heard  EXTREMITIES:  Without cyanosis, clubbing or edema.   NEUROLOGICAL:  Gross nonfocal      PEx  T(C): 36.7 (25 @ 21:13), Max: 36.7 (25 @ 21:13)  HR: 127 (25 @ 21:13) (127 - 127)  BP: 152/97 (25 @ 21:13) (152/97 - 152/97)  RR: 20 (25 @ 21:13) (20 - 20)  SpO2: 95% (25 @ 21:13) (95% - 95%)  Wt(kg): --                        9.3    11.09 )-----------( 534      ( 2025 21:59 )             29.0         137  |  109[H]  |  19  ----------------------------<  126[H]  3.7   |  24  |  1.36[H]    Ca    9.0      2025 21:59    TPro  7.9  /  Alb  3.7  /  TBili  0.2  /  DBili  x   /  AST  25  /  ALT  46  /  AlkPhos  98  06-04    CAPILLARY BLOOD GLUCOSE          Urinalysis Basic - ( 2025 21:59 )    Color: Yellow / Appearance: Cloudy / S.025 / pH: x  Gluc: 126 mg/dL / Ketone: x  / Bili: Negative / Urobili: 0.2 mg/dL   Blood: x / Protein: 300 mg/dL / Nitrite: Negative   Leuk Esterase: Moderate / RBC: 750 /HPF /  /HPF   Sq Epi: x / Non Sq Epi: 1 /HPF / Bacteria: Negative /HPF          Urinalysis with Rflx Culture (collected 25 @ 21:59)            Radiology/Imaging, I have personally reviewed:    Interval placement of right double-J ureteral stent with mild residual   right hydroureteronephrosis. 9 mm stone in the proximal right ureter   similar to prior.    Similar appearance of the bladder with prominent wall thickening and   surrounding inflammatory change with multiple wall calcifications. Given   presence of calcifications, correlation for underlying Schistosomiasis   recommended.

## 2025-06-05 NOTE — PATIENT PROFILE ADULT - NSPROHMSYMPCOND_GEN_A_NUR
Hx- ADHD; Anxiety; Reports trouble sleeping at night- denied MORENO/CPAP/behavioral health/genitourinary/sleep

## 2025-06-05 NOTE — PATIENT PROFILE ADULT - FUNCTIONAL ASSESSMENT - BASIC MOBILITY 6.
4-calculated by average/Not able to assess (calculate score using [facility] averaging method)

## 2025-06-05 NOTE — CONSULT NOTE ADULT - NS ATTEND AMEND GEN_ALL_CORE FT
Right ureteral stone s/p stent last week. Now with unrelenting pain not controlled with oral medication.   Plan for possible OR tomorrow for right ureteroscopy, LL, stent exchange.

## 2025-06-06 ENCOUNTER — RESULT REVIEW (OUTPATIENT)
Age: 35
End: 2025-06-06

## 2025-06-06 LAB
24R-OH-CALCIDIOL SERPL-MCNC: 6.6 NG/ML — LOW
ALBUMIN SERPL ELPH-MCNC: 3.2 G/DL — LOW (ref 3.3–5)
ALP SERPL-CCNC: 96 U/L — SIGNIFICANT CHANGE UP (ref 40–120)
ALT FLD-CCNC: 39 U/L — SIGNIFICANT CHANGE UP (ref 12–78)
ANION GAP SERPL CALC-SCNC: 3 MMOL/L — LOW (ref 5–17)
AST SERPL-CCNC: 15 U/L — SIGNIFICANT CHANGE UP (ref 15–37)
BASOPHILS # BLD AUTO: 0.03 K/UL — SIGNIFICANT CHANGE UP (ref 0–0.2)
BASOPHILS NFR BLD AUTO: 0.4 % — SIGNIFICANT CHANGE UP (ref 0–2)
BILIRUB SERPL-MCNC: 0.2 MG/DL — SIGNIFICANT CHANGE UP (ref 0.2–1.2)
BUN SERPL-MCNC: 12 MG/DL — SIGNIFICANT CHANGE UP (ref 7–23)
CALCIUM SERPL-MCNC: 8.7 MG/DL — SIGNIFICANT CHANGE UP (ref 8.5–10.1)
CHLORIDE SERPL-SCNC: 108 MMOL/L — SIGNIFICANT CHANGE UP (ref 96–108)
CK SERPL-CCNC: 52 U/L — SIGNIFICANT CHANGE UP (ref 26–308)
CO2 SERPL-SCNC: 24 MMOL/L — SIGNIFICANT CHANGE UP (ref 22–31)
CREAT SERPL-MCNC: 0.87 MG/DL — SIGNIFICANT CHANGE UP (ref 0.5–1.3)
CRP SERPL-MCNC: 4.5 MG/L — SIGNIFICANT CHANGE UP (ref 0–5)
CULTURE RESULTS: NO GROWTH — SIGNIFICANT CHANGE UP
EGFR: 117 ML/MIN/1.73M2 — SIGNIFICANT CHANGE UP
EGFR: 117 ML/MIN/1.73M2 — SIGNIFICANT CHANGE UP
EOSINOPHIL # BLD AUTO: 0.13 K/UL — SIGNIFICANT CHANGE UP (ref 0–0.5)
EOSINOPHIL NFR BLD AUTO: 1.6 % — SIGNIFICANT CHANGE UP (ref 0–6)
GLUCOSE SERPL-MCNC: 95 MG/DL — SIGNIFICANT CHANGE UP (ref 70–99)
HCT VFR BLD CALC: 27.5 % — LOW (ref 39–50)
HGB BLD-MCNC: 8.6 G/DL — LOW (ref 13–17)
IMM GRANULOCYTES # BLD AUTO: 0.02 K/UL — SIGNIFICANT CHANGE UP (ref 0–0.07)
IMM GRANULOCYTES NFR BLD AUTO: 0.2 % — SIGNIFICANT CHANGE UP (ref 0–0.9)
LYMPHOCYTES # BLD AUTO: 1.2 K/UL — SIGNIFICANT CHANGE UP (ref 1–3.3)
LYMPHOCYTES NFR BLD AUTO: 15 % — SIGNIFICANT CHANGE UP (ref 13–44)
MAGNESIUM SERPL-MCNC: 2 MG/DL — SIGNIFICANT CHANGE UP (ref 1.6–2.6)
MCHC RBC-ENTMCNC: 24.6 PG — LOW (ref 27–34)
MCHC RBC-ENTMCNC: 31.3 G/DL — LOW (ref 32–36)
MCV RBC AUTO: 78.6 FL — LOW (ref 80–100)
MONOCYTES # BLD AUTO: 0.52 K/UL — SIGNIFICANT CHANGE UP (ref 0–0.9)
MONOCYTES NFR BLD AUTO: 6.5 % — SIGNIFICANT CHANGE UP (ref 2–14)
NEUTROPHILS # BLD AUTO: 6.12 K/UL — SIGNIFICANT CHANGE UP (ref 1.8–7.4)
NEUTROPHILS NFR BLD AUTO: 76.3 % — SIGNIFICANT CHANGE UP (ref 43–77)
NRBC # BLD AUTO: 0 K/UL — SIGNIFICANT CHANGE UP (ref 0–0)
NRBC # FLD: 0 K/UL — SIGNIFICANT CHANGE UP (ref 0–0)
NRBC BLD AUTO-RTO: 0 /100 WBCS — SIGNIFICANT CHANGE UP (ref 0–0)
PHOSPHATE SERPL-MCNC: 3 MG/DL — SIGNIFICANT CHANGE UP (ref 2.5–4.5)
PLATELET # BLD AUTO: 450 K/UL — HIGH (ref 150–400)
PMV BLD: 9.6 FL — SIGNIFICANT CHANGE UP (ref 7–13)
POTASSIUM SERPL-MCNC: 3.7 MMOL/L — SIGNIFICANT CHANGE UP (ref 3.5–5.3)
POTASSIUM SERPL-SCNC: 3.7 MMOL/L — SIGNIFICANT CHANGE UP (ref 3.5–5.3)
PROCALCITONIN SERPL-MCNC: 0.03 NG/ML — SIGNIFICANT CHANGE UP (ref 0.02–0.1)
PROT SERPL-MCNC: 6.9 GM/DL — SIGNIFICANT CHANGE UP (ref 6–8.3)
RBC # BLD: 3.5 M/UL — LOW (ref 4.2–5.8)
RBC # FLD: 13.2 % — SIGNIFICANT CHANGE UP (ref 10.3–14.5)
SODIUM SERPL-SCNC: 135 MMOL/L — SIGNIFICANT CHANGE UP (ref 135–145)
SPECIMEN SOURCE: SIGNIFICANT CHANGE UP
TSH SERPL-MCNC: 0.91 UU/ML — SIGNIFICANT CHANGE UP (ref 0.34–4.82)
URATE SERPL-MCNC: 2.3 MG/DL — LOW (ref 3.4–8.8)
WBC # BLD: 8.02 K/UL — SIGNIFICANT CHANGE UP (ref 3.8–10.5)
WBC # FLD AUTO: 8.02 K/UL — SIGNIFICANT CHANGE UP (ref 3.8–10.5)

## 2025-06-06 PROCEDURE — 74420 UROGRAPHY RTRGR +-KUB: CPT | Mod: 26,RT

## 2025-06-06 PROCEDURE — 99232 SBSQ HOSP IP/OBS MODERATE 35: CPT

## 2025-06-06 PROCEDURE — 88300 SURGICAL PATH GROSS: CPT | Mod: 26

## 2025-06-06 PROCEDURE — 52356 CYSTO/URETERO W/LITHOTRIPSY: CPT | Mod: RT

## 2025-06-06 RX ORDER — ERGOCALCIFEROL 1.25 MG/1
50000 CAPSULE ORAL ONCE
Refills: 0 | Status: COMPLETED | OUTPATIENT
Start: 2025-06-06 | End: 2025-06-06

## 2025-06-06 RX ORDER — ONDANSETRON HCL/PF 4 MG/2 ML
4 VIAL (ML) INJECTION ONCE
Refills: 0 | Status: DISCONTINUED | OUTPATIENT
Start: 2025-06-06 | End: 2025-06-06

## 2025-06-06 RX ORDER — OXYCODONE HYDROCHLORIDE 30 MG/1
5 TABLET ORAL ONCE
Refills: 0 | Status: DISCONTINUED | OUTPATIENT
Start: 2025-06-06 | End: 2025-06-06

## 2025-06-06 RX ORDER — SODIUM CHLORIDE 9 G/1000ML
1000 INJECTION, SOLUTION INTRAVENOUS
Refills: 0 | Status: DISCONTINUED | OUTPATIENT
Start: 2025-06-06 | End: 2025-06-06

## 2025-06-06 RX ORDER — FENTANYL CITRATE-0.9 % NACL/PF 100MCG/2ML
50 SYRINGE (ML) INTRAVENOUS
Refills: 0 | Status: DISCONTINUED | OUTPATIENT
Start: 2025-06-06 | End: 2025-06-06

## 2025-06-06 RX ORDER — POTASSIUM CHLORIDE, DEXTROSE MONOHYDRATE AND SODIUM CHLORIDE 150; 5; 900 MG/100ML; G/100ML; MG/100ML
1000 INJECTION, SOLUTION INTRAVENOUS
Refills: 0 | Status: DISCONTINUED | OUTPATIENT
Start: 2025-06-06 | End: 2025-06-06

## 2025-06-06 RX ORDER — HYDROMORPHONE/SOD CHLOR,ISO/PF 2 MG/10 ML
0.5 SYRINGE (ML) INJECTION
Refills: 0 | Status: DISCONTINUED | OUTPATIENT
Start: 2025-06-06 | End: 2025-06-06

## 2025-06-06 RX ADMIN — FOLIC ACID 1 MILLIGRAM(S): 1 TABLET ORAL at 10:54

## 2025-06-06 RX ADMIN — Medication 0.5 MILLIGRAM(S): at 17:30

## 2025-06-06 RX ADMIN — OXYCODONE HYDROCHLORIDE 5 MILLIGRAM(S): 30 TABLET ORAL at 17:27

## 2025-06-06 RX ADMIN — OXYCODONE HYDROCHLORIDE 5 MILLIGRAM(S): 30 TABLET ORAL at 03:51

## 2025-06-06 RX ADMIN — Medication 0.5 MILLIGRAM(S): at 17:13

## 2025-06-06 RX ADMIN — Medication 0.5 MILLIGRAM(S): at 10:53

## 2025-06-06 RX ADMIN — OXYCODONE HYDROCHLORIDE 5 MILLIGRAM(S): 30 TABLET ORAL at 04:38

## 2025-06-06 RX ADMIN — Medication 0.5 MILLIGRAM(S): at 02:30

## 2025-06-06 RX ADMIN — Medication 0.5 MILLIGRAM(S): at 18:07

## 2025-06-06 RX ADMIN — CYANOCOBALAMIN 500 MICROGRAM(S): 1000 INJECTION INTRAMUSCULAR; SUBCUTANEOUS at 10:54

## 2025-06-06 RX ADMIN — OXYCODONE HYDROCHLORIDE 5 MILLIGRAM(S): 30 TABLET ORAL at 00:15

## 2025-06-06 RX ADMIN — OXYCODONE HYDROCHLORIDE 5 MILLIGRAM(S): 30 TABLET ORAL at 22:02

## 2025-06-06 RX ADMIN — OXYBUTYNIN CHLORIDE 5 MILLIGRAM(S): 5 TABLET, FILM COATED, EXTENDED RELEASE ORAL at 17:27

## 2025-06-06 RX ADMIN — OXYCODONE HYDROCHLORIDE 5 MILLIGRAM(S): 30 TABLET ORAL at 07:49

## 2025-06-06 RX ADMIN — Medication 0.5 MILLIGRAM(S): at 01:55

## 2025-06-06 RX ADMIN — Medication 1 TABLET(S): at 10:53

## 2025-06-06 RX ADMIN — OXYCODONE HYDROCHLORIDE 5 MILLIGRAM(S): 30 TABLET ORAL at 17:48

## 2025-06-06 RX ADMIN — OXYCODONE HYDROCHLORIDE 5 MILLIGRAM(S): 30 TABLET ORAL at 22:45

## 2025-06-06 RX ADMIN — ERGOCALCIFEROL 50000 UNIT(S): 1.25 CAPSULE ORAL at 22:02

## 2025-06-06 RX ADMIN — Medication 0.5 MILLIGRAM(S): at 22:53

## 2025-06-06 RX ADMIN — SODIUM CHLORIDE 75 MILLILITER(S): 9 INJECTION, SOLUTION INTRAVENOUS at 18:00

## 2025-06-06 RX ADMIN — CEFTRIAXONE 1000 MILLIGRAM(S): 500 INJECTION, POWDER, FOR SOLUTION INTRAMUSCULAR; INTRAVENOUS at 22:02

## 2025-06-06 RX ADMIN — Medication 0.5 MILLIGRAM(S): at 23:10

## 2025-06-06 NOTE — BRIEF OPERATIVE NOTE - NSICDXBRIEFPROCEDURE_GEN_ALL_CORE_FT
PROCEDURES:  Cystoscopy, with ureteroscopy, laser lithotripsy, and ureteral stent insertion 06-Jun-2025 17:29:51 right Cornell Pedersen

## 2025-06-06 NOTE — BRIEF OPERATIVE NOTE - OPERATION/FINDINGS
cystoscopy showed a low capacity bladder with mild trabeculations and several bladder wall calcifications  right ureteroscopy with large stone in the proximal ureter  right ureteral stent exchanged

## 2025-06-07 ENCOUNTER — TRANSCRIPTION ENCOUNTER (OUTPATIENT)
Age: 35
End: 2025-06-07

## 2025-06-07 LAB
ALBUMIN SERPL ELPH-MCNC: 3.2 G/DL — LOW (ref 3.3–5)
ALP SERPL-CCNC: 104 U/L — SIGNIFICANT CHANGE UP (ref 40–120)
ALT FLD-CCNC: 35 U/L — SIGNIFICANT CHANGE UP (ref 12–78)
ANION GAP SERPL CALC-SCNC: 4 MMOL/L — LOW (ref 5–17)
AST SERPL-CCNC: 15 U/L — SIGNIFICANT CHANGE UP (ref 15–37)
BILIRUB DIRECT SERPL-MCNC: <0.1 MG/DL — SIGNIFICANT CHANGE UP (ref 0–0.3)
BILIRUB INDIRECT FLD-MCNC: >0 MG/DL — LOW (ref 0.2–1)
BILIRUB SERPL-MCNC: 0.1 MG/DL — LOW (ref 0.2–1.2)
BUN SERPL-MCNC: 14 MG/DL — SIGNIFICANT CHANGE UP (ref 7–23)
CALCIUM SERPL-MCNC: 9 MG/DL — SIGNIFICANT CHANGE UP (ref 8.5–10.1)
CHLORIDE SERPL-SCNC: 105 MMOL/L — SIGNIFICANT CHANGE UP (ref 96–108)
CO2 SERPL-SCNC: 26 MMOL/L — SIGNIFICANT CHANGE UP (ref 22–31)
CREAT SERPL-MCNC: 0.92 MG/DL — SIGNIFICANT CHANGE UP (ref 0.5–1.3)
EGFR: 113 ML/MIN/1.73M2 — SIGNIFICANT CHANGE UP
EGFR: 113 ML/MIN/1.73M2 — SIGNIFICANT CHANGE UP
GLUCOSE SERPL-MCNC: 101 MG/DL — HIGH (ref 70–99)
HCT VFR BLD CALC: 28.9 % — LOW (ref 39–50)
HGB BLD-MCNC: 9.1 G/DL — LOW (ref 13–17)
MAGNESIUM SERPL-MCNC: 2.1 MG/DL — SIGNIFICANT CHANGE UP (ref 1.6–2.6)
MCHC RBC-ENTMCNC: 24.9 PG — LOW (ref 27–34)
MCHC RBC-ENTMCNC: 31.5 G/DL — LOW (ref 32–36)
MCV RBC AUTO: 79 FL — LOW (ref 80–100)
NRBC # BLD AUTO: 0 K/UL — SIGNIFICANT CHANGE UP (ref 0–0)
NRBC # FLD: 0 K/UL — SIGNIFICANT CHANGE UP (ref 0–0)
NRBC BLD AUTO-RTO: 0 /100 WBCS — SIGNIFICANT CHANGE UP (ref 0–0)
PHOSPHATE SERPL-MCNC: 3.2 MG/DL — SIGNIFICANT CHANGE UP (ref 2.5–4.5)
PLATELET # BLD AUTO: 502 K/UL — HIGH (ref 150–400)
PMV BLD: 9.7 FL — SIGNIFICANT CHANGE UP (ref 7–13)
POTASSIUM SERPL-MCNC: 4 MMOL/L — SIGNIFICANT CHANGE UP (ref 3.5–5.3)
POTASSIUM SERPL-SCNC: 4 MMOL/L — SIGNIFICANT CHANGE UP (ref 3.5–5.3)
PROT SERPL-MCNC: 7.1 GM/DL — SIGNIFICANT CHANGE UP (ref 6–8.3)
RBC # BLD: 3.66 M/UL — LOW (ref 4.2–5.8)
RBC # FLD: 13 % — SIGNIFICANT CHANGE UP (ref 10.3–14.5)
SODIUM SERPL-SCNC: 135 MMOL/L — SIGNIFICANT CHANGE UP (ref 135–145)
WBC # BLD: 9.32 K/UL — SIGNIFICANT CHANGE UP (ref 3.8–10.5)
WBC # FLD AUTO: 9.32 K/UL — SIGNIFICANT CHANGE UP (ref 3.8–10.5)

## 2025-06-07 PROCEDURE — 99233 SBSQ HOSP IP/OBS HIGH 50: CPT

## 2025-06-07 RX ORDER — LIDOCAINE HYDROCHLORIDE 20 MG/ML
2 JELLY TOPICAL DAILY
Refills: 0 | Status: DISCONTINUED | OUTPATIENT
Start: 2025-06-07 | End: 2025-06-08

## 2025-06-07 RX ORDER — NALOXONE HYDROCHLORIDE 0.4 MG/ML
4 INJECTION, SOLUTION INTRAMUSCULAR; INTRAVENOUS; SUBCUTANEOUS
Qty: 1 | Refills: 0
Start: 2025-06-07 | End: 2025-06-07

## 2025-06-07 RX ORDER — ACETAMINOPHEN 500 MG/5ML
1000 LIQUID (ML) ORAL ONCE
Refills: 0 | Status: COMPLETED | OUTPATIENT
Start: 2025-06-07 | End: 2025-06-07

## 2025-06-07 RX ORDER — PHENAZOPYRIDINE HCL 100 MG
1 TABLET ORAL
Qty: 9 | Refills: 0
Start: 2025-06-07 | End: 2025-06-09

## 2025-06-07 RX ORDER — KETOROLAC TROMETHAMINE 30 MG/ML
1 INJECTION, SOLUTION INTRAMUSCULAR; INTRAVENOUS
Qty: 9 | Refills: 0
Start: 2025-06-07 | End: 2025-06-09

## 2025-06-07 RX ORDER — OXYCODONE HYDROCHLORIDE 30 MG/1
1 TABLET ORAL
Qty: 20 | Refills: 0
Start: 2025-06-07 | End: 2025-06-11

## 2025-06-07 RX ORDER — KETOROLAC TROMETHAMINE 30 MG/ML
15 INJECTION, SOLUTION INTRAMUSCULAR; INTRAVENOUS EVERY 6 HOURS
Refills: 0 | Status: DISCONTINUED | OUTPATIENT
Start: 2025-06-07 | End: 2025-06-08

## 2025-06-07 RX ORDER — CYANOCOBALAMIN 1000 UG/ML
1 INJECTION INTRAMUSCULAR; SUBCUTANEOUS
Qty: 30 | Refills: 0
Start: 2025-06-07 | End: 2025-07-06

## 2025-06-07 RX ORDER — OXYBUTYNIN CHLORIDE 5 MG/1
1 TABLET, FILM COATED, EXTENDED RELEASE ORAL
Qty: 21 | Refills: 0
Start: 2025-06-07 | End: 2025-06-13

## 2025-06-07 RX ORDER — FOLIC ACID 1 MG/1
1 TABLET ORAL
Qty: 30 | Refills: 0
Start: 2025-06-07 | End: 2025-07-06

## 2025-06-07 RX ORDER — CEFUROXIME SODIUM 1.5 G
1 VIAL (EA) INJECTION
Qty: 10 | Refills: 0
Start: 2025-06-07 | End: 2025-06-11

## 2025-06-07 RX ORDER — ACETAMINOPHEN 500 MG/5ML
650 LIQUID (ML) ORAL EVERY 8 HOURS
Refills: 0 | Status: DISCONTINUED | OUTPATIENT
Start: 2025-06-07 | End: 2025-06-08

## 2025-06-07 RX ADMIN — CYANOCOBALAMIN 500 MICROGRAM(S): 1000 INJECTION INTRAMUSCULAR; SUBCUTANEOUS at 11:03

## 2025-06-07 RX ADMIN — Medication 200 MILLIGRAM(S): at 03:16

## 2025-06-07 RX ADMIN — Medication 15 MILLIGRAM(S): at 22:40

## 2025-06-07 RX ADMIN — IRON SUCROSE 210 MILLIGRAM(S): 20 INJECTION, SOLUTION INTRAVENOUS at 23:59

## 2025-06-07 RX ADMIN — Medication 85 MILLILITER(S): at 17:05

## 2025-06-07 RX ADMIN — Medication 400 MILLIGRAM(S): at 17:05

## 2025-06-07 RX ADMIN — Medication 1 TABLET(S): at 11:03

## 2025-06-07 RX ADMIN — Medication 85 MILLILITER(S): at 22:45

## 2025-06-07 RX ADMIN — Medication 200 MILLIGRAM(S): at 11:02

## 2025-06-07 RX ADMIN — OXYCODONE HYDROCHLORIDE 5 MILLIGRAM(S): 30 TABLET ORAL at 03:14

## 2025-06-07 RX ADMIN — Medication 650 MILLIGRAM(S): at 22:06

## 2025-06-07 RX ADMIN — Medication 15 MILLIGRAM(S): at 15:52

## 2025-06-07 RX ADMIN — Medication 0.5 MILLIGRAM(S): at 08:32

## 2025-06-07 RX ADMIN — FOLIC ACID 1 MILLIGRAM(S): 1 TABLET ORAL at 11:03

## 2025-06-07 RX ADMIN — CEFTRIAXONE 1000 MILLIGRAM(S): 500 INJECTION, POWDER, FOR SOLUTION INTRAMUSCULAR; INTRAVENOUS at 22:06

## 2025-06-07 RX ADMIN — OXYBUTYNIN CHLORIDE 5 MILLIGRAM(S): 5 TABLET, FILM COATED, EXTENDED RELEASE ORAL at 22:06

## 2025-06-07 RX ADMIN — Medication 0.5 MILLIGRAM(S): at 04:30

## 2025-06-07 RX ADMIN — OXYCODONE HYDROCHLORIDE 5 MILLIGRAM(S): 30 TABLET ORAL at 04:00

## 2025-06-07 RX ADMIN — Medication 2000 UNIT(S): at 22:07

## 2025-06-07 RX ADMIN — Medication 4 MILLIGRAM(S): at 20:59

## 2025-06-07 RX ADMIN — OXYCODONE HYDROCHLORIDE 5 MILLIGRAM(S): 30 TABLET ORAL at 07:42

## 2025-06-07 RX ADMIN — OXYCODONE HYDROCHLORIDE 5 MILLIGRAM(S): 30 TABLET ORAL at 11:50

## 2025-06-07 RX ADMIN — Medication 0.5 MILLIGRAM(S): at 04:14

## 2025-06-07 RX ADMIN — IRON SUCROSE 210 MILLIGRAM(S): 20 INJECTION, SOLUTION INTRAVENOUS at 00:01

## 2025-06-07 RX ADMIN — LIDOCAINE HYDROCHLORIDE 2 PATCH: 20 JELLY TOPICAL at 17:05

## 2025-06-07 RX ADMIN — Medication 0.5 MILLIGRAM(S): at 13:05

## 2025-06-07 RX ADMIN — Medication 30 MILLIGRAM(S): at 18:39

## 2025-06-07 NOTE — DISCHARGE NOTE PROVIDER - NSDCFUSCHEDAPPT_GEN_ALL_CORE_FT
Cornell Pedersen Physician [facility]  UROLOGY [address]  Scheduled Appointment: 06/10/2025

## 2025-06-07 NOTE — DISCHARGE NOTE PROVIDER - NSDCCPTREATMENT_GEN_ALL_CORE_FT
PRINCIPAL PROCEDURE  Procedure: Cystoscopy, with ureteroscopy, laser lithotripsy, and ureteral stent insertion  Findings and Treatment: Pre-Op Diagnosis  PRE-OP DIAGNOSIS:  Right ureteral stone 06-Jun-2025 17:30:14  Cornell Pedersen  Post-Op Diagnosis  POST-OP DIAGNOSIS:  Right ureteral stone 06-Jun-2025 17:30:23  Cornell Pedersen  Procedure  PROCEDURES:  Cystoscopy, with ureteroscopy, laser lithotripsy, and ureteral stent insertion 06-Jun-2025 17:29:51 right Cornell Pedersen  Operative Findings  cystoscopy showed a low capacity bladder with mild trabeculations and several bladder wall calcifications  right ureteroscopy with large stone in the proximal ureter  right ureteral stent exchanged  Specimens  right ureteral stone  Drains  6fr x 24cm right JJ ureteral stent  Estimated Blood Loss  2 milliLiter(s)  Antibiotic Protocol  Followed protocol  Venous Thromboembolism Prophylaxis Therapy  SCDs  Comments/Other Details  [id number]  Condition Post op  stable  Disposition  home        SECONDARY PROCEDURE  Procedure: Right retrograde pyelography  Findings and Treatment:     Procedure: Abdomen CT  Findings and Treatment: LOWER CHEST: Within normal limits.  LIVER: Within normal limits.  BILE DUCTS: Biliary ductal dilation compatible with postcholecystectomy   state..  GALLBLADDER: Cholecystectomy.  SPLEEN: Within normal limits.  PANCREAS: Within normal limits.  ADRENALS: Within normal limits.  KIDNEYS/URETERS: Interval placement of right double-J ureteral stent with   mild residual right hydroureteronephrosis. 9 mm stone in the proximal   right ureter similar to prior. Multiple simple parapelvic cysts on the   right redemonstrated. No hydronephrosis on the left.  BLADDER: Similar appearance of the bladder with prominent wall thickening   and surrounding inflammatory change with multiple wall calcifications.  REPRODUCTIVE ORGANS: Prostate within normal limits.  BOWEL: No bowel obstruction. Colonic diverticulosis. Appendix is normal.  PERITONEUM/RETROPERITONEUM: Within normal limits.  VESSELS: No aortic aneurysm  LYMPH NODES: No lymphadenopathy.  ABDOMINAL WALL: Within normal limits.  BONES: Within normal limits.  IMPRESSION:  Interval placement of right double-J ureteral stent with mild residual   right hydroureteronephrosis. 9 mm stone in the proximal right ureter   similar to prior.  Similar appearance of the bladder with prominent wall thickening and   surrounding inflammatory change with multiple wall calcifications. Given   presence of calcifications, correlation for underlying Schistosomiasis   recommended.

## 2025-06-07 NOTE — PROGRESS NOTE ADULT - ASSESSMENT
34M with PMHx of ADHD, peptic ulcer disease, GERD, kidney stones, and prior cholecystectomy. Recently admitted for nephrolithiasis saw Dr Pedersen had a Stent placed and discharged. Presenting to  on 6/5/25  for similar pain, and has been having little urine output, patient states he has attempted to urinate however only a small amount comes out in dribbles. patient reports pain to the RLQ and intermittent flank pain , denies testicular pain, hematuria. Pt was suppose to see urology tomorrow in the clinic but came in due to the pain. In the ED hr was 127, bp elevated. wbc 11, hemoglobin 9.3, CT imaging showed a R sided stone 9mm and stent. Urology consulted.       Assessment/Plan:    Sepsis on admission due to complicated UTI associated with Nephrolithiasis and recent  stent placement   - CT Abdomen and Pelvis No Cont (06.04.25 @ 22:35) Interval placement of right double-J ureteral stent with mild residual   right hydroureteronephrosis. 9 mm stone in the proximal right ureter  similar to prior.  Similar appearance of the bladder with prominent wall thickening and  surrounding inflammatory change with multiple wall calcifications. Given   presence of calcifications, correlation for underlying Schistosomiasis  recommended.  - 6/6 s/p Cystoscopy, with ureteroscopy, laser lithotripsy, and ureteral stent insertion right   - blood and urine cultures, WBC 11--> 7  - urine strain  - flomax  - Tylenol and oxy for pain control  - c/w  Ceftriaxone. c/w IV fluids  - urology consulted    Bladder wall calcifications r/o Schistosomiasis   - IgG serology send - pending    ROSEMARY  due to  above , resolved  - IVF   - bladder scan   - Creatinine Trend: 0.97<--, 1.36<--, 0.74<--, 1.02<--, 1.26<--    Microcytic anemia , Iron, B12, folate deficiency   - noted anemia panel   - IV iron, folate, b12 supplementation    Vitamin D deficiency   - replace vit D    Code status: Full   Diet: regular  DVT ppx SCD    Dispo - IV fluids, IV abx, d/c planning    
35 y/o male with hx of right 9 mm ureteral stone, s/p cysto and right ureteral stent placement 5/28, return to ER for increased pain due to ureteral stent.  Recommend  - Oxybutynin 5 mg Q8H PRN bladder spasms/stent colic  - Pain meds PRN  - Continue antibiotics, adjust as per cultures/ sensitivities    - NPO MN for possible cystoscopy vs. ureteroscopy tomorrow based on clinical presentation  - Trend Creat and WBC  - Urology will follow    Case discussed with Dr. Pedersen    
34M with PMHx of ADHD, peptic ulcer disease, GERD, kidney stones, and prior cholecystectomy. Recently admitted for nephrolithiasis saw Dr Pedersen had a Stent placed and discharged. Presenting to  on 6/5/25  for similar pain, and has been having little urine output, patient states he has attempted to urinate however only a small amount comes out in dribbles. patient reports pain to the RLQ and intermittent flank pain , denies testicular pain, hematuria. Pt was suppose to see urology tomorrow in the clinic but came in due to the pain. In the ED hr was 127, bp elevated. wbc 11, hemoglobin 9.3, CT imaging showed a R sided stone 9mm and stent. Urology consulted.       Assessment/Plan:    Sepsis on admission due to complicated UTI associated with Nephrolithiasis and recent  stent placement   - CT Abdomen and Pelvis No Cont (06.04.25 @ 22:35) Interval placement of right double-J ureteral stent with mild residual   right hydroureteronephrosis. 9 mm stone in the proximal right ureter  similar to prior.  Similar appearance of the bladder with prominent wall thickening and  surrounding inflammatory change with multiple wall calcifications. Given   presence of calcifications, correlation for underlying Schistosomiasis  recommended.  - blood and urine cultures, WBC 11--> 7  - urine strain  - flomax  - Tylenol and oxy for pain control  - c/w  Ceftriaxone. c/w IV fluids  - urology consulted      Bladder wall calcifications r/o Schistosomiasis   - IgG serology send    ROSEMARY  due to  above , resolved  - IVF   - bladder scan   - Creatinine Trend: 0.97<--, 1.36<--, 0.74<--, 1.02<--, 1.26<--    Microcytic anemia , Iron, B12, folate deficiency   - noted anemia panel   - IV iron, folate, b12 supplementation    Code status: Full   Diet: regular  DVT ppx SCD    
A/P: 34M with PMHx of ADHD, peptic ulcer disease, GERD, kidney stones, and prior cholecystectomy presentgin with R sided stone 9mm and stent, now POD#1 s/p cysto with laser lithotripsy and stent placement. DOing well.     - No further urologic intervention, can follow up with Dr. Pedersen outpatient   - No barriers to discharge from urologic standpoint   - Cont abx per primary team       D/w Dr. Pedersen 
34M with PMHx of ADHD, peptic ulcer disease, GERD, kidney stones, and prior cholecystectomy. Recently admitted for nephrolithiasis saw Dr Pedersen had a Stent placed and discharged. Presenting to  on 6/5/25  for similar pain, and has been having little urine output, patient states he has attempted to urinate however only a small amount comes out in dribbles. patient reports pain to the RLQ and intermittent flank pain , denies testicular pain, hematuria. Pt was suppose to see urology tomorrow in the clinic but came in due to the pain. In the ED hr was 127, bp elevated. wbc 11, hemoglobin 9.3, CT imaging showed a R sided stone 9mm and stent. Urology consulted.       Assessment/Plan:    Sepsis on admission due to complicated UTI associated with Nephrolithiasis and recent  stent placement   - CT Abdomen and Pelvis No Cont (06.04.25 @ 22:35) Interval placement of right double-J ureteral stent with mild residual   right hydroureteronephrosis. 9 mm stone in the proximal right ureter  similar to prior.  Similar appearance of the bladder with prominent wall thickening and  surrounding inflammatory change with multiple wall calcifications. Given   presence of calcifications, correlation for underlying Schistosomiasis  recommended.  - 6/6 s/p Cystoscopy, with ureteroscopy, laser lithotripsy, and ureteral stent insertion right   - blood and urine cultures, WBC 11--> 7   - urine strain, send stone for analysis  - pain management - tylenol tid, lidocaine patch,  morphine 15 and 30 mg prn, IV tylenol now, stop oxycodone  - c/w  Ceftriaxone--> ceftin 500 bid   - c/w IV fluids  - urology consulted    Bladder wall calcifications r/o Schistosomiasis   - IgG serology send - pending    ROSEMARY  due to  above , resolved  - IVF   - bladder scan   - Creatinine Trend: 0.97<--, 1.36<--, 0.74<--, 1.02<--, 1.26<--    Microcytic anemia , Iron, B12, folate deficiency   - noted anemia panel   - IV iron, folate, b12 supplementation    Vitamin D deficiency   - replace vit D    Code status: Full   Diet: regular  DVT ppx SCD    Dispo - IV fluids, IV abx, d/c planning once pain controlled

## 2025-06-07 NOTE — DISCHARGE NOTE PROVIDER - NSDCCPCAREPLAN_GEN_ALL_CORE_FT
PRINCIPAL DISCHARGE DIAGNOSIS  Diagnosis: Right flank pain  Assessment and Plan of Treatment: due to kidney stones, drink plenty of fluids, take pain medications as prescribed, follow up with Dr. Pedersen in 1 week  complete 5 more days of antibiotics      SECONDARY DISCHARGE DIAGNOSES  Diagnosis: Anemia due to acute blood loss  Assessment and Plan of Treatment: take folic acid, vitamin B12, you recieved IV iron infusion during admission, repeat blood work with PCP within 1 week  Iron with Total Binding Capacity (06.05.25 @ 11:56)    Iron - Total Binding Capacity.: 266 ug/dL    % Saturation, Iron: 5 %    Iron Total: 14 ug/dL    Unsaturated Iron Binding Capacity: 252 ug/dL  Ferritin: 16 ng/mL (06.05.25 @ 11:56)  Folate, Serum: 3.3 ng/mL (06.05.25 @ 11:56)  Vitamin B12, Serum: 318 pg/mL (06.05.25 @ 11:56)      Diagnosis: Vitamin D deficiency  Assessment and Plan of Treatment: take vitamin D daily  Vitamin D, 25-Hydroxy: 6.6: Test Repeated  Vitamin D concentrations at 20 ng/mL are adequate for bone health in  97.5% of the healthy population. Pediatric and geriatric individuals may  require higher concentrations. ng/mL (06.06.25 @ 06:01)      Diagnosis: Bladder calcified  Assessment and Plan of Treatment: your blood test for Schistosomiasis serology pending at time of discharge if abnormal doctor will call you    Diagnosis: Acute renal failure  Assessment and Plan of Treatment: resolved, drink plenty of fluids, follow up with PCP to repeat your renal functions tests in 1 week     PRINCIPAL DISCHARGE DIAGNOSIS  Diagnosis: Right flank pain  Assessment and Plan of Treatment: due to kidney stones, drink plenty of fluids, take pain medications as prescribed, follow up with Dr. Pedersen in 1 week  complete 5 more days of antibiotics      SECONDARY DISCHARGE DIAGNOSES  Diagnosis: Anemia due to acute blood loss  Assessment and Plan of Treatment: take iron , folic acid, vitamin B12, you recieved IV iron infusion during admission, repeat blood work with PCP within 1 week  Iron with Total Binding Capacity (06.05.25 @ 11:56)    Iron - Total Binding Capacity.: 266 ug/dL    % Saturation, Iron: 5 %    Iron Total: 14 ug/dL    Unsaturated Iron Binding Capacity: 252 ug/dL  Ferritin: 16 ng/mL (06.05.25 @ 11:56)  Folate, Serum: 3.3 ng/mL (06.05.25 @ 11:56)  Vitamin B12, Serum: 318 pg/mL (06.05.25 @ 11:56)      Diagnosis: Vitamin D deficiency  Assessment and Plan of Treatment: take vitamin D daily  Vitamin D, 25-Hydroxy: 6.6: Test Repeated  Vitamin D concentrations at 20 ng/mL are adequate for bone health in  97.5% of the healthy population. Pediatric and geriatric individuals may  require higher concentrations. ng/mL (06.06.25 @ 06:01)      Diagnosis: Bladder calcified  Assessment and Plan of Treatment: your blood test for Schistosomiasis serology pending at time of discharge if abnormal doctor will call you    Diagnosis: Acute renal failure  Assessment and Plan of Treatment: resolved, drink plenty of fluids, follow up with PCP to repeat your renal functions tests in 1 week

## 2025-06-07 NOTE — DISCHARGE NOTE PROVIDER - CARE PROVIDER_API CALL
Isha Orosco  [facility]  [address] [place] [id number]  Phone: [phone]  Fax: [phone]  Follow Up Time: 1 week    Cornell Pedersen  Urology  [address] [place] [id number]  Phone: [phone]  Fax: [phone]  Follow Up Time: 1 week

## 2025-06-07 NOTE — DISCHARGE NOTE PROVIDER - HOSPITAL COURSE
Chief complain : right sided flank pain     HPI:  34M with PMHx of ADHD, peptic ulcer disease, GERD, kidney stones, and prior cholecystectomy. Recently admitted for nephrolithiasis saw Dr Pedersen had a Stent placed and discharged. Presenting to  for similar pain, and has been having little urine output, patient states he has attempted to urinate however only a small amount comes out in dribbles. patient reports pain to the RLQ and intermittent flank pain , denies testicular pain, hematuria. Pt was suppose to see urology tomorrow in the clinic but came in due to the pain. In the ED hr was 127, bp elevated. wbc 11, hemoglobin 9.3, CT imaging showed a R sided stone 9mm and stent. Urology consulted.     6/5 -   Patient seen and examined at bedside earlier today, feels better, denies cp, dyspnea, abdominal pain controlled with medications, afebrile, tolerating diet  6/6 - pt seen and examined, + right sided flank pain persist, denies cp, dyspnea, cough, awaiting for procedure, afebrile  6/7 - pain is improved, tolerating diet, denies cp, dyspnea, cough, pain on urination improving, plan for discharge discussed   Review of system- Rest of the review of system are negative except mentioned in HPI  Vital sings reviewed for last 24 h  T(C): 36.7 (06-07-25 @ 07:42), Max: 36.7 (06-07-25 @ 00:00)  T(F): 98.1 (06-07-25 @ 07:42), Max: 98.1 (06-07-25 @ 00:00)  HR: 88 (06-07-25 @ 07:42) (85 - 96)  BP: 136/89 (06-07-25 @ 07:42) (131/90 - 136/89)  RR: 18 (06-07-25 @ 07:42) (12 - 18)  SpO2: 100% (06-07-25 @ 07:42) (98% - 100%)  Physical exam:   General : NAD, appear to be of stated age , well groomed   NERVOUS SYSTEM:  Alert & Oriented X3, non- focal exam, Motor Strength 5/5 B/L upper and lower extremities; DTRs 2+ intact and symmetric  HEAD:  Atraumatic, Normocephalic  EYES: EOMI, PERRLA, conjunctiva and sclera clear  HEENT: Moist mucous membranes, Supple neck , No JVD  CHEST: Clear to auscultation bilaterally; No rales, no rhonchi, no wheezing  HEART: Regular rate and rhythm; No murmurs, no rubs or gallops  ABDOMEN: Soft, RLQ tenderness, , Non-distended; Bowel sounds present, no guarding , no peritoneal irritation   GENITOURINARY- Voiding, no suprapubic tenderness  EXTREMITIES:  2+ Peripheral Pulses, No clubbing, cyanosis,   edema  MUSCULOSKELETAL:- No muscle tenderness, Muscle tone normal, No joint tenderness, no Joint swelling,  Joint ROM –normal   SKIN-no rash, no lesion    Labs radiologic and other test : all reviewed and interpreted   Sepsis on admission due to complicated UTI associated with Nephrolithiasis and recent  stent placement   - CT Abdomen and Pelvis No Cont (06.04.25 @ 22:35) Interval placement of right double-J ureteral stent with mild residual   right hydroureteronephrosis. 9 mm stone in the proximal right ureter  similar to prior.  Similar appearance of the bladder with prominent wall thickening and  surrounding inflammatory change with multiple wall calcifications. Given   presence of calcifications, correlation for underlying Schistosomiasis  recommended.  - 6/6 s/p Cystoscopy, with ureteroscopy, laser lithotripsy, and ureteral stent insertion right   - blood and urine cultures, WBC 11--> 7--> 9   - urine strain send stone for analysis,  flomax  - Tylenol and oxy for pain control  - c/w  Ceftriaxone. c/w IV fluids--> po ceftin for 5 days  - urology consulted -o/p follow up   - send stone for analysis  Bladder wall calcifications r/o Schistosomiasis   - IgG serology send - pending  ROSEMARY  due to  above , resolved  - IVF ,  bladder scan   - Creatinine Trend: 0.97<--, 1.36<--, 0.74<--, 1.02<--, 1.26<--  Microcytic anemia , Iron, B12, folate deficiency   - noted anemia panel   - s/p IV iron, folate, b12 supplementation  Vitamin D deficiency   - replace vit D  Final diagnosis, treatment plan, and follow-up recommendations were discussed and explained to the patient.   The patient was given an opportunity to ask questions concerning the diagnosis and treatment plan.   The patient acknowledged understanding of the diagnosis, treatment, and follow-up recommendations.   The patient was advised to seek urgent care upon discharge if worsening symptoms develop prior to scheduled follow-up.   Time spent on discharge included time with the patient, and also coordinating discharge care as outlined below.  Discharge note faxed to PCP with my contact information to call me back   PCP  Dr. Orosco  Total time spent: 50 min           Chief complain : right sided flank pain     HPI:  34M with PMHx of ADHD, peptic ulcer disease, GERD, kidney stones, and prior cholecystectomy. Recently admitted for nephrolithiasis saw Dr Pedersen had a Stent placed and discharged. Presenting to  for similar pain, and has been having little urine output, patient states he has attempted to urinate however only a small amount comes out in dribbles. patient reports pain to the RLQ and intermittent flank pain , denies testicular pain, hematuria. Pt was suppose to see urology tomorrow in the clinic but came in due to the pain. In the ED hr was 127, bp elevated. wbc 11, hemoglobin 9.3, CT imaging showed a R sided stone 9mm and stent. Urology consulted.     6/5 -   Patient seen and examined at bedside earlier today, feels better, denies cp, dyspnea, abdominal pain controlled with medications, afebrile, tolerating diet  6/6 - pt seen and examined, + right sided flank pain persist, denies cp, dyspnea, cough, awaiting for procedure, afebrile  6/7 - pain is improved, tolerating diet, denies cp, dyspnea, cough, pain on urination improving, plan for discharge discussed   6/8 - pain better controlled on morphine 30 mg , denies cp, dyspnea, RLQ pain better controlled, passing clots while urinating   Review of system- Rest of the review of system are negative except mentioned in HPI  Vital sings reviewed for last 24 h  T(C): 36.9 (06-08-25 @ 07:40), Max: 36.9 (06-08-25 @ 07:40)  T(F): 98.4 (06-08-25 @ 07:40), Max: 98.4 (06-08-25 @ 07:40)  HR: 86 (06-08-25 @ 07:40) (82 - 118)  BP: 123/86 (06-08-25 @ 07:40) (120/79 - 131/92)  RR: 18 (06-08-25 @ 07:40) (18 - 18)  SpO2: 99% (06-08-25 @ 07:40) (98% - 99%)    Physical exam:   General : NAD, appear to be of stated age , well groomed   NERVOUS SYSTEM:  Alert & Oriented X3, non- focal exam, Motor Strength 5/5 B/L upper and lower extremities; DTRs 2+ intact and symmetric  HEAD:  Atraumatic, Normocephalic  EYES: EOMI, PERRLA, conjunctiva and sclera clear  HEENT: Moist mucous membranes, Supple neck , No JVD  CHEST: Clear to auscultation bilaterally; No rales, no rhonchi, no wheezing  HEART: Regular rate and rhythm; No murmurs, no rubs or gallops  ABDOMEN: Soft, RLQ tenderness, , Non-distended; Bowel sounds present, no guarding , no peritoneal irritation   GENITOURINARY- Voiding, no suprapubic tenderness  EXTREMITIES:  2+ Peripheral Pulses, No clubbing, cyanosis,   edema  MUSCULOSKELETAL:- No muscle tenderness, Muscle tone normal, No joint tenderness, no Joint swelling,  Joint ROM –normal   SKIN-no rash, no lesion    Labs radiologic and other test : all reviewed and interpreted     Sepsis on admission due to complicated UTI associated with Nephrolithiasis and recent  stent placement   - CT Abdomen and Pelvis No Cont (06.04.25 @ 22:35) Interval placement of right double-J ureteral stent with mild residual   right hydroureteronephrosis. 9 mm stone in the proximal right ureter  similar to prior.  Similar appearance of the bladder with prominent wall thickening and  surrounding inflammatory change with multiple wall calcifications. Given   presence of calcifications, correlation for underlying Schistosomiasis  recommended.  - 6/6 s/p Cystoscopy, with ureteroscopy, laser lithotripsy, and ureteral stent insertion right   - blood and urine cultures, WBC 11--> 7--> 9   - urine strain send stone for analysis,  flomax  - pain control - morphine, tylenol, ketorolac prn  - c/w  Ceftriaxone. c/w IV fluids--> po ceftin for 5 days  - urology consulted -o/p follow up   - send stone for analysis  Bladder wall calcifications r/o Schistosomiasis   - IgG serology send - pending  ROSEMARY  due to  above , resolved  - IVF ,  bladder scan   - Creatinine Trend: 0.97<--, 1.36<--, 0.74<--, 1.02<--, 1.26<--  Microcytic anemia , Iron, B12, folate deficiency   - noted anemia panel , PO iron  - s/p IV iron, folate, b12 supplementation  Vitamin D deficiency   - replace vit D  Final diagnosis, treatment plan, and follow-up recommendations were discussed and explained to the patient.   The patient was given an opportunity to ask questions concerning the diagnosis and treatment plan.   The patient acknowledged understanding of the diagnosis, treatment, and follow-up recommendations.   The patient was advised to seek urgent care upon discharge if worsening symptoms develop prior to scheduled follow-up.   Time spent on discharge included time with the patient, and also coordinating discharge care as outlined below.  Discharge note faxed to PCP with my contact information to call me back   PCP  Dr. Orosco  Total time spent: 50 min

## 2025-06-07 NOTE — DISCHARGE NOTE PROVIDER - NSDCMRMEDTOKEN_GEN_ALL_CORE_FT
Adderall 20 mg oral tablet: 1 tab(s) orally once a day at 2pm as needed  Adderall 30 mg oral tablet: 1 tab(s) orally once a day as needed- pt only takes if going to work  cefuroxime 500 mg oral tablet: 1 tab(s) orally 2 times a day  cholecalciferol 50 mcg (2000 intl units) oral tablet: 1 tab(s) orally once a day (at bedtime)  cyanocobalamin 500 mcg oral tablet, chewable: 1 tab(s) chewed once a day  folic acid 1 mg oral tablet: 1 tab(s) orally once a day  ketorolac 10 mg oral tablet: 1 tab(s) orally every 8 hours as needed for  moderate pain  Multiple Vitamins oral tablet: 1 tab(s) orally once a day  Narcan 4 mg/0.1 mL nasal spray: 4 milligram(s) intranasally every 3 minutes spray in nostril  in case of opioids overdose , you can repeat in 3 minutes in alternate nostril and seek emergency medical assistance as soon as possible  oxyBUTYnin 5 mg oral tablet: 1 tab(s) orally 3 times a day as needed for urinary urgency  oxyCODONE 5 mg oral tablet: 1 tab(s) orally every 6 hours as needed for  severe pain MDD: 20 mg  phenazopyridine 200 mg oral tablet: 1 tab(s) orally 3 times a day as needed for dysuria  tamsulosin 0.4 mg oral capsule: 2 cap(s) orally once a day (at bedtime)  Xanax 1 mg oral tablet: 1 tab(s) orally once a day (at bedtime) ***pt can take up to 3 times daily if needed MDD:3***   Adderall 20 mg oral tablet: 1 tab(s) orally once a day at 2pm as needed  Adderall 30 mg oral tablet: 1 tab(s) orally once a day as needed- pt only takes if going to work  cefuroxime 500 mg oral tablet: 1 tab(s) orally 2 times a day  cholecalciferol 50 mcg (2000 intl units) oral tablet: 1 tab(s) orally once a day (at bedtime)  cyanocobalamin 500 mcg oral tablet, chewable: 1 tab(s) chewed once a day  folic acid 1 mg oral tablet: 1 tab(s) orally once a day  ketorolac 10 mg oral tablet: 1 tab(s) orally every 8 hours as needed for  moderate pain  morphine 15 mg oral tablet: 1 tab(s) orally every 6 hours as needed for Severe Pain (7 - 10) MDD: 60  Multiple Vitamins oral tablet: 1 tab(s) orally once a day  Narcan 4 mg/0.1 mL nasal spray: 4 milligram(s) intranasally every 3 minutes spray in nostril  in case of opioids overdose , you can repeat in 3 minutes in alternate nostril and seek emergency medical assistance as soon as possible  oxyBUTYnin 5 mg oral tablet: 1 tab(s) orally 3 times a day as needed for urinary urgency  phenazopyridine 200 mg oral tablet: 1 tab(s) orally 3 times a day as needed for dysuria  tamsulosin 0.4 mg oral capsule: 2 cap(s) orally once a day (at bedtime)  Xanax 1 mg oral tablet: 1 tab(s) orally once a day (at bedtime) ***pt can take up to 3 times daily if needed MDD:3***   acetaminophen 325 mg oral tablet: 2 tab(s) orally every 8 hours as needed for  mild pain  Adderall 20 mg oral tablet: 1 tab(s) orally once a day at 2pm as needed  Adderall 30 mg oral tablet: 1 tab(s) orally once a day as needed- pt only takes if going to work  cefuroxime 500 mg oral tablet: 1 tab(s) orally 2 times a day  cholecalciferol 50 mcg (2000 intl units) oral tablet: 1 tab(s) orally once a day (at bedtime)  cyanocobalamin 500 mcg oral tablet, chewable: 1 tab(s) chewed once a day  ferrous fumarate 325 mg (106 mg elemental iron) oral tablet: 1 tab(s) orally once a day  folic acid 1 mg oral tablet: 1 tab(s) orally once a day  ketorolac 10 mg oral tablet: 1 tab(s) orally every 8 hours as needed for  moderate pain  morphine 30 mg oral tablet: 1 tab(s) orally every 6 hours as needed for Severe Pain (7 - 10) MDD: 120  Multiple Vitamins oral tablet: 1 tab(s) orally once a day  Narcan 4 mg/0.1 mL nasal spray: 4 milligram(s) intranasally every 3 minutes spray in nostril  in case of opioids overdose , you can repeat in 3 minutes in alternate nostril and seek emergency medical assistance as soon as possible  oxyBUTYnin 5 mg oral tablet: 1 tab(s) orally 3 times a day as needed for urinary urgency  phenazopyridine 200 mg oral tablet: 1 tab(s) orally 3 times a day as needed for dysuria  tamsulosin 0.4 mg oral capsule: 2 cap(s) orally once a day (at bedtime)  Xanax 1 mg oral tablet: 1 tab(s) orally once a day (at bedtime) ***pt can take up to 3 times daily if needed MDD:3***   acetaminophen 325 mg oral tablet: 2 tab(s) orally every 8 hours as needed for  mild pain  Adderall 20 mg oral tablet: 1 tab(s) orally once a day at 2pm as needed  Adderall 30 mg oral tablet: 1 tab(s) orally once a day as needed- pt only takes if going to work  cefuroxime 500 mg oral tablet: 1 tab(s) orally 2 times a day  cholecalciferol 50 mcg (2000 intl units) oral tablet: 1 tab(s) orally once a day (at bedtime)  cyanocobalamin 500 mcg oral tablet, chewable: 1 tab(s) chewed once a day  ferrous fumarate 325 mg (106 mg elemental iron) oral tablet: 1 tab(s) orally once a day  folic acid 1 mg oral tablet: 1 tab(s) orally once a day  ketorolac 10 mg oral tablet: 1 tab(s) orally every 8 hours as needed for  moderate pain  morphine 15 mg oral tablet: 2 tab(s) orally every 6 hours as needed for Severe Pain (7 - 10) MDD: 120  Multiple Vitamins oral tablet: 1 tab(s) orally once a day  Narcan 4 mg/0.1 mL nasal spray: 4 milligram(s) intranasally every 3 minutes spray in nostril  in case of opioids overdose , you can repeat in 3 minutes in alternate nostril and seek emergency medical assistance as soon as possible  oxyBUTYnin 5 mg oral tablet: 1 tab(s) orally 3 times a day as needed for urinary urgency  phenazopyridine 200 mg oral tablet: 1 tab(s) orally 3 times a day as needed for dysuria  tamsulosin 0.4 mg oral capsule: 2 cap(s) orally once a day (at bedtime)  Xanax 1 mg oral tablet: 1 tab(s) orally once a day (at bedtime) ***pt can take up to 3 times daily if needed MDD:3***

## 2025-06-07 NOTE — PROGRESS NOTE ADULT - SUBJECTIVE AND OBJECTIVE BOX
Patient seen at bedside. Reports he continues to have right stent colic, right flank pain radiating to RLQ. Denies any fevers, chills, n/v/dysuria.    PE  VITALS  T(C): 36.4 (06-05-25 @ 07:56), Max: 37.1 (06-05-25 @ 02:52)  HR: 79 (06-05-25 @ 07:56) (79 - 127)  BP: 138/96 (06-05-25 @ 07:56) (138/96 - 152/97)  RR: 20 (06-05-25 @ 07:56) (18 - 20)  SpO2: 100% (06-05-25 @ 07:56) (95% - 100%)               Lung    : No resp distress  Abdo:   : Soft, Non tender, No guarding, No distension   Back    : No CVAT b/l  Genitalia Male: No Collier   Neuro   : A&Ox3    LABS                        7.7    7.44  )-----------( 398      ( 05 Jun 2025 06:35 )             24.8   06-05    140  |  112[H]  |  16  ----------------------------<  114[H]  3.7   |  25  |  0.97    Ca    8.1[L]      05 Jun 2025 06:35    TPro  7.9  /  Alb  3.7  /  TBili  0.2  /  DBili  x   /  AST  25  /  ALT  46  /  AlkPhos  98  06-04  
Subjective:  Chief complain : right sided flank pain     HPI:  34M with PMHx of ADHD, peptic ulcer disease, GERD, kidney stones, and prior cholecystectomy. Recently admitted for nephrolithiasis saw Dr Pedersen had a Stent placed and discharged. Presenting to  for similar pain, and has been having little urine output, patient states he has attempted to urinate however only a small amount comes out in dribbles. patient reports pain to the RLQ and intermittent flank pain , denies testicular pain, hematuria. Pt was suppose to see urology tomorrow in the clinic but came in due to the pain. In the ED hr was 127, bp elevated. wbc 11, hemoglobin 9.3, CT imaging showed a R sided stone 9mm and stent. Urology consulted.      -   Patient seen and examined at bedside earlier today, feels better, denies cp, dyspnea, abdominal pain controlled with medications, afebrile, tolerating diet    Review of system- Rest of the review of system are negative except mentioned in HPI     Vital sings reviewed for last 24 h  T(C): 36.8 (25 @ 16:06), Max: 37.1 (25 @ 02:52)  HR: 68 (25 @ 16:06) (68 - 127)  BP: 127/83 (-25 @ 16:06) (127/83 - 152/97)  RR: 18 (25 @ 16:06) (18 - 20)  SpO2: 100% (25 @ 16:06) (95% - 100%)  Wt(kg): --  Daily Height in cm: 175 (2025 21:13)    Daily Weight in k.9 (2025 03:20)  CAPILLARY BLOOD GLUCOSE      Physical exam:   General : NAD, appear to be of stated age , well groomed   NERVOUS SYSTEM:  Alert & Oriented X3, non- focal exam, Motor Strength 5/5 B/L upper and lower extremities; DTRs 2+ intact and symmetric  HEAD:  Atraumatic, Normocephalic  EYES: EOMI, PERRLA, conjunctiva and sclera clear  HEENT: Moist mucous membranes, Supple neck , No JVD  CHEST: Clear to auscultation bilaterally; No rales, no rhonchi, no wheezing  HEART: Regular rate and rhythm; No murmurs, no rubs or gallops  ABDOMEN: Soft, RLQ tenderness, , Non-distended; Bowel sounds present, no guarding , no peritoneal irritation   GENITOURINARY- Voiding, no suprapubic tenderness  EXTREMITIES:  2+ Peripheral Pulses, No clubbing, cyanosis,   edema  MUSCULOSKELETAL:- No muscle tenderness, Muscle tone normal, No joint tenderness, no Joint swelling,  Joint ROM –normal   SKIN-no rash, no lesion    Labs radiologic and other test : all reviewed and interpreted :                         7.7    7.44  )-----------( 398      ( 2025 06:35 )             24.8     06-    140  |  112[H]  |  16  ----------------------------<  114[H]  3.7   |  25  |  0.97    Ca    8.1[L]      2025 06:35    TPro  7.9  /  Alb  3.7  /  TBili  0.2  /  DBili  x   /  AST  25  /  ALT  46  /  AlkPhos  98  06-04       CT Abdomen and Pelvis No Cont (25 @ 22:35) >  IMPRESSION:  Interval placement of right double-J ureteral stent with mild residual   right hydroureteronephrosis. 9 mm stone in the proximal right ureter   similar to prior.    Similar appearance of the bladder with prominent wall thickening and   surrounding inflammatory change with multiple wall calcifications. Given   presence of calcifications, correlation for underlying Schistosomiasis   recommended.    < end of copied text >            RECENT CULTURES:  Urine Microscopic-Add On (NC) (25 @ 21:59)    White Blood Cell - Urine: 104 /HPF   Red Blood Cell - Urine: 750 /HPF   Bacteria: Negative /HPF   Cast: present /LPF   Granular Cast: 2   Epithelial Cells: 1 /HPF    Culture - Urine (25 @ 01:31)    Specimen Source: Urine None   Culture Results:   <10,000 CFU/mL Normal Urogenital Marie        Cardiac testing : reviewed   EKG     Procedures :     Devices:     Current medications:  acetaminophen     Tablet .. 650 milliGRAM(s) Oral every 6 hours PRN  ALPRAZolam 1 milliGRAM(s) Oral at bedtime PRN  aluminum hydroxide/magnesium hydroxide/simethicone Suspension 30 milliLiter(s) Oral every 4 hours PRN  cefTRIAXone Injectable. 1000 milliGRAM(s) IV Push every 24 hours  HYDROmorphone  Injectable 0.5 milliGRAM(s) IV Push every 4 hours PRN  melatonin 3 milliGRAM(s) Oral at bedtime PRN  multivitamin 1 Tablet(s) Oral daily  ondansetron Injectable 4 milliGRAM(s) IV Push every 8 hours PRN  oxybutynin 5 milliGRAM(s) Oral three times a day PRN  oxyCODONE    IR 5 milliGRAM(s) Oral every 4 hours PRN  phenazopyridine 200 milliGRAM(s) Oral three times a day PRN  tamsulosin 0.8 milliGRAM(s) Oral at bedtime            
Postop day #1     PAtient seen and examined. Doing well. Passed some clots in urine postop cysto but now clear. On Pyridium, some bladder discomfort. Ambulating. Tolerating diet. WAnts to go home.     Vital Signs Last 24 Hrs  T(C): 36.7 (07 Jun 2025 07:42), Max: 36.7 (07 Jun 2025 00:00)  T(F): 98.1 (07 Jun 2025 07:42), Max: 98.1 (07 Jun 2025 00:00)  HR: 88 (07 Jun 2025 07:42) (85 - 96)  BP: 136/89 (07 Jun 2025 07:42) (131/90 - 136/89)  BP(mean): 102 (06 Jun 2025 17:59) (102 - 102)  RR: 18 (07 Jun 2025 07:42) (12 - 18)  SpO2: 100% (07 Jun 2025 07:42) (98% - 100%)    Parameters below as of 07 Jun 2025 07:42  Patient On (Oxygen Delivery Method): room air    CBC:            9.1    9.32  )-----------( 502      ( 06-07-25 @ 06:35 )             28.9     Chem:         ( 06-07-25 @ 06:35 )    135  |  105  |  14  ----------------------------<  101[H]  4.0   |  26  |  0.92        Liver Functions: ( 06-07-25 @ 06:35 )  Alb: 3.2 g/dL / Pro: 7.1 gm/dL / ALK PHOS: 104 U/L / ALT: 35 U/L / AST: 15 U/L / GGT: x          MEDICATIONS  (STANDING):  cefTRIAXone Injectable. 1000 milliGRAM(s) IV Push every 24 hours  cholecalciferol 2000 Unit(s) Oral at bedtime  cyanocobalamin 500 MICROGram(s) Oral daily  folic acid 1 milliGRAM(s) Oral daily  iron sucrose IVPB 100 milliGRAM(s) IV Intermittent every 24 hours  multivitamin 1 Tablet(s) Oral daily  tamsulosin 0.8 milliGRAM(s) Oral at bedtime    MEDICATIONS  (PRN):  acetaminophen     Tablet .. 650 milliGRAM(s) Oral every 6 hours PRN Temp greater or equal to 38C (100.4F), Mild Pain (1 - 3)  ALPRAZolam 1 milliGRAM(s) Oral at bedtime PRN anxiety  aluminum hydroxide/magnesium hydroxide/simethicone Suspension 30 milliLiter(s) Oral every 4 hours PRN Dyspepsia  HYDROmorphone  Injectable 0.5 milliGRAM(s) IV Push every 4 hours PRN breakthrough pain  melatonin 3 milliGRAM(s) Oral at bedtime PRN Insomnia  ondansetron Injectable 4 milliGRAM(s) IV Push every 8 hours PRN Nausea and/or Vomiting  oxybutynin 5 milliGRAM(s) Oral three times a day PRN urinary urgency  oxyCODONE    IR 5 milliGRAM(s) Oral every 4 hours PRN Severe Pain (7 - 10)  phenazopyridine 200 milliGRAM(s) Oral three times a day PRN dysuria    Physical exam:   Sitting on edge of bed  Comfortable appearing   S1, S2   Abdomen soft   Mild discomfort below umbilicus   Skin warm and dry   
Subjective:  Chief complain : right sided flank pain     HPI:  34M with PMHx of ADHD, peptic ulcer disease, GERD, kidney stones, and prior cholecystectomy. Recently admitted for nephrolithiasis saw Dr Pedersen had a Stent placed and discharged. Presenting to  for similar pain, and has been having little urine output, patient states he has attempted to urinate however only a small amount comes out in dribbles. patient reports pain to the RLQ and intermittent flank pain , denies testicular pain, hematuria. Pt was suppose to see urology tomorrow in the clinic but came in due to the pain. In the ED hr was 127, bp elevated. wbc 11, hemoglobin 9.3, CT imaging showed a R sided stone 9mm and stent. Urology consulted.     6/5 -   Patient seen and examined at bedside earlier today, feels better, denies cp, dyspnea, abdominal pain controlled with medications, afebrile, tolerating diet  6/6 - pt seen and examined, + right sided flank pain persist, denies cp, dyspnea, cough, awaiting for procedure, afebrile    Review of system- Rest of the review of system are negative except mentioned in HPI    Vital sings reviewed for last 24 h  T(C): 36.6 (06-06-25 @ 17:59), Max: 36.9 (06-05-25 @ 21:01)  T(F): 97.9 (06-06-25 @ 17:59), Max: 98.5 (06-05-25 @ 21:01)  HR: 85 (06-06-25 @ 17:59) (73 - 96)  BP: 131/90 (06-06-25 @ 17:59) (122/81 - 142/90)  RR: 18 (06-06-25 @ 17:59) (12 - 18)  SpO2: 99% (06-06-25 @ 17:59) (98% - 100%)      Physical exam:   General : NAD, appear to be of stated age , well groomed   NERVOUS SYSTEM:  Alert & Oriented X3, non- focal exam, Motor Strength 5/5 B/L upper and lower extremities; DTRs 2+ intact and symmetric  HEAD:  Atraumatic, Normocephalic  EYES: EOMI, PERRLA, conjunctiva and sclera clear  HEENT: Moist mucous membranes, Supple neck , No JVD  CHEST: Clear to auscultation bilaterally; No rales, no rhonchi, no wheezing  HEART: Regular rate and rhythm; No murmurs, no rubs or gallops  ABDOMEN: Soft, RLQ tenderness, , Non-distended; Bowel sounds present, no guarding , no peritoneal irritation   GENITOURINARY- Voiding, no suprapubic tenderness  EXTREMITIES:  2+ Peripheral Pulses, No clubbing, cyanosis,   edema  MUSCULOSKELETAL:- No muscle tenderness, Muscle tone normal, No joint tenderness, no Joint swelling,  Joint ROM –normal   SKIN-no rash, no lesion    Labs radiologic and other test : all reviewed and interpreted :                         8.6    8.02  )-----------( 450      ( 06 Jun 2025 06:01 )             27.5     06-06    135  |  108  |  12  ----------------------------<  95  3.7   |  24  |  0.87    Ca    8.7      06 Jun 2025 06:01  Phos  3.0     06-06  Mg     2.0     06-06    TPro  6.9  /  Alb  3.2[L]  /  TBili  0.2  /  DBili  x   /  AST  15  /  ALT  39  /  AlkPhos  96  06-06        LIVER FUNCTIONS - ( 06 Jun 2025 06:01 )  Alb: 3.2 g/dL / Pro: 6.9 gm/dL / ALK PHOS: 96 U/L / ALT: 39 U/L / AST: 15 U/L / GGT: x                      CT Abdomen and Pelvis No Cont (06.04.25 @ 22:35) >  IMPRESSION:  Interval placement of right double-J ureteral stent with mild residual   right hydroureteronephrosis. 9 mm stone in the proximal right ureter   similar to prior.    Similar appearance of the bladder with prominent wall thickening and   surrounding inflammatory change with multiple wall calcifications. Given   presence of calcifications, correlation for underlying Schistosomiasis   recommended.    < end of copied text >            RECENT CULTURES:  Urine Microscopic-Add On (NC) (06.04.25 @ 21:59)    White Blood Cell - Urine: 104 /HPF   Red Blood Cell - Urine: 750 /HPF   Bacteria: Negative /HPF   Cast: present /LPF   Granular Cast: 2   Epithelial Cells: 1 /HPF    Culture - Urine (05.28.25 @ 01:31)    Specimen Source: Urine None   Culture Results:   <10,000 CFU/mL Normal Urogenital Marie        Cardiac testing : reviewed   EKG     Procedures :     Devices:     Current medications:  acetaminophen     Tablet .. 650 milliGRAM(s) Oral every 6 hours PRN  ALPRAZolam 1 milliGRAM(s) Oral at bedtime PRN  aluminum hydroxide/magnesium hydroxide/simethicone Suspension 30 milliLiter(s) Oral every 4 hours PRN  cefTRIAXone Injectable. 1000 milliGRAM(s) IV Push every 24 hours  HYDROmorphone  Injectable 0.5 milliGRAM(s) IV Push every 4 hours PRN  melatonin 3 milliGRAM(s) Oral at bedtime PRN  multivitamin 1 Tablet(s) Oral daily  ondansetron Injectable 4 milliGRAM(s) IV Push every 8 hours PRN  oxybutynin 5 milliGRAM(s) Oral three times a day PRN  oxyCODONE    IR 5 milliGRAM(s) Oral every 4 hours PRN  phenazopyridine 200 milliGRAM(s) Oral three times a day PRN  tamsulosin 0.8 milliGRAM(s) Oral at bedtime            
Subjective:  Chief complain : right sided flank pain     HPI:  34M with PMHx of ADHD, peptic ulcer disease, GERD, kidney stones, and prior cholecystectomy. Recently admitted for nephrolithiasis saw Dr Pedersen had a Stent placed and discharged. Presenting to  for similar pain, and has been having little urine output, patient states he has attempted to urinate however only a small amount comes out in dribbles. patient reports pain to the RLQ and intermittent flank pain , denies testicular pain, hematuria. Pt was suppose to see urology tomorrow in the clinic but came in due to the pain. In the ED hr was 127, bp elevated. wbc 11, hemoglobin 9.3, CT imaging showed a R sided stone 9mm and stent. Urology consulted.     6/5 -   Patient seen and examined at bedside earlier today, feels better, denies cp, dyspnea, abdominal pain controlled with medications, afebrile, tolerating diet  6/6 - pt seen and examined, + right sided flank pain persist, denies cp, dyspnea, cough, awaiting for procedure, afebrile  6/7 - pain not controlled, passing the clots, denies cp, dyspnea, + right sided abdominal pain 7/10, tolerating diet, + BMs, + dysuria but improved, afebrile, plan discussed    Review of system- Rest of the review of system are negative except mentioned in HPI    Vital sings reviewed for last 24 h  T(C): 36.7 (06-07-25 @ 16:00), Max: 36.7 (06-07-25 @ 00:00)  T(F): 98.1 (06-07-25 @ 16:00), Max: 98.1 (06-07-25 @ 00:00)  HR: 118 (06-07-25 @ 16:00) (85 - 118)  BP: 131/92 (06-07-25 @ 16:00) (131/90 - 136/89)  RR: 18 (06-07-25 @ 16:00) (12 - 18)  SpO2: 98% (06-07-25 @ 16:00) (98% - 100%)  Wt(kg): --  Daily     Daily   CAPILLARY BLOOD GLUCOSE            Physical exam:   General : NAD, appear to be of stated age , well groomed   NERVOUS SYSTEM:  Alert & Oriented X3, non- focal exam, Motor Strength 5/5 B/L upper and lower extremities; DTRs 2+ intact and symmetric  HEAD:  Atraumatic, Normocephalic  EYES: EOMI, PERRLA, conjunctiva and sclera clear  HEENT: Moist mucous membranes, Supple neck , No JVD  CHEST: Clear to auscultation bilaterally; No rales, no rhonchi, no wheezing  HEART: Regular rate and rhythm; No murmurs, no rubs or gallops  ABDOMEN: Soft, RLQ tenderness, , Non-distended; Bowel sounds present, no guarding , no peritoneal irritation   GENITOURINARY- Voiding, no suprapubic tenderness  EXTREMITIES:  2+ Peripheral Pulses, No clubbing, cyanosis,   edema  MUSCULOSKELETAL:- No muscle tenderness, Muscle tone normal, No joint tenderness, no Joint swelling,  Joint ROM –normal   SKIN-no rash, no lesion    Labs radiologic and other test : all reviewed and interpreted :                           9.1    9.32  )-----------( 502      ( 07 Jun 2025 06:35 )             28.9     06-07    135  |  105  |  14  ----------------------------<  101[H]  4.0   |  26  |  0.92    Ca    9.0      07 Jun 2025 06:35  Phos  3.2     06-07  Mg     2.1     06-07    TPro  7.1  /  Alb  3.2[L]  /  TBili  0.1[L]  /  DBili  <0.1  /  AST  15  /  ALT  35  /  AlkPhos  104  06-07        LIVER FUNCTIONS - ( 07 Jun 2025 06:35 )  Alb: 3.2 g/dL / Pro: 7.1 gm/dL / ALK PHOS: 104 U/L / ALT: 35 U/L / AST: 15 U/L / GGT: x                     Culture - Blood (06.05.25 @ 00:44)    Specimen Source: Blood None   Culture Results:   No growth at 48 Hours             CT Abdomen and Pelvis No Cont (06.04.25 @ 22:35) >  IMPRESSION:  Interval placement of right double-J ureteral stent with mild residual   right hydroureteronephrosis. 9 mm stone in the proximal right ureter   similar to prior.    Similar appearance of the bladder with prominent wall thickening and   surrounding inflammatory change with multiple wall calcifications. Given   presence of calcifications, correlation for underlying Schistosomiasis   recommended.    < end of copied text >            RECENT CULTURES:  Urine Microscopic-Add On (NC) (06.04.25 @ 21:59)    White Blood Cell - Urine: 104 /HPF   Red Blood Cell - Urine: 750 /HPF   Bacteria: Negative /HPF   Cast: present /LPF   Granular Cast: 2   Epithelial Cells: 1 /HPF    Culture - Urine (05.28.25 @ 01:31)    Specimen Source: Urine None   Culture Results:   <10,000 CFU/mL Normal Urogenital Marie        Cardiac testing : reviewed   EKG     Procedures :     Devices:     Current medications:  acetaminophen     Tablet .. 650 milliGRAM(s) Oral every 6 hours PRN  ALPRAZolam 1 milliGRAM(s) Oral at bedtime PRN  aluminum hydroxide/magnesium hydroxide/simethicone Suspension 30 milliLiter(s) Oral every 4 hours PRN  cefTRIAXone Injectable. 1000 milliGRAM(s) IV Push every 24 hours  HYDROmorphone  Injectable 0.5 milliGRAM(s) IV Push every 4 hours PRN  melatonin 3 milliGRAM(s) Oral at bedtime PRN  multivitamin 1 Tablet(s) Oral daily  ondansetron Injectable 4 milliGRAM(s) IV Push every 8 hours PRN  oxybutynin 5 milliGRAM(s) Oral three times a day PRN  oxyCODONE    IR 5 milliGRAM(s) Oral every 4 hours PRN  phenazopyridine 200 milliGRAM(s) Oral three times a day PRN  tamsulosin 0.8 milliGRAM(s) Oral at bedtime

## 2025-06-07 NOTE — DISCHARGE NOTE PROVIDER - PROVIDER TOKENS
PROVIDER:[TOKEN:[[id number]],FOLLOWUP:[1 week]],PROVIDER:[TOKEN:[[id number]],FOLLOWUP:[1 week]]

## 2025-06-08 VITALS
OXYGEN SATURATION: 100 % | SYSTOLIC BLOOD PRESSURE: 136 MMHG | HEART RATE: 107 BPM | DIASTOLIC BLOOD PRESSURE: 93 MMHG | RESPIRATION RATE: 18 BRPM | TEMPERATURE: 98 F

## 2025-06-08 LAB
ANION GAP SERPL CALC-SCNC: 6 MMOL/L — SIGNIFICANT CHANGE UP (ref 5–17)
BUN SERPL-MCNC: 16 MG/DL — SIGNIFICANT CHANGE UP (ref 7–23)
CALCIUM SERPL-MCNC: 8.7 MG/DL — SIGNIFICANT CHANGE UP (ref 8.5–10.1)
CHLORIDE SERPL-SCNC: 106 MMOL/L — SIGNIFICANT CHANGE UP (ref 96–108)
CO2 SERPL-SCNC: 25 MMOL/L — SIGNIFICANT CHANGE UP (ref 22–31)
CREAT SERPL-MCNC: 0.84 MG/DL — SIGNIFICANT CHANGE UP (ref 0.5–1.3)
EGFR: 118 ML/MIN/1.73M2 — SIGNIFICANT CHANGE UP
EGFR: 118 ML/MIN/1.73M2 — SIGNIFICANT CHANGE UP
GLUCOSE SERPL-MCNC: 112 MG/DL — HIGH (ref 70–99)
HCT VFR BLD CALC: 27.1 % — LOW (ref 39–50)
HGB BLD-MCNC: 8.2 G/DL — LOW (ref 13–17)
MAGNESIUM SERPL-MCNC: 2 MG/DL — SIGNIFICANT CHANGE UP (ref 1.6–2.6)
MCHC RBC-ENTMCNC: 24.3 PG — LOW (ref 27–34)
MCHC RBC-ENTMCNC: 30.3 G/DL — LOW (ref 32–36)
MCV RBC AUTO: 80.2 FL — SIGNIFICANT CHANGE UP (ref 80–100)
NRBC # BLD AUTO: 0 K/UL — SIGNIFICANT CHANGE UP (ref 0–0)
NRBC # FLD: 0 K/UL — SIGNIFICANT CHANGE UP (ref 0–0)
NRBC BLD AUTO-RTO: 0 /100 WBCS — SIGNIFICANT CHANGE UP (ref 0–0)
PHOSPHATE SERPL-MCNC: 4.8 MG/DL — HIGH (ref 2.5–4.5)
PLATELET # BLD AUTO: 436 K/UL — HIGH (ref 150–400)
PMV BLD: 9.5 FL — SIGNIFICANT CHANGE UP (ref 7–13)
POTASSIUM SERPL-MCNC: 3.9 MMOL/L — SIGNIFICANT CHANGE UP (ref 3.5–5.3)
POTASSIUM SERPL-SCNC: 3.9 MMOL/L — SIGNIFICANT CHANGE UP (ref 3.5–5.3)
RBC # BLD: 3.38 M/UL — LOW (ref 4.2–5.8)
RBC # FLD: 13.2 % — SIGNIFICANT CHANGE UP (ref 10.3–14.5)
SODIUM SERPL-SCNC: 137 MMOL/L — SIGNIFICANT CHANGE UP (ref 135–145)
WBC # BLD: 7.84 K/UL — SIGNIFICANT CHANGE UP (ref 3.8–10.5)
WBC # FLD AUTO: 7.84 K/UL — SIGNIFICANT CHANGE UP (ref 3.8–10.5)

## 2025-06-08 PROCEDURE — 99239 HOSP IP/OBS DSCHRG MGMT >30: CPT

## 2025-06-08 RX ORDER — ONDANSETRON HCL/PF 4 MG/2 ML
1 VIAL (ML) INJECTION
Qty: 3 | Refills: 0
Start: 2025-06-08 | End: 2025-06-14

## 2025-06-08 RX ORDER — FERROUS FUMARATE 324(106)MG
1 TABLET ORAL
Qty: 30 | Refills: 0
Start: 2025-06-08 | End: 2025-07-07

## 2025-06-08 RX ORDER — ACETAMINOPHEN 500 MG/5ML
2 LIQUID (ML) ORAL
Qty: 18 | Refills: 0
Start: 2025-06-08 | End: 2025-06-10

## 2025-06-08 RX ADMIN — Medication 85 MILLILITER(S): at 12:07

## 2025-06-08 RX ADMIN — Medication 4 MILLIGRAM(S): at 06:42

## 2025-06-08 RX ADMIN — KETOROLAC TROMETHAMINE 15 MILLIGRAM(S): 30 INJECTION, SOLUTION INTRAMUSCULAR; INTRAVENOUS at 00:30

## 2025-06-08 RX ADMIN — Medication 15 MILLIGRAM(S): at 08:47

## 2025-06-08 RX ADMIN — Medication 30 MILLIGRAM(S): at 06:42

## 2025-06-08 RX ADMIN — Medication 15 MILLIGRAM(S): at 03:04

## 2025-06-08 RX ADMIN — Medication 1 TABLET(S): at 09:24

## 2025-06-08 RX ADMIN — CYANOCOBALAMIN 500 MICROGRAM(S): 1000 INJECTION INTRAMUSCULAR; SUBCUTANEOUS at 09:24

## 2025-06-08 RX ADMIN — KETOROLAC TROMETHAMINE 15 MILLIGRAM(S): 30 INJECTION, SOLUTION INTRAMUSCULAR; INTRAVENOUS at 12:00

## 2025-06-08 RX ADMIN — Medication 30 MILLIGRAM(S): at 13:19

## 2025-06-08 RX ADMIN — OXYBUTYNIN CHLORIDE 5 MILLIGRAM(S): 5 TABLET, FILM COATED, EXTENDED RELEASE ORAL at 08:47

## 2025-06-08 RX ADMIN — FOLIC ACID 1 MILLIGRAM(S): 1 TABLET ORAL at 09:24

## 2025-06-08 NOTE — DISCHARGE NOTE NURSING/CASE MANAGEMENT/SOCIAL WORK - FINANCIAL ASSISTANCE
[facility] provides services at a reduced cost to those who are determined to be eligible through [facility]’s financial assistance program. Information regarding [facility]’s financial assistance program can be found by going to [email or web address] or by calling [phone].

## 2025-06-08 NOTE — DISCHARGE NOTE NURSING/CASE MANAGEMENT/SOCIAL WORK - PATIENT PORTAL LINK FT
You can access the FollowMyHealth Patient Portal offered by [facility] by registering at the following website: [email or web address]. By joining [email or web address] [email or web address] portal, you will also be able to view your health information using other applications (apps) compatible with our system.

## 2025-06-09 ENCOUNTER — NON-APPOINTMENT (OUTPATIENT)
Age: 35
End: 2025-06-09

## 2025-06-09 LAB — SURGICAL PATHOLOGY STUDY: SIGNIFICANT CHANGE UP

## 2025-06-10 ENCOUNTER — APPOINTMENT (OUTPATIENT)
Dept: UROLOGY | Facility: CLINIC | Age: 35
End: 2025-06-10

## 2025-06-10 ENCOUNTER — APPOINTMENT (OUTPATIENT)
Dept: UROLOGY | Facility: CLINIC | Age: 35
End: 2025-06-10
Payer: COMMERCIAL

## 2025-06-10 VITALS
SYSTOLIC BLOOD PRESSURE: 121 MMHG | OXYGEN SATURATION: 98 % | WEIGHT: 200 LBS | RESPIRATION RATE: 16 BRPM | DIASTOLIC BLOOD PRESSURE: 76 MMHG | HEART RATE: 108 BPM | HEIGHT: 69 IN | BODY MASS INDEX: 29.62 KG/M2

## 2025-06-10 PROBLEM — Z96.0 URETERAL STENT PRESENT: Status: ACTIVE | Noted: 2025-06-10

## 2025-06-10 LAB
CULTURE RESULTS: SIGNIFICANT CHANGE UP
CULTURE RESULTS: SIGNIFICANT CHANGE UP
SCHISTOSOMA IGG SER-ACNC: <1 — SIGNIFICANT CHANGE UP
SPECIMEN SOURCE: SIGNIFICANT CHANGE UP
SPECIMEN SOURCE: SIGNIFICANT CHANGE UP

## 2025-06-10 PROCEDURE — 52310 CYSTOSCOPY AND TREATMENT: CPT

## 2025-06-13 DIAGNOSIS — D64.9 ANEMIA, UNSPECIFIED: ICD-10-CM

## 2025-06-13 DIAGNOSIS — E83.39 OTHER DISORDERS OF PHOSPHORUS METABOLISM: ICD-10-CM

## 2025-06-13 DIAGNOSIS — K21.00 GASTRO-ESOPHAGEAL REFLUX DISEASE WITH ESOPHAGITIS, WITHOUT BLEEDING: ICD-10-CM

## 2025-06-13 DIAGNOSIS — F12.99 CANNABIS USE, UNSPECIFIED WITH UNSPECIFIED CANNABIS-INDUCED DISORDER: ICD-10-CM

## 2025-06-13 DIAGNOSIS — N13.6 PYONEPHROSIS: ICD-10-CM

## 2025-06-13 DIAGNOSIS — E87.6 HYPOKALEMIA: ICD-10-CM

## 2025-06-13 DIAGNOSIS — F17.210 NICOTINE DEPENDENCE, CIGARETTES, UNCOMPLICATED: ICD-10-CM

## 2025-06-13 DIAGNOSIS — G47.00 INSOMNIA, UNSPECIFIED: ICD-10-CM

## 2025-06-14 DIAGNOSIS — Z88.8 ALLERGY STATUS TO OTHER DRUGS, MEDICAMENTS AND BIOLOGICAL SUBSTANCES: ICD-10-CM

## 2025-06-14 DIAGNOSIS — G47.00 INSOMNIA, UNSPECIFIED: ICD-10-CM

## 2025-06-14 DIAGNOSIS — K21.9 GASTRO-ESOPHAGEAL REFLUX DISEASE WITHOUT ESOPHAGITIS: ICD-10-CM

## 2025-06-14 DIAGNOSIS — F41.9 ANXIETY DISORDER, UNSPECIFIED: ICD-10-CM

## 2025-06-14 DIAGNOSIS — F90.9 ATTENTION-DEFICIT HYPERACTIVITY DISORDER, UNSPECIFIED TYPE: ICD-10-CM

## 2025-06-14 DIAGNOSIS — Z91.018 ALLERGY TO OTHER FOODS: ICD-10-CM

## 2025-06-14 DIAGNOSIS — E53.8 DEFICIENCY OF OTHER SPECIFIED B GROUP VITAMINS: ICD-10-CM

## 2025-06-14 DIAGNOSIS — E55.9 VITAMIN D DEFICIENCY, UNSPECIFIED: ICD-10-CM

## 2025-06-14 DIAGNOSIS — A41.9 SEPSIS, UNSPECIFIED ORGANISM: ICD-10-CM

## 2025-06-14 DIAGNOSIS — N13.6 PYONEPHROSIS: ICD-10-CM

## 2025-06-14 DIAGNOSIS — B65.0: ICD-10-CM

## 2025-06-14 DIAGNOSIS — T83.592A INFECTION AND INFLAMMATORY REACTION DUE TO INDWELLING URETERAL STENT, INITIAL ENCOUNTER: ICD-10-CM

## 2025-06-14 DIAGNOSIS — D50.9 IRON DEFICIENCY ANEMIA, UNSPECIFIED: ICD-10-CM

## 2025-06-14 DIAGNOSIS — N17.9 ACUTE KIDNEY FAILURE, UNSPECIFIED: ICD-10-CM

## 2025-06-14 DIAGNOSIS — Z90.49 ACQUIRED ABSENCE OF OTHER SPECIFIED PARTS OF DIGESTIVE TRACT: ICD-10-CM

## 2025-06-20 LAB
CELL MATERIAL STONE EST-MCNT: SIGNIFICANT CHANGE UP
LABORATORY COMMENT REPORT: SIGNIFICANT CHANGE UP
NIDUS STONE QN: SIGNIFICANT CHANGE UP

## 2025-07-11 ENCOUNTER — EMERGENCY (EMERGENCY)
Facility: HOSPITAL | Age: 35
LOS: 0 days | Discharge: HOME | End: 2025-07-11
Attending: EMERGENCY MEDICINE
Payer: COMMERCIAL

## 2025-07-11 VITALS
TEMPERATURE: 99 F | OXYGEN SATURATION: 100 % | HEART RATE: 96 BPM | DIASTOLIC BLOOD PRESSURE: 86 MMHG | RESPIRATION RATE: 18 BRPM | SYSTOLIC BLOOD PRESSURE: 129 MMHG

## 2025-07-11 VITALS — HEIGHT: 68.9 IN

## 2025-07-11 DIAGNOSIS — Z98.890 OTHER SPECIFIED POSTPROCEDURAL STATES: Chronic | ICD-10-CM

## 2025-07-11 DIAGNOSIS — R10.31 RIGHT LOWER QUADRANT PAIN: ICD-10-CM

## 2025-07-11 DIAGNOSIS — N23 UNSPECIFIED RENAL COLIC: ICD-10-CM

## 2025-07-11 DIAGNOSIS — Z87.442 PERSONAL HISTORY OF URINARY CALCULI: ICD-10-CM

## 2025-07-11 DIAGNOSIS — Z96.0 PRESENCE OF UROGENITAL IMPLANTS: ICD-10-CM

## 2025-07-11 DIAGNOSIS — R35.0 FREQUENCY OF MICTURITION: ICD-10-CM

## 2025-07-11 DIAGNOSIS — R11.2 NAUSEA WITH VOMITING, UNSPECIFIED: ICD-10-CM

## 2025-07-11 DIAGNOSIS — R31.0 GROSS HEMATURIA: ICD-10-CM

## 2025-07-11 DIAGNOSIS — D64.9 ANEMIA, UNSPECIFIED: ICD-10-CM

## 2025-07-11 DIAGNOSIS — Z91.018 ALLERGY TO OTHER FOODS: ICD-10-CM

## 2025-07-11 DIAGNOSIS — Z90.49 ACQUIRED ABSENCE OF OTHER SPECIFIED PARTS OF DIGESTIVE TRACT: Chronic | ICD-10-CM

## 2025-07-11 DIAGNOSIS — Z90.49 ACQUIRED ABSENCE OF OTHER SPECIFIED PARTS OF DIGESTIVE TRACT: ICD-10-CM

## 2025-07-11 LAB
ALBUMIN SERPL ELPH-MCNC: 3.6 G/DL — SIGNIFICANT CHANGE UP (ref 3.3–5)
ALP SERPL-CCNC: 119 U/L — SIGNIFICANT CHANGE UP (ref 40–120)
ALT FLD-CCNC: 45 U/L — SIGNIFICANT CHANGE UP (ref 12–78)
ANION GAP SERPL CALC-SCNC: 4 MMOL/L — LOW (ref 5–17)
APPEARANCE UR: ABNORMAL
APTT BLD: 29.7 SEC — SIGNIFICANT CHANGE UP (ref 26.1–36.8)
AST SERPL-CCNC: 20 U/L — SIGNIFICANT CHANGE UP (ref 15–37)
BACTERIA # UR AUTO: NEGATIVE /HPF — SIGNIFICANT CHANGE UP
BASOPHILS # BLD AUTO: 0.01 K/UL — SIGNIFICANT CHANGE UP (ref 0–0.2)
BASOPHILS NFR BLD AUTO: 0.1 % — SIGNIFICANT CHANGE UP (ref 0–2)
BILIRUB SERPL-MCNC: 0.2 MG/DL — SIGNIFICANT CHANGE UP (ref 0.2–1.2)
BILIRUB UR-MCNC: NEGATIVE — SIGNIFICANT CHANGE UP
BUN SERPL-MCNC: 14 MG/DL — SIGNIFICANT CHANGE UP (ref 7–23)
CALCIUM SERPL-MCNC: 9 MG/DL — SIGNIFICANT CHANGE UP (ref 8.5–10.1)
CAST: SIGNIFICANT CHANGE UP /LPF
CHLORIDE SERPL-SCNC: 111 MMOL/L — HIGH (ref 96–108)
CO2 SERPL-SCNC: 25 MMOL/L — SIGNIFICANT CHANGE UP (ref 22–31)
COLOR SPEC: ABNORMAL
COMMENT - URINE: SIGNIFICANT CHANGE UP
CREAT SERPL-MCNC: 1.19 MG/DL — SIGNIFICANT CHANGE UP (ref 0.5–1.3)
DIFF PNL FLD: ABNORMAL
EGFR: 83 ML/MIN/1.73M2 — SIGNIFICANT CHANGE UP
EGFR: 83 ML/MIN/1.73M2 — SIGNIFICANT CHANGE UP
EOSINOPHIL # BLD AUTO: 0.03 K/UL — SIGNIFICANT CHANGE UP (ref 0–0.5)
EOSINOPHIL NFR BLD AUTO: 0.4 % — SIGNIFICANT CHANGE UP (ref 0–6)
EPI CELLS # UR: PRESENT
GLUCOSE SERPL-MCNC: 110 MG/DL — HIGH (ref 70–99)
GLUCOSE UR QL: 100 MG/DL
HCT VFR BLD CALC: 31.4 % — LOW (ref 39–50)
HGB BLD-MCNC: 9.5 G/DL — LOW (ref 13–17)
IMM GRANULOCYTES # BLD AUTO: 0.02 K/UL — SIGNIFICANT CHANGE UP (ref 0–0.07)
IMM GRANULOCYTES NFR BLD AUTO: 0.3 % — SIGNIFICANT CHANGE UP (ref 0–0.9)
INR BLD: 1.1 RATIO — SIGNIFICANT CHANGE UP (ref 0.85–1.16)
KETONES UR QL: ABNORMAL MG/DL
LEUKOCYTE ESTERASE UR-ACNC: ABNORMAL
LIDOCAIN IGE QN: 25 U/L — SIGNIFICANT CHANGE UP (ref 13–75)
LYMPHOCYTES # BLD AUTO: 1.65 K/UL — SIGNIFICANT CHANGE UP (ref 1–3.3)
LYMPHOCYTES NFR BLD AUTO: 23.7 % — SIGNIFICANT CHANGE UP (ref 13–44)
MANUAL SMEAR VERIFICATION: SIGNIFICANT CHANGE UP
MCHC RBC-ENTMCNC: 22.4 PG — LOW (ref 27–34)
MCHC RBC-ENTMCNC: 30.3 G/DL — LOW (ref 32–36)
MCV RBC AUTO: 74.1 FL — LOW (ref 80–100)
MICROCYTES BLD QL: ABNORMAL
MONOCYTES # BLD AUTO: 0.67 K/UL — SIGNIFICANT CHANGE UP (ref 0–0.9)
MONOCYTES NFR BLD AUTO: 9.6 % — SIGNIFICANT CHANGE UP (ref 2–14)
NEUTROPHILS # BLD AUTO: 4.58 K/UL — SIGNIFICANT CHANGE UP (ref 1.8–7.4)
NEUTROPHILS NFR BLD AUTO: 65.9 % — SIGNIFICANT CHANGE UP (ref 43–77)
NITRITE UR-MCNC: NEGATIVE — SIGNIFICANT CHANGE UP
NRBC # BLD AUTO: 0 K/UL — SIGNIFICANT CHANGE UP (ref 0–0)
NRBC # FLD: 0 K/UL — SIGNIFICANT CHANGE UP (ref 0–0)
NRBC BLD AUTO-RTO: 0 /100 WBCS — SIGNIFICANT CHANGE UP (ref 0–0)
OVALOCYTES BLD QL SMEAR: SLIGHT — SIGNIFICANT CHANGE UP
PH UR: 6 — SIGNIFICANT CHANGE UP (ref 5–8)
PLAT MORPH BLD: NORMAL — SIGNIFICANT CHANGE UP
PLATELET # BLD AUTO: 457 K/UL — HIGH (ref 150–400)
PMV BLD: 9.7 FL — SIGNIFICANT CHANGE UP (ref 7–13)
POIKILOCYTOSIS BLD QL AUTO: SLIGHT — SIGNIFICANT CHANGE UP
POTASSIUM SERPL-MCNC: 4.1 MMOL/L — SIGNIFICANT CHANGE UP (ref 3.5–5.3)
POTASSIUM SERPL-SCNC: 4.1 MMOL/L — SIGNIFICANT CHANGE UP (ref 3.5–5.3)
PROT SERPL-MCNC: 7.4 GM/DL — SIGNIFICANT CHANGE UP (ref 6–8.3)
PROT UR-MCNC: 300 MG/DL
PROTHROM AB SERPL-ACNC: 12.6 SEC — SIGNIFICANT CHANGE UP (ref 9.9–13.4)
RBC # BLD: 4.24 M/UL — SIGNIFICANT CHANGE UP (ref 4.2–5.8)
RBC # FLD: 14.6 % — HIGH (ref 10.3–14.5)
RBC BLD AUTO: ABNORMAL
RBC CASTS # UR COMP ASSIST: >1900 /HPF — HIGH (ref 0–4)
SODIUM SERPL-SCNC: 140 MMOL/L — SIGNIFICANT CHANGE UP (ref 135–145)
SP GR SPEC: >1.03 — HIGH (ref 1–1.03)
SQUAMOUS # UR AUTO: 3 /HPF — SIGNIFICANT CHANGE UP (ref 0–5)
UROBILINOGEN FLD QL: 1 MG/DL — SIGNIFICANT CHANGE UP (ref 0.2–1)
WBC # BLD: 6.96 K/UL — SIGNIFICANT CHANGE UP (ref 3.8–10.5)
WBC # FLD AUTO: 6.96 K/UL — SIGNIFICANT CHANGE UP (ref 3.8–10.5)
WBC UR QL: 63 /HPF — HIGH (ref 0–5)

## 2025-07-11 PROCEDURE — 96374 THER/PROPH/DIAG INJ IV PUSH: CPT

## 2025-07-11 PROCEDURE — 99285 EMERGENCY DEPT VISIT HI MDM: CPT

## 2025-07-11 PROCEDURE — 85025 COMPLETE CBC W/AUTO DIFF WBC: CPT

## 2025-07-11 PROCEDURE — 85730 THROMBOPLASTIN TIME PARTIAL: CPT

## 2025-07-11 PROCEDURE — 81001 URINALYSIS AUTO W/SCOPE: CPT

## 2025-07-11 PROCEDURE — 96375 TX/PRO/DX INJ NEW DRUG ADDON: CPT

## 2025-07-11 PROCEDURE — 83690 ASSAY OF LIPASE: CPT

## 2025-07-11 PROCEDURE — 99284 EMERGENCY DEPT VISIT MOD MDM: CPT | Mod: 25

## 2025-07-11 PROCEDURE — 85610 PROTHROMBIN TIME: CPT

## 2025-07-11 PROCEDURE — 80053 COMPREHEN METABOLIC PANEL: CPT

## 2025-07-11 PROCEDURE — 36415 COLL VENOUS BLD VENIPUNCTURE: CPT

## 2025-07-11 PROCEDURE — 74176 CT ABD & PELVIS W/O CONTRAST: CPT

## 2025-07-11 PROCEDURE — 74176 CT ABD & PELVIS W/O CONTRAST: CPT | Mod: 26

## 2025-07-11 PROCEDURE — 87086 URINE CULTURE/COLONY COUNT: CPT

## 2025-07-11 RX ORDER — ONDANSETRON HCL/PF 4 MG/2 ML
4 VIAL (ML) INJECTION ONCE
Refills: 0 | Status: COMPLETED | OUTPATIENT
Start: 2025-07-11 | End: 2025-07-11

## 2025-07-11 RX ORDER — KETOROLAC TROMETHAMINE 30 MG/ML
30 INJECTION, SOLUTION INTRAMUSCULAR; INTRAVENOUS ONCE
Refills: 0 | Status: DISCONTINUED | OUTPATIENT
Start: 2025-07-11 | End: 2025-07-11

## 2025-07-11 RX ORDER — KETOROLAC TROMETHAMINE 30 MG/ML
1 INJECTION, SOLUTION INTRAMUSCULAR; INTRAVENOUS
Qty: 16 | Refills: 0
Start: 2025-07-11 | End: 2025-07-14

## 2025-07-11 RX ORDER — OXYCODONE HYDROCHLORIDE 30 MG/1
1 TABLET ORAL
Qty: 16 | Refills: 0
Start: 2025-07-11 | End: 2025-07-14

## 2025-07-11 RX ADMIN — KETOROLAC TROMETHAMINE 30 MILLIGRAM(S): 30 INJECTION, SOLUTION INTRAMUSCULAR; INTRAVENOUS at 18:27

## 2025-07-11 RX ADMIN — Medication 1000 MILLILITER(S): at 17:13

## 2025-07-11 RX ADMIN — Medication 4 MILLIGRAM(S): at 17:13

## 2025-07-11 NOTE — ED STATDOCS - PROGRESS NOTE DETAILS
33 y/o m with PMHof kidney stones presents with right sided flank pain, hematuria at home. States he had recent 1cm stone which required stent placement. Had stent removed a few weeks ago, but states over past 2 days pain has been worsening. Denies fever, chills. +nausea/vomiting. Urology: Dr. Pedersen PE: uncomfortable appearing. Cardiac: s1s2, RRR. Lungs: CTAB. Abdomen: NBS x4 soft, +right sided CVAT and RLQ tenderness. A/P: concern for kidney stone, plan for labs, CT, d/w urology. - Murali Keating PA-C Labs and imaging reviewed with patient. Concern for passed stone. Unlikely UTI based on UA. pt reports mild improvement in pain. Noted anemia, consistent with prior (9.5 today, prior 8.2, 9.1). BUN/creatinine WNL. Advised close follow up with Dr. Pedersen. Will send Rx for toradol. Offered oxycodone, patient states upon last admission he was dc'd with morphine which helped better. Will verify in chart, prescribe similar dose as previous. - Murali Keating PA-C

## 2025-07-11 NOTE — ED STATDOCS - PHYSICAL EXAMINATION
Physical Exam:  Gen: NAD, non-toxic appearing, able to ambulate without assistance, appears uncomfortable  Head: NCAT  HEENT: EOMI, PEERLA, normal conjunctiva, tongue midline, oral mucosa moist  Lung: CTAB, no respiratory distress, no wheezes/rhonchi/rales B/L, speaking in full sentences  CV: RRR, no murmurs, rubs or gallops, distal pulses 2+ b/l  Abd: soft, no distention, no guarding, no rigidity, no rebound tenderness, RLQ tenderness to palpation  MSK: no visible deformities, ROM normal in UE/LE  Skin: Warm, well perfused, no rash  Psych: normal affect, calm

## 2025-07-11 NOTE — ED PROVIDER NOTE - PROGRESS NOTE DETAILS
35 y/o m with PMHof kidney stones presents with right sided flank pain, hematuria at home. States he had recent 1cm stone which required stent placement. Had stent removed a few weeks ago, but states over past 2 days pain has been worsening. Denies fever, chills. +nausea/vomiting. Urology: Dr. Pedersen PE: uncomfortable appearing. Cardiac: s1s2, RRR. Lungs: CTAB. Abdomen: NBS x4 soft, +right sided CVAT and RLQ tenderness. A/P: concern for kidney stone, plan for labs, CT, d/w urology. - Murali Keating PA-C

## 2025-07-11 NOTE — ED STATDOCS - OBJECTIVE STATEMENT
33 y/o male with a PMHx of cholecystectomy in 2021 and kidney stones s/p stent in June presents to the ED c/o RLQ pain onset Tuesday. Pt endorses nausea, vomiting, hematuria and urinary frequency. Pt reports that he was here at [facility] a month ago kidney stoney in ureter with stents placed. Pt had stent removed mid June. Pt reports taking tylenol and advil for pain. Note pt allergy to Prozac.

## 2025-07-11 NOTE — ED STATDOCS - NSFOLLOWUPINSTRUCTIONS_ED_ALL_ED_FT
Pt informed of results   Renal Colic  The urinary tract with a close-up of a kidney with kidney stones.  Renal colic is pain that is caused by passing a kidney stone. The pain can be sharp and severe. It may be felt in your back, abdomen, side (flank), or groin. It can cause nausea. Renal colic can come and go.    Follow these instructions at home:  Watch your condition for any changes.    Medicines    Take over-the-counter and prescription medicines only as told by your health care provider.  Ask your provider if the medicine prescribed to you:  Requires you to avoid driving or using machinery.  Can cause constipation. You may need to take these actions to prevent or treat constipation:  Take over-the-counter or prescription medicines.  Eat foods that are high in fiber, such as beans, whole grains, and fresh fruits and vegetables.  Limit foods that are high in fat and processed sugars, such as fried or sweet foods.  Eating and drinking    Drink enough fluid to keep your pee (urine) pale yellow. You may be told to drink at least 8–10 glasses of water each day.  Follow instructions from your provider about what you may eat and drink.  If told, change your diet. You may need to:  Limit how much salt (sodium) you eat. You may need to eat less than 2 grams (2,000 mg) per day.  Eat more fruits and vegetables.  Limit how much animal protein you eat. This includes red meat, fish, poultry, and eggs.  Avoid foods such as spinach, rhubarb, sweet potatoes, and nuts. These foods make kidney stones more likely to form.  General instructions    Collect pee samples as told by your provider. You may need to collect a pee sample after you pass the kidney stone.  Strain your pee every time you pee (urinate), for as long as you are told. Use the strainer that your provider gives you.  Do not throw out the kidney stone after you pass it. Keep the stone so it can be tested by your provider. Testing the makeup of your kidney stone may show why you got it and help prevent you from getting more in the future.  Your provider may give you more instructions. Make sure you know what you can and cannot do.    Contact a health care provider if:  You have a fever or chills.  Your pee smells bad or looks cloudy.  You have pain or burning when you pee.  You have blood in your pee.  Get help right away if:  The pain in your flank or groin suddenly gets worse.  You become confused or do not know the time of day, where you are, or who you are (become disoriented).  You feel like you may faint or you faint.  This information is not intended to replace advice given to you by your health care provider. Make sure you discuss any questions you have with your health care provider.

## 2025-07-11 NOTE — ED STATDOCS - NSICDXPASTMEDICALHX_GEN_ALL_CORE_FT
Xray Forearm, Left Xray Tibia + Fibula 2 Views, Left PAST MEDICAL HISTORY:  ADHD     Adult acne     Anxiety     Attention deficit hyperkinetic disorder     COVID-19 vaccine series completed Pfizer. 2nd dose "May or June, 2021"    Esophagitis     GERD (gastroesophageal reflux disease)     H/O esophageal ulcer     Hematuria microscopic    Insomnia     Stomach ulcer

## 2025-07-11 NOTE — ED ADULT TRIAGE NOTE - CHIEF COMPLAINT QUOTE
Pt c/o RLQ pain, N/V, and hematuria since Tuesday. HX of kidney stones.
To get better and follow your care plan as instructed.

## 2025-07-11 NOTE — ED PROVIDER NOTE - PRO INTERPRETER NEED 2
Medication: Flecainide  Last office visit date: 9/16/2024  Medication Refill Protocol Failed.  Failed criteria:  . Sent to clinician to review.       Flecainide Refill Protocol - 12 Month Protocol Odhajl48/01/2024 02:45 PM   Protocol Details Medication (including dose and sig) on current meds list    Stress Test resulted in the last 3 years -- Refer to Chart Review for test details           English

## 2025-07-11 NOTE — ED STATDOCS - CARE PROVIDER_API CALL
Cornell Pedersen  Urology  [address] [place] [id number]  Phone: [phone]  Fax: [phone]  Follow Up Time:

## 2025-07-11 NOTE — ED STATDOCS - PATIENT PORTAL LINK FT
You can access the FollowMyHealth Patient Portal offered by [facility] by registering at the following website: [email or web address]. By joining [employer]’s FollowMyHealth portal, you will also be able to view your health information using other applications (apps) compatible with our system.

## 2025-07-11 NOTE — ED STATDOCS - ATTENDING APP SHARED VISIT CONTRIBUTION OF CARE
I,Bryan Galvan MD,  performed the initial face to face bedside interview with this patient regarding history of present illness, review of symptoms and relevant past medical, social and family history.  I completed an independent physical examination.  I was the initial provider who evaluated this patient. I have signed out the follow up of any pending tests (i.e. labs, radiological studies) to the ACP.  I have communicated the patient’s plan of care and disposition with the ACP.  The history, relevant review of systems, past medical and surgical history, medical decision making, and physical examination was documented by the scribe in my presence and I attest to the accuracy of the documentation.

## 2025-07-11 NOTE — ED ADULT NURSE NOTE - NSSEPSISSUSPECTED_ED_A_ED
No Cheilitis Aggressive Treatment: I recommended application of Vaseline or Aquaphor numerous times a day (as often as every hour) and before going to bed. I also prescribed a topical steroid for twice daily use.

## 2025-07-11 NOTE — ED ADULT NURSE NOTE - OBJECTIVE STATEMENT
presenting to the ed C/O abdominal pain stating, "feels similar to previous kidney stones." endorsing nausea with vomiting

## 2025-07-11 NOTE — ED ADULT NURSE NOTE - IN THE PAST 12 MONTHS HAVE YOU USED DRUGS OTHER THAN THOSE REQUIRED FOR MEDICAL REASON?
Patient dropped off [facility] paperwork to be completed, patient signed a medical records disclosure so that the completed forms could be faxed to [phone]. Forms were placed in Dr. Oglesby mailbox.   Thank you.    No

## 2025-07-12 LAB
CULTURE RESULTS: SIGNIFICANT CHANGE UP
SPECIMEN SOURCE: SIGNIFICANT CHANGE UP

## 2025-08-08 ENCOUNTER — APPOINTMENT (OUTPATIENT)
Dept: UROLOGY | Facility: CLINIC | Age: 35
End: 2025-08-08

## 2025-09-09 ENCOUNTER — APPOINTMENT (OUTPATIENT)
Dept: UROLOGY | Facility: CLINIC | Age: 35
End: 2025-09-09
Payer: COMMERCIAL

## 2025-09-09 DIAGNOSIS — R35.0 FREQUENCY OF MICTURITION: ICD-10-CM

## 2025-09-09 DIAGNOSIS — N30.10 INTERSTITIAL CYSTITIS (CHRONIC) W/OUT HEMATURIA: ICD-10-CM

## 2025-09-09 DIAGNOSIS — E83.50 UNSPECIFIED DISORDER OF CALCIUM METABOLISM: ICD-10-CM

## 2025-09-09 PROCEDURE — 51798 US URINE CAPACITY MEASURE: CPT

## 2025-09-09 PROCEDURE — 99214 OFFICE O/P EST MOD 30 MIN: CPT | Mod: 25

## 2025-09-09 RX ORDER — ALFUZOSIN HYDROCHLORIDE 10 MG/1
10 TABLET, EXTENDED RELEASE ORAL DAILY
Qty: 90 | Refills: 1 | Status: ACTIVE | COMMUNITY
Start: 2025-09-09 | End: 1900-01-01

## 2025-09-09 RX ORDER — OXYCODONE 5 MG/1
5 TABLET ORAL
Qty: 10 | Refills: 0 | Status: ACTIVE | COMMUNITY
Start: 2025-09-09 | End: 1900-01-01

## 2025-09-10 ENCOUNTER — NON-APPOINTMENT (OUTPATIENT)
Age: 35
End: 2025-09-10

## 2025-09-10 LAB
APPEARANCE: CLEAR
BACTERIA: NEGATIVE /HPF
BILIRUBIN URINE: NEGATIVE
BLOOD URINE: ABNORMAL
CAST: 0 /LPF
COLOR: YELLOW
EPITHELIAL CELLS: 0 /HPF
GLUCOSE QUALITATIVE U: NEGATIVE MG/DL
KETONES URINE: NEGATIVE MG/DL
LEUKOCYTE ESTERASE URINE: ABNORMAL
MICROSCOPIC-UA: NORMAL
NITRITE URINE: NEGATIVE
PH URINE: 7
PROTEIN URINE: NORMAL MG/DL
RED BLOOD CELLS URINE: 76 /HPF
SPECIFIC GRAVITY URINE: 1.02
UROBILINOGEN URINE: 0.2 MG/DL
WHITE BLOOD CELLS URINE: 9 /HPF

## 2025-09-11 LAB — BACTERIA UR CULT: NORMAL
